# Patient Record
Sex: MALE | Race: WHITE | NOT HISPANIC OR LATINO | Employment: FULL TIME | URBAN - METROPOLITAN AREA
[De-identification: names, ages, dates, MRNs, and addresses within clinical notes are randomized per-mention and may not be internally consistent; named-entity substitution may affect disease eponyms.]

---

## 2018-05-09 ENCOUNTER — HOSPITAL ENCOUNTER (OUTPATIENT)
Dept: RADIOLOGY | Facility: HOSPITAL | Age: 58
Discharge: HOME | End: 2018-05-09
Payer: COMMERCIAL

## 2018-05-09 ENCOUNTER — TRANSCRIBE ORDERS (OUTPATIENT)
Dept: ADMINISTRATIVE | Facility: HOSPITAL | Age: 58
End: 2018-05-09

## 2018-05-09 DIAGNOSIS — G89.29 CHRONIC LOW BACK PAIN WITHOUT SCIATICA, UNSPECIFIED BACK PAIN LATERALITY: ICD-10-CM

## 2018-05-09 DIAGNOSIS — G89.29 CHRONIC LOW BACK PAIN WITHOUT SCIATICA, UNSPECIFIED BACK PAIN LATERALITY: Primary | ICD-10-CM

## 2018-05-09 DIAGNOSIS — M54.50 CHRONIC LOW BACK PAIN WITHOUT SCIATICA, UNSPECIFIED BACK PAIN LATERALITY: ICD-10-CM

## 2018-05-09 DIAGNOSIS — M54.50 CHRONIC LOW BACK PAIN WITHOUT SCIATICA, UNSPECIFIED BACK PAIN LATERALITY: Primary | ICD-10-CM

## 2018-05-09 PROCEDURE — 72110 X-RAY EXAM L-2 SPINE 4/>VWS: CPT

## 2021-01-21 ENCOUNTER — EVALUATION (OUTPATIENT)
Dept: OCCUPATIONAL THERAPY | Facility: CLINIC | Age: 61
End: 2021-01-21
Payer: COMMERCIAL

## 2021-01-21 DIAGNOSIS — M65.322 TRIGGER FINGER, LEFT INDEX FINGER: Primary | ICD-10-CM

## 2021-01-21 PROCEDURE — 97165 OT EVAL LOW COMPLEX 30 MIN: CPT | Performed by: OCCUPATIONAL THERAPIST

## 2021-01-21 PROCEDURE — 97110 THERAPEUTIC EXERCISES: CPT | Performed by: OCCUPATIONAL THERAPIST

## 2021-01-21 NOTE — PROGRESS NOTES
OT Evaluation     Today's date: 2021  Patient name: Dana Quijano  : 1960  MRN: [id number]  Referring provider: Filiberto Arellano MD  Dx:   Encounter Diagnosis     ICD-10-CM    1  Trigger finger, left index finger  M65 322        Start Time: 1400  Stop Time: 1445  Total time in clinic (min): 45 minutes    Assessment  Assessment details: 63 y/o right handed male who presents with chronic mild to moderate edema of the left index finger post left II TFR  Pt  Has been unable to make a composite fist and has had to restrict left use to due to pain and edema  Pt  Is 5 weeks post DOS and presents with limited AROM, pain, edema, and weakness  Impairments: impaired physical strength, lacks appropriate home exercise program and pain with function  Other impairment: Pt  holds index in extension during function to avoid use due to pain   Functional limitations: UA to  with force, open jars, do resisted exercises, Pain with gripping and pulling/pushing  Symptom irritability: lowBarriers to therapy: none  Understanding of Dx/Px/POC: good  Goals  STG   1  Increase PIP flexion to more than 90 degrees  2  Reduce edema by at least  3 cm  3  Pain with flexion less than 3/10    LTG  1  Composite flexion to WNL  2  Resolve edema to a minimal level  3   of left to at least 50% of right  4  Return to golfing  Plan  Plan details: Pt  Will be attending therapy once weekly  He has been issued a HEP for edema control, TGE, night positioning, and pain control  HEP will be progressed as goals are achieved     Patient would benefit from: skilled occupational therapy and OT eval  Planned modality interventions: thermotherapy: hydrocollator packs  Planned therapy interventions: stretching, strengthening, therapeutic activities, therapeutic exercise, graded exercise, graded activity, functional ROM exercises, patient education, home exercise program, joint mobilization and manual therapy  Frequency: 1x week  Duration in visits: 4  Duration in weeks: 4  Plan of Care beginning date: 2021  Plan of Care expiration date: 2021  Treatment plan discussed with: patient        Subjective Evaluation    History of Present Illness  Date of surgery: 12/15/2020  Mechanism of injury: surgery  Mechanism of injury: Chronic trigger finger, L II;   TFR of L II  12/15/2020  Quality of life: good    Pain  Current pain ratin  At best pain rating: 3  At worst pain rating: 3  Location: Left index  Quality: pressure, throbbing and dull ache  Relieving factors: heat, ice, rest and change in position  Progression: no change    Hand dominance: right  Life stress: UA to golf    Treatments  Previous treatment: chiropractic  Current treatment: home therapy, injection treatment and occupational therapy  Patient Goals  Patient goals for therapy: decreased edema, decreased pain, increased motion, increased strength, return to sport/leisure activities and independence with ADLs/IADLs          Objective     Observations     Left Wrist/Hand   Positive for edema  Additional Observation Details  Mild chronic edema of the left index with reduced dorsal knuckle pads  Palpation     Additional Palpation Details  Tenderness surrounding surgical site  Mildly edematous  Active Range of Motion     Left Digits    Flexion   Index     MCP: 85    PIP: 72    DIP: 40  Extension   Index     MCP: 0    PIP: -20    DIP: 0    Additional Active Range of Motion Details  Composite flexion of II to DPC= 3 0 cm; Index PIP rests in slight flexion due to joint edema  Strength/Myotome Testing     Additional Strength Details  Strength NT due to painful flexion of left II; Will be recorded at a later date       Swelling     Left Wrist/Hand   Index     Middle: 7 1 cm    Right Wrist/Hand   Index     Middle: 6 6 cm             Precautions:  universal      Manuals                                                                 Neuro Re-Ed Ther Ex             HEP edema, TGE, pain                                                                                                       Ther Activity                                       Gait Training                                       Modalities

## 2021-01-21 NOTE — LETTER
2021    Aric Lagunas MD  [address]  [address] [address]    Patient: Walter Navarrete   YOB: 1960   Date of Visit: 2021     Encounter Diagnosis     ICD-10-CM    1  Trigger finger, left index finger  M65 322        Dear Dr Moody Reid Recipients: Thank you for your recent referral of Walter Navarrete  Please review the attached evaluation summary from Peter's recent visit  Please verify that you agree with the plan of care by signing the attached order  If you have any questions or concerns, please do not hesitate to call  I sincerely appreciate the opportunity to share in the care of one of your patients and hope to have another opportunity to work with you in the near future  Sincerely,    Nikunj Jorgensen, OT      Referring Provider:     I certify that I have read the below Plan of Care and certify the need for these services furnished under this plan of treatment while under my care  Aric Lagunas MD  [place] [address]  Via Fax: [phone]        OT Evaluation     Today's date: 2021  Patient name: Walter Navarrete  : 1960  MRN: [id number]  Referring provider: Jinny Oro MD  Dx:   Encounter Diagnosis     ICD-10-CM    1  Trigger finger, left index finger  M65 322        Start Time: 1400  Stop Time: 1445  Total time in clinic (min): 45 minutes    Assessment  Assessment details: 65 y/o right handed male who presents with chronic mild to moderate edema of the left index finger post left II TFR  Pt  Has been unable to make a composite fist and has had to restrict left use to due to pain and edema  Pt  Is 5 weeks post DOS and presents with limited AROM, pain, edema, and weakness  Impairments: impaired physical strength, lacks appropriate home exercise program and pain with function  Other impairment: Pt  holds index in extension during function to avoid use due to pain   Functional limitations: UA to  with force, open jars, do resisted exercises, Pain with gripping and pulling/pushing  Symptom irritability: lowBarriers to therapy: none  Understanding of Dx/Px/POC: good  Goals  STG   1  Increase PIP flexion to more than 90 degrees  2  Reduce edema by at least  3 cm  3  Pain with flexion less than 3/10    LTG  1  Composite flexion to WNL  2  Resolve edema to a minimal level  3   of left to at least 50% of right  4  Return to golfing  Plan  Plan details: Pt  Will be attending therapy once weekly  He has been issued a HEP for edema control, TGE, night positioning, and pain control  HEP will be progressed as goals are achieved     Patient would benefit from: skilled occupational therapy and OT eval  Planned modality interventions: thermotherapy: hydrocollator packs  Planned therapy interventions: stretching, strengthening, therapeutic activities, therapeutic exercise, graded exercise, graded activity, functional ROM exercises, patient education, home exercise program, joint mobilization and manual therapy  Frequency: 1x week  Duration in visits: 4  Duration in weeks: 4  Plan of Care beginning date: 2021  Plan of Care expiration date: 2021  Treatment plan discussed with: patient        Subjective Evaluation    History of Present Illness  Date of surgery: 12/15/2020  Mechanism of injury: surgery  Mechanism of injury: Chronic trigger finger, L II;   TFR of L II  12/15/2020  Quality of life: good    Pain  Current pain ratin  At best pain rating: 3  At worst pain rating: 3  Location: Left index  Quality: pressure, throbbing and dull ache  Relieving factors: heat, ice, rest and change in position  Progression: no change    Hand dominance: right  Life stress: UA to golf    Treatments  Previous treatment: chiropractic  Current treatment: home therapy, injection treatment and occupational therapy  Patient Goals  Patient goals for therapy: decreased edema, decreased pain, increased motion, increased strength, return to sport/leisure activities and independence with ADLs/IADLs          Objective     Observations     Left Wrist/Hand   Positive for edema  Additional Observation Details  Mild chronic edema of the left index with reduced dorsal knuckle pads  Palpation     Additional Palpation Details  Tenderness surrounding surgical site  Mildly edematous  Active Range of Motion     Left Digits    Flexion   Index     MCP: 85    PIP: 72    DIP: 40  Extension   Index     MCP: 0    PIP: -20    DIP: 0    Additional Active Range of Motion Details  Composite flexion of II to DPC= 3 0 cm; Index PIP rests in slight flexion due to joint edema  Strength/Myotome Testing     Additional Strength Details  Strength NT due to painful flexion of left II; Will be recorded at a later date       Swelling     Left Wrist/Hand   Index     Middle: 7 1 cm    Right Wrist/Hand   Index     Middle: 6 6 cm             Precautions:  universal      Manuals 1/21                                                                Neuro Re-Ed                                                                                                        Ther Ex             HEP edema, TGE, pain                                                                                                       Ther Activity                                       Gait Training                                       Modalities

## 2021-01-22 ENCOUNTER — TRANSCRIBE ORDERS (OUTPATIENT)
Dept: PHYSICAL THERAPY | Facility: CLINIC | Age: 61
End: 2021-01-22

## 2021-01-22 DIAGNOSIS — M65.322 TRIGGER FINGER, LEFT INDEX FINGER: Primary | ICD-10-CM

## 2021-01-28 ENCOUNTER — OFFICE VISIT (OUTPATIENT)
Dept: OCCUPATIONAL THERAPY | Facility: CLINIC | Age: 61
End: 2021-01-28
Payer: COMMERCIAL

## 2021-01-28 DIAGNOSIS — M65.322 TRIGGER FINGER, LEFT INDEX FINGER: Primary | ICD-10-CM

## 2021-01-28 PROCEDURE — 97140 MANUAL THERAPY 1/> REGIONS: CPT | Performed by: OCCUPATIONAL THERAPIST

## 2021-01-28 PROCEDURE — 97110 THERAPEUTIC EXERCISES: CPT | Performed by: OCCUPATIONAL THERAPIST

## 2021-01-28 NOTE — PROGRESS NOTES
Daily Note     Today's date: 2021  Patient name: Derik Zimmer  : 1960  MRN: [id number]  Referring provider: Debbi Ramos MD  Dx: No diagnosis found  Subjective: " I made the most progress in the last week"      Objective: See treatment diary below ;  Pt  Issued new HEP for hook fisting, scar care to volar MP scar tissue, and continue edema control  Hook fist to Volar MP  II  2 cm,  III, IV, V  1 cm    Assessment: Tolerated treatment well  Pain minimal with hook fisting, but increases to sharp pain with composite fisting  edema or digit reducing, localized volar scar edema persists  Patient would benefit from continued OT      Plan: Progress treatment as tolerated  Assess hook fisting and composite fisting pain level and edema reduction        Precautions:  universal      Manuals            Jt  mobs  5'           IASTM  5'                                     Neuro Re-Ed                                                                                                        Ther Ex    15'           HEP edema, TGE, pain New HEP= Hook,  Edema palm, pain                                                                                                       Ther Activity                                       Gait Training                                       Modalities   5' pre tx

## 2021-02-04 ENCOUNTER — OFFICE VISIT (OUTPATIENT)
Dept: OCCUPATIONAL THERAPY | Facility: CLINIC | Age: 61
End: 2021-02-04
Payer: COMMERCIAL

## 2021-02-04 DIAGNOSIS — M65.322 TRIGGER FINGER, LEFT INDEX FINGER: Primary | ICD-10-CM

## 2021-02-04 PROCEDURE — 97140 MANUAL THERAPY 1/> REGIONS: CPT | Performed by: OCCUPATIONAL THERAPIST

## 2021-02-04 PROCEDURE — 97110 THERAPEUTIC EXERCISES: CPT | Performed by: OCCUPATIONAL THERAPIST

## 2021-02-04 NOTE — PROGRESS NOTES
Daily Note     Today's date: 2021  Patient name: Chip Zapata  : 1960  MRN: [id number]  Referring provider: Leslie Wan MD  Dx: No diagnosis found  Subjective:  "My finger is still sore"  Sharp pain over scar with full composite flexion  Objective: See treatment diary below;  Edema glove with elastomer issued for HEP HOS  Assessment: Tolerated treatment well  Patient would benefit from continued OT      Plan: Progress treatment as tolerated         Precautions:  universal      Manuals           Jt  mobs  5' 5'          IASTM  5'           Edema RG   5'                       Neuro Re-Ed                                                                                                        Ther Ex    15'   20'          HEP edema, TGE, pain New HEP= Hook,  Edema palm, pain                                                                                                       Ther Activity                                       Gait Training                                       Modalities   5' pre tx

## 2021-02-11 ENCOUNTER — OFFICE VISIT (OUTPATIENT)
Dept: OCCUPATIONAL THERAPY | Facility: CLINIC | Age: 61
End: 2021-02-11
Payer: COMMERCIAL

## 2021-02-11 DIAGNOSIS — M65.322 TRIGGER FINGER, LEFT INDEX FINGER: Primary | ICD-10-CM

## 2021-02-11 PROCEDURE — 97110 THERAPEUTIC EXERCISES: CPT | Performed by: OCCUPATIONAL THERAPIST

## 2021-02-11 PROCEDURE — 97140 MANUAL THERAPY 1/> REGIONS: CPT | Performed by: OCCUPATIONAL THERAPIST

## 2021-02-11 NOTE — PROGRESS NOTES
Daily Note     Today's date: 2021  Patient name: Evelia Portillo  : 1960  MRN: [id number]  Referring provider: Lino Quiroz MD  Dx: No diagnosis found  Subjective:  "My finger is still so sore"  Sharp pain over scar with full composite flexion  "Still so swollen"      Objective: See treatment diary below;  Edema glove with elastomer issued for HEP HOS  AROM    MP  0/85,    PIP  -7/90  DIP  0/65  DPC=  1 5 cm    Edema II PIP  L  6 5 cm,  R  6 8 cm      Assessment: Tolerated treatment well  Patient would benefit from continued OT  Pain reduced, edema reduced, AROM improved significantly since IE        Plan: Progress treatment as tolerated  Pt to trial  OTC anti inflammatory medications over the next week  Precautions:  universal      Manuals          Jt  mobs  5' 5' 5'         IASTM  5'           Edema RG   5' 5'                      Neuro Re-Ed                                                                                                        Ther Ex    15'   20'   15'         HEP edema, TGE, pain New HEP= Hook,  Edema palm, pain   Review  measure; pain  Add meds            TGE       2x10                                                                                       Ther Activity                                       Gait Training                                       Modalities   5' pre tx   5'   5'

## 2021-02-18 ENCOUNTER — APPOINTMENT (OUTPATIENT)
Dept: OCCUPATIONAL THERAPY | Facility: CLINIC | Age: 61
End: 2021-02-18
Payer: COMMERCIAL

## 2021-02-25 ENCOUNTER — OFFICE VISIT (OUTPATIENT)
Dept: OCCUPATIONAL THERAPY | Facility: CLINIC | Age: 61
End: 2021-02-25
Payer: COMMERCIAL

## 2021-02-25 DIAGNOSIS — M65.322 TRIGGER FINGER, LEFT INDEX FINGER: Primary | ICD-10-CM

## 2021-02-25 PROCEDURE — 97140 MANUAL THERAPY 1/> REGIONS: CPT | Performed by: OCCUPATIONAL THERAPIST

## 2021-02-25 PROCEDURE — 97110 THERAPEUTIC EXERCISES: CPT | Performed by: OCCUPATIONAL THERAPIST

## 2021-02-25 NOTE — LETTER
2021    MD Anna Raphael [address] [facility]    Patient: Fina Jay   YOB: 1960   Date of Visit: 2021     Encounter Diagnosis     ICD-10-CM    1  Trigger finger, left index finger  M65 322        Dear Dr Koko Meza: Thank you for your recent referral of Fina Jay  Please review the attached evaluation summary from Peter's recent visit  Please verify that you agree with the plan of care by signing the attached order  If you have any questions or concerns, please do not hesitate to call  I sincerely appreciate the opportunity to share in the care of one of your patients and hope to have another opportunity to work with you in the near future  Sincerely,    Bell Ovalle, OT      Referring Provider:     I certify that I have read the below Plan of Care and certify the need for these services furnished under this plan of treatment while under my care  MD Anna Raphael [address] [place] [address]  Via Fax: [phone]        OT Re-Evaluation     Today's date: 2021  Patient name: Fina Jay  : 1960  MRN: [id number]  Referring provider: Yazmin Gomez MD  Dx:   No diagnosis found  Assessment  Assessment details: Jerrell Rosario has attended 5 therapy appts since his IE on 2021  Therapy has been focused on reducing edema and inflammation, improve painfree AROM, and return to premorbid leisure activities and workouts  Improvements measured in motion, strength, reduction of edema and pain  FOTO increased from score of 55 at IE to 65 today, with goal of 68  Deficits remain in final flexion ability of index, increased pain with tight flexion of the index to palm, and mild edema        Impairments: impaired physical strength and pain with function  Functional limitations: Limited ability to  with force, open jars  Symptom irritability: lowBarriers to therapy: none  Understanding of Dx/Px/POC: good  Goals  STG   1  Increase PIP flexion to more than 90 degrees MET  2   Reduce edema by at least  3 cm MEt  3   Pain with flexion less than 3/10 MET    LTG  1  Composite flexion to WNL  2  Resolve edema to a minimal level MET  3    of left to at least 50% of right MET  4  Return to golfing  Plan  Plan details: Continuation of therapy once weekly to address deficits and achieve all LTGs  HEP will be progressed as goals are achieved  Patient would benefit from: skilled occupational therapy and OT eval  Planned modality interventions: thermotherapy: hydrocollator packs  Planned therapy interventions: stretching, strengthening, therapeutic activities, therapeutic exercise, graded exercise, graded activity, functional ROM exercises, patient education, home exercise program, joint mobilization and manual therapy  Frequency: 1x week  Duration in visits: 4  Duration in weeks: 4  Plan of Care beginning date: 2021  Plan of Care expiration date: 3/25/2021  Treatment plan discussed with: patient        Subjective Evaluation    History of Present Illness  Date of surgery: 12/15/2020  Mechanism of injury: surgery  Mechanism of injury: Chronic trigger finger, L II;   TFR of L II  12/15/2020          Recurrent probem    Quality of life: good    Pain  Current pain ratin  At best pain rating: 3  At worst pain rating: 3  Location: Left index  Quality: pressure and dull ache  Relieving factors: heat and rest  Progression: improved    Hand dominance: right  Life stress: UA to golf    Treatments  Previous treatment: chiropractic  Current treatment: occupational therapy  Patient Goals  Patient goals for therapy: decreased edema, decreased pain, increased motion, increased strength, return to sport/leisure activities and independence with ADLs/IADLs  Patient goal: To be able to make a fist and not have pain           Objective Observations     Left Wrist/Hand   Positive for edema  Additional Observation Details  Mild chronic edema of the left index with reduced dorsal knuckle pads  Edema at surgical site resolved  Color and temperature of hands equal      Active Range of Motion     Left Digits    Flexion   Index     MCP: 90    PIP: 90    DIP: 62  Extension   Index     MCP: 0    PIP: -5    DIP: 0    Additional Active Range of Motion Details  II Composite flexion to DPC= 1 cm    Strength/Myotome Testing     Additional Strength Details  Broderick #2  L  88 lbs  (No pain) ,  R  93 lbs  Swelling     Left Wrist/Hand   Index     Middle: 7 1 cm    Right Wrist/Hand   Index     Middle: 6 8 cm             Precautions:  universal      Manuals 1/21 1/28 2/4 2/11 2/25        Jt  mobs  5' 5' 5' 5'        IASTM  5'   5'        Edema RG   5' 5' 5'                     Neuro Re-Ed                                                                                                        Ther Ex    15'   20'   15'   15'        HEP edema, TGE, pain New HEP= Hook,  Edema palm, pain   Review  measure; pain  Add meds    RE-  updated HEP        TGE       2x10 2x10                                                                                      Ther Activity                                       Gait Training                                       Modalities   5' pre tx   5'   5'   5'

## 2021-02-25 NOTE — PROGRESS NOTES
OT Re-Evaluation     Today's date: 2021  Patient name: Darvin Shay  : 1960  MRN: [id number]  Referring provider: Nancy Willingham MD  Dx:   No diagnosis found  Assessment  Assessment details: Tracy Mckeon has attended 5 therapy appts since his IE on 2021  Therapy has been focused on reducing edema and inflammation, improve painfree AROM, and return to premorbid leisure activities and workouts  Improvements measured in motion, strength, reduction of edema and pain  FOTO increased from score of 55 at IE to 65 today, with goal of 68  Deficits remain in final flexion ability of index, increased pain with tight flexion of the index to palm, and mild edema  Impairments: impaired physical strength and pain with function  Functional limitations: Limited ability to  with force, open jars  Symptom irritability: lowBarriers to therapy: none  Understanding of Dx/Px/POC: good  Goals  STG   1  Increase PIP flexion to more than 90 degrees MET  2   Reduce edema by at least  3 cm MEt  3   Pain with flexion less than 3/10 MET    LTG  1  Composite flexion to WNL  2  Resolve edema to a minimal level MET  3    of left to at least 50% of right MET  4  Return to golfing  Plan  Plan details: Continuation of therapy once weekly to address deficits and achieve all LTGs  HEP will be progressed as goals are achieved     Patient would benefit from: skilled occupational therapy and OT eval  Planned modality interventions: thermotherapy: hydrocollator packs  Planned therapy interventions: stretching, strengthening, therapeutic activities, therapeutic exercise, graded exercise, graded activity, functional ROM exercises, patient education, home exercise program, joint mobilization and manual therapy  Frequency: 1x week  Duration in visits: 4  Duration in weeks: 4  Plan of Care beginning date: 2021  Plan of Care expiration date: 3/25/2021  Treatment plan discussed with: patient        Subjective Evaluation    History of Present Illness  Date of surgery: 12/15/2020  Mechanism of injury: surgery  Mechanism of injury: Chronic trigger finger, L II;   TFR of L II  12/15/2020          Recurrent probem    Quality of life: good    Pain  Current pain ratin  At best pain rating: 3  At worst pain rating: 3  Location: Left index  Quality: pressure and dull ache  Relieving factors: heat and rest  Progression: improved    Hand dominance: right  Life stress: UA to golf    Treatments  Previous treatment: chiropractic  Current treatment: occupational therapy  Patient Goals  Patient goals for therapy: decreased edema, decreased pain, increased motion, increased strength, return to sport/leisure activities and independence with ADLs/IADLs  Patient goal: To be able to make a fist and not have pain  Objective     Observations     Left Wrist/Hand   Positive for edema  Additional Observation Details  Mild chronic edema of the left index with reduced dorsal knuckle pads  Edema at surgical site resolved  Color and temperature of hands equal      Active Range of Motion     Left Digits    Flexion   Index     MCP: 90    PIP: 90    DIP: 62  Extension   Index     MCP: 0    PIP: -5    DIP: 0    Additional Active Range of Motion Details  II Composite flexion to DPC= 1 cm    Strength/Myotome Testing     Additional Strength Details  Broderick #2  L  88 lbs  (No pain) ,  R  93 lbs  Swelling     Left Wrist/Hand   Index     Middle: 7 1 cm    Right Wrist/Hand   Index     Middle: 6 8 cm             Precautions:  universal      Manuals         Jt  mobs  5' 5' 5' 5'        IASTM  5'   5'        Edema RG   5' 5' 5'                     Neuro Re-Ed                                                                                                        Ther Ex    15'   20'   15'   15'        HEP edema, TGE, pain New HEP= Hook,  Edema palm, pain   Review  measure; pain  Add meds  RE-  updated HEP        TGE       2x10 2x10                                                                                      Ther Activity                                       Gait Training                                       Modalities   5' pre tx   5'   5'   5'

## 2021-02-26 ENCOUNTER — TRANSCRIBE ORDERS (OUTPATIENT)
Dept: PHYSICAL THERAPY | Facility: CLINIC | Age: 61
End: 2021-02-26

## 2021-02-26 DIAGNOSIS — M65.322 TRIGGER FINGER, LEFT INDEX FINGER: Primary | ICD-10-CM

## 2021-03-04 ENCOUNTER — OFFICE VISIT (OUTPATIENT)
Dept: OCCUPATIONAL THERAPY | Facility: CLINIC | Age: 61
End: 2021-03-04
Payer: COMMERCIAL

## 2021-03-04 DIAGNOSIS — M65.322 TRIGGER FINGER, LEFT INDEX FINGER: Primary | ICD-10-CM

## 2021-03-04 PROCEDURE — 97140 MANUAL THERAPY 1/> REGIONS: CPT | Performed by: OCCUPATIONAL THERAPIST

## 2021-03-04 PROCEDURE — 97110 THERAPEUTIC EXERCISES: CPT | Performed by: OCCUPATIONAL THERAPIST

## 2021-03-04 NOTE — LETTER
2021    Kae Arboleda MD  [address]  [facility] [address] [facility] [address]    Patient: Neal Riggs   YOB: 1960   Date of Visit: 3/4/2021     Encounter Diagnosis     ICD-10-CM    1  Trigger finger, left index finger  M65 322        Dear Dr Angel Gary: Thank you for your recent referral of Neal Riggs  Please review the attached evaluation summary from Peter's recent visit  Please verify that you agree with the plan of care by signing the attached order  If you have any questions or concerns, please do not hesitate to call  I sincerely appreciate the opportunity to share in the care of one of your patients and hope to have another opportunity to work with you in the near future  Sincerely,    Karuna Colbert, OT      Referring Provider:     I certify that I have read the below Plan of Care and certify the need for these services furnished under this plan of treatment while under my care  Kae Arboleda MD  [place] [address]  Via Fax: [phone]        OT Re-Evaluation     Today's date: 3/4/2021  Patient name: Neal Riggs  : 1960  MRN: [id number]  Referring provider: Curt Amso MD  Dx:   No diagnosis found  Assessment  Assessment details: Fransisco Bennett has attended 6 therapy appts since his IE on 2021  Therapy has been focused on reducing edema and inflammation, improve painfree AROM, and return to premorbid leisure activities and workouts  Improvements measured in motion, strength, reduction of edema and pain  FOTO increased from score of 55 at IE to 65 today, with goal of 68  Deficits remain in final flexion ability of index, increased pain with tight flexion of the index to palm, and mild edema  *Palpable nodule at A1 which catches in flexion and extension of index       Impairments: impaired physical strength and pain with function  Functional limitations: Limited ability to  with force, open jars  Symptom irritability: lowBarriers to therapy: none  Understanding of Dx/Px/POC: good  Goals  STG   1  Increase PIP flexion to more than 90 degrees MET  2   Reduce edema by at least  3 cm MEt  3   Pain with flexion less than 3/10 MET    LTG  1  Composite flexion to WNL  2  Resolve edema to a minimal level MET  3    of left to at least 50% of right MET  4  Return to golfing  Plan  Plan details: Hold Therapy for return to physician regarding pain over the A1 pulley with palpable nodule  *Please advise     Patient would benefit from: skilled occupational therapy and OT eval  Planned modality interventions: thermotherapy: hydrocollator packs  Planned therapy interventions: stretching, strengthening, therapeutic activities, therapeutic exercise, graded exercise, graded activity, functional ROM exercises, patient education, home exercise program, joint mobilization and manual therapy  Frequency: 1x week  Duration in visits: 4  Duration in weeks: 4  Plan of Care beginning date: 3/4/2021  Plan of Care expiration date: 2021  Treatment plan discussed with: patient        Subjective Evaluation    History of Present Illness  Date of surgery: 12/15/2020  Mechanism of injury: surgery  Mechanism of injury: Chronic trigger finger, L II;   TFR of L II  12/15/2020          Recurrent probem    Quality of life: good    Pain  Current pain ratin  At best pain rating: 3  At worst pain rating: 3  Location: Left index  Quality: pressure and dull ache  Relieving factors: heat and rest  Progression: improved    Hand dominance: right  Life stress: UA to golf    Treatments  Previous treatment: chiropractic  Current treatment: occupational therapy  Patient Goals  Patient goals for therapy: decreased edema, decreased pain, increased motion, increased strength, return to sport/leisure activities and independence with ADLs/IADLs  Patient goal: To be able to make a fist and not have pain  Objective     Observations     Left Wrist/Hand   Positive for edema  Additional Observation Details  Mild chronic edema of the left index with reduced dorsal knuckle pads  Edema at surgical site resolved  Color and temperature of hands equal      Active Range of Motion     Left Digits    Flexion   Index     MCP: 90    PIP: 90    DIP: 62  Extension   Index     MCP: 0    PIP: -5    DIP: 0    Additional Active Range of Motion Details  II Composite flexion to DPC= 1 cm    Strength/Myotome Testing     Additional Strength Details  Broderick #2  L  88 lbs  (No pain) ,  R  93 lbs  Swelling     Left Wrist/Hand   Index     Middle: 7 1 cm    Right Wrist/Hand   Index     Middle: 6 8 cm             Precautions:  universal      Manuals 1/21 1/28 2/4 2/11 2/25 3/4       Jt  mobs  5' 5' 5' 5' 5'       IASTM  5'   5' 5'       Edema RG   5' 5' 5' 5'                    Neuro Re-Ed                                                                                                        Ther Ex    15'   20'   15'   15'   15'       HEP edema, TGE, pain New HEP= Hook,  Edema palm, pain   Review  measure; pain  Add meds    RE-  updated HEP Revise HEP-  Pain symptom reduce       TGE       2x10 2x10 2x10                                                                                     Ther Activity                                       Gait Training                                       Modalities   5' pre tx   5'   5'   5'

## 2021-03-04 NOTE — PROGRESS NOTES
OT Re-Evaluation  and OT Discharge     Today's date: 3/4/2021  Patient name: Isauro Gordon  : 1960  MRN: [id number]  Referring provider: Dorice Brunner, MD  Dx:   No diagnosis found  Assessment  Assessment details: 3/19/21  Therapy discharged  Pt  Referred back to physician for further assessment  Stefani Mejia has attended 6 therapy appts since his IE on 2021  Therapy has been focused on reducing edema and inflammation, improve painfree AROM, and return to premorbid leisure activities and workouts  Improvements measured in motion, strength, reduction of edema and pain  FOTO increased from score of 55 at IE to 65 today, with goal of 68  Deficits remain in final flexion ability of index, increased pain with tight flexion of the index to palm, and mild edema  *Palpable nodule at A1 which catches in flexion and extension of index  Impairments: impaired physical strength and pain with function  Functional limitations: Limited ability to  with force, open jars  Symptom irritability: lowBarriers to therapy: none  Understanding of Dx/Px/POC: good  Goals  STG   1  Increase PIP flexion to more than 90 degrees MET  2   Reduce edema by at least  3 cm MEt  3   Pain with flexion less than 3/10 MET    LTG  1  Composite flexion to WNL  2  Resolve edema to a minimal level MET  3    of left to at least 50% of right MET  4  Return to golfing  Plan  Plan details: Hold Therapy for return to physician regarding pain over the A1 pulley with palpable nodule  *Please advise  Discharge for further assessment      Planned therapy interventions: stretching, therapeutic activities, therapeutic exercise, graded exercise, graded activity, functional ROM exercises, home exercise program, patient education, strengthening, joint mobilization and manual therapy  Frequency: 1x week  Duration in visits: 4  Duration in weeks: 4  Plan of Care beginning date: 3/4/2021  Plan of Care expiration date: 2021  Treatment plan discussed with: patient        Subjective Evaluation    History of Present Illness  Date of surgery: 12/15/2020  Mechanism of injury: surgery  Mechanism of injury: Chronic trigger finger, L II;   TFR of L II  12/15/2020          Recurrent probem    Quality of life: good    Pain  Current pain ratin  At best pain rating: 3  At worst pain rating: 3  Location: Left index  Quality: pressure and dull ache  Relieving factors: heat and rest  Progression: improved    Hand dominance: right  Life stress: UA to golf    Treatments  Previous treatment: chiropractic  Current treatment: occupational therapy  Patient Goals  Patient goals for therapy: decreased edema, decreased pain, increased motion, increased strength, return to sport/leisure activities and independence with ADLs/IADLs  Patient goal: To be able to make a fist and not have pain  Objective     Observations     Left Wrist/Hand   Positive for edema  Additional Observation Details  Mild chronic edema of the left index with reduced dorsal knuckle pads  Edema at surgical site resolved  Color and temperature of hands equal      Active Range of Motion     Left Digits    Flexion   Index     MCP: 90    PIP: 90    DIP: 62  Extension   Index     MCP: 0    PIP: -5    DIP: 0    Additional Active Range of Motion Details  II Composite flexion to DPC= 1 cm    Strength/Myotome Testing     Additional Strength Details  Broderick #2  L  88 lbs  (No pain) ,  R  93 lbs       Swelling     Left Wrist/Hand   Index     Middle: 7 1 cm    Right Wrist/Hand   Index     Middle: 6 8 cm             Precautions:  universal      Manuals  2/4 2/11 2/25 3/4       Jt  mobs  5' 5' 5' 5' 5'       IASTM  5'   5' 5'       Edema RG   5' 5' 5' 5'                    Neuro Re-Ed                                                                                                        Ther Ex    15'   20'   15'   15'   15'       HEP edema, TGE, pain New HEP= Hook,  Edema palm, pain   Review  measure; pain  Add meds    RE-  updated HEP Revise HEP-  Pain symptom reduce       TGE       2x10 2x10 2x10                                                                                     Ther Activity                                       Gait Training                                       Modalities   5' pre tx   5'   5'   5'

## 2021-03-05 ENCOUNTER — TRANSCRIBE ORDERS (OUTPATIENT)
Dept: PHYSICAL THERAPY | Facility: CLINIC | Age: 61
End: 2021-03-05

## 2021-03-05 DIAGNOSIS — M65.322 TRIGGER FINGER, LEFT INDEX FINGER: Primary | ICD-10-CM

## 2021-03-11 ENCOUNTER — APPOINTMENT (OUTPATIENT)
Dept: OCCUPATIONAL THERAPY | Facility: CLINIC | Age: 61
End: 2021-03-11
Payer: COMMERCIAL

## 2021-03-18 ENCOUNTER — APPOINTMENT (OUTPATIENT)
Dept: OCCUPATIONAL THERAPY | Facility: CLINIC | Age: 61
End: 2021-03-18
Payer: COMMERCIAL

## 2021-03-26 DIAGNOSIS — Z23 ENCOUNTER FOR IMMUNIZATION: ICD-10-CM

## 2021-08-06 ENCOUNTER — APPOINTMENT (OUTPATIENT)
Dept: OCCUPATIONAL THERAPY | Facility: CLINIC | Age: 61
End: 2021-08-06
Payer: COMMERCIAL

## 2021-08-09 ENCOUNTER — APPOINTMENT (OUTPATIENT)
Dept: OCCUPATIONAL THERAPY | Facility: CLINIC | Age: 61
End: 2021-08-09
Payer: COMMERCIAL

## 2021-08-09 ENCOUNTER — EVALUATION (OUTPATIENT)
Dept: OCCUPATIONAL THERAPY | Facility: CLINIC | Age: 61
End: 2021-08-09
Payer: COMMERCIAL

## 2021-08-09 DIAGNOSIS — M65.311 TRIGGER FINGER OF RIGHT THUMB: Primary | ICD-10-CM

## 2021-08-09 PROCEDURE — 97165 OT EVAL LOW COMPLEX 30 MIN: CPT | Performed by: OCCUPATIONAL THERAPIST

## 2021-08-09 PROCEDURE — 97110 THERAPEUTIC EXERCISES: CPT | Performed by: OCCUPATIONAL THERAPIST

## 2021-08-09 NOTE — LETTER
2021    Cesar Mata MD  [address]  [facility] [address] [facility] [address] [facility]    Patient: Keli Anguiano   YOB: 1960   Date of Visit: 2021     Encounter Diagnosis     ICD-10-CM    1  Trigger finger of right thumb  M65 311        Dear Dr Emanuel Abt: Thank you for your recent referral of Keli Anguiano  Please review the attached evaluation summary from Peter's recent visit  Please verify that you agree with the plan of care by signing the attached order  If you have any questions or concerns, please do not hesitate to call  I sincerely appreciate the opportunity to share in the care of one of your patients and hope to have another opportunity to work with you in the near future  Sincerely,    Gema Hatfield, OT      Referring Provider:     I certify that I have read the below Plan of Care and certify the need for these services furnished under this plan of treatment while under my care  Cesar Mata MD  [place] [id number]  Via Fax: [phone]        OT Evaluation     Today's date: 2021  Patient name: Keli Anguiano  : 1960  MRN: [id number]  Referring provider: Rosa Isela Pierce MD  Dx:   Encounter Diagnosis     ICD-10-CM    1  Trigger finger of right thumb  M65 311                   Assessment  Assessment details: 64 y/o right handed male who presents one week post DOS for TGR of right thumb  Surgical site well approximated without signs of infection  Slight macerated due to wear of bandaid  Examination reveals mild edema and pain, decreased motion,no sensory complaints  Strength not assessed due to early healing post DOS  Evaluation is of low complexity, due to minimal comorbidities and stable clinical presentation  Pt demonstrates good tolerance of therapy today and was provided with a written HEP focusing on edema control, post op tissue care,  And Gentle AROM exercises    Pt was instructed to perform HEP daily tid  The patient demonstrates HEP instructions appropriately, verbalizes understanding, and is in agreement with the written HEP  Pt will benefit from skilled OT intervention to progress motion, reduce pain and edema, increased strength, and  and allow return to PLOF  Patient will be able to return to golf and home care with minimal difficulty by discharge      Impairments: abnormal or restricted ROM, impaired physical strength, lacks appropriate home exercise program and pain with function  Functional limitations: UA to  with force, open jars, do resisted exercises, Pain with gripping and pulling/pushing  One week post DOS  Symptom irritability: lowBarriers to therapy: none  Understanding of Dx/Px/POC: excellent  Goals  STG   1  Increase thumb IP flexion by 10 degrees  2  Reduce edema by at least  2 cm  3  Pain with flexion less than 2/10 with self care    LTG  1  Composite opposition to WNL  2  Resolve edema to a minimal level  3  Pinch of right  to at least 50% of left  4  Return to golfing  Plan  Plan details: Pt  Will be attending therapy 1-2 times  weekly      Patient would benefit from: skilled occupational therapy and OT eval  Planned modality interventions: thermotherapy: hydrocollator packs  Planned therapy interventions: stretching, strengthening, therapeutic activities, therapeutic exercise, graded exercise, graded activity, functional ROM exercises, patient education, home exercise program, joint mobilization and manual therapy  Frequency: 2x week  Duration in weeks: 6  Plan of Care beginning date: 2021  Plan of Care expiration date: 2021  Treatment plan discussed with: patient        Subjective Evaluation    History of Present Illness  Mechanism of injury: surgery  Mechanism of injury: TFR of L II  12/15/2020  Right Thumb TGR  8/3/2021          Recurrent probem    Quality of life: good    Pain  Current pain ratin  At best pain rating: 1  At worst pain ratin  Location: Left index  Quality: pressure, dull ache and tight  Relieving factors: ice and rest  Progression: improved (Improved greatly since DOS)    Hand dominance: right  Life stress: UA to golf    Treatments  Previous treatment: injection treatment  Current treatment: occupational therapy  Patient Goals  Patient goals for therapy: decreased edema, decreased pain, increased motion, increased strength, return to sport/leisure activities and independence with ADLs/IADLs  Patient goal: Able to golf;  painfree use of hand during HOS  Objective     Observations     Right Wrist/Hand   Positive for edema and incision  Additional Observation Details  Minimal edema in thumb;  Surgical site is mildly macerated due to wear of bandaid  Well approximated  No drainage, no signs of infection  Neurological Testing     Sensation     Wrist/Hand     Right   Intact: light touch    Active Range of Motion     Left Thumb   Flexion     MP: 60 degrees    DIP: 85 degrees  Extension     MP: 0 degrees    DIP: 30 degrees    Right Thumb   Flexion     MP: 52    DIP: 68  Extension     MP: 0    DIP: 30    Strength/Myotome Testing     Additional Strength Details  Strength NT due early stage post op  Will be recorded at a later date       Swelling     Left Wrist/Hand   Thumb     Proximal: 6 8 cm    Right Wrist/Hand   Thumb     Proximal: 7 3 cm             Precautions:  universal      Date             Visit 1            Manuals                                                                 Neuro Re-Ed                                                                                                        Ther Ex 10'            HEP edema, TGE, pain                                                                                                       Ther Activity                                       Gait Training                                       Modalities

## 2021-08-09 NOTE — PROGRESS NOTES
OT Evaluation     Today's date: 2021  Patient name: Nikolas Boudreaux  : 1960  MRN: [id number]  Referring provider: Myrna Guillermo MD  Dx:   Encounter Diagnosis     ICD-10-CM    1  Trigger finger of right thumb  M65 311                   Assessment  Assessment details: 64 y/o right handed male who presents one week post DOS for TGR of right thumb  Surgical site well approximated without signs of infection  Slight macerated due to wear of bandaid  Examination reveals mild edema and pain, decreased motion,no sensory complaints  Strength not assessed due to early healing post DOS  Evaluation is of low complexity, due to minimal comorbidities and stable clinical presentation  Pt demonstrates good tolerance of therapy today and was provided with a written HEP focusing on edema control, post op tissue care,  And Gentle AROM exercises  Pt was instructed to perform HEP daily tid  The patient demonstrates HEP instructions appropriately, verbalizes understanding, and is in agreement with the written HEP  Pt will benefit from skilled OT intervention to progress motion, reduce pain and edema, increased strength, and  and allow return to PLOF  Patient will be able to return to golf and home care with minimal difficulty by discharge      Impairments: abnormal or restricted ROM, impaired physical strength, lacks appropriate home exercise program and pain with function  Functional limitations: UA to  with force, open jars, do resisted exercises, Pain with gripping and pulling/pushing  One week post DOS  Symptom irritability: lowBarriers to therapy: none  Understanding of Dx/Px/POC: excellent  Goals  STG   1  Increase thumb IP flexion by 10 degrees  2  Reduce edema by at least  2 cm  3  Pain with flexion less than 2/10 with self care    LTG  1  Composite opposition to WNL  2  Resolve edema to a minimal level  3  Pinch of right  to at least 50% of left  4  Return to golfing       Plan  Plan details: Pt  Will be attending therapy 1-2 times  weekly  Patient would benefit from: skilled occupational therapy and OT eval  Planned modality interventions: thermotherapy: hydrocollator packs  Planned therapy interventions: stretching, strengthening, therapeutic activities, therapeutic exercise, graded exercise, graded activity, functional ROM exercises, patient education, home exercise program, joint mobilization and manual therapy  Frequency: 2x week  Duration in weeks: 6  Plan of Care beginning date: 2021  Plan of Care expiration date: 2021  Treatment plan discussed with: patient        Subjective Evaluation    History of Present Illness  Mechanism of injury: surgery  Mechanism of injury: TFR of L II  12/15/2020  Right Thumb TGR  8/3/2021          Recurrent probem    Quality of life: good    Pain  Current pain ratin  At best pain ratin  At worst pain ratin  Location: Left index  Quality: pressure, dull ache and tight  Relieving factors: ice and rest  Progression: improved (Improved greatly since DOS)    Hand dominance: right  Life stress: UA to golf    Treatments  Previous treatment: injection treatment  Current treatment: occupational therapy  Patient Goals  Patient goals for therapy: decreased edema, decreased pain, increased motion, increased strength, return to sport/leisure activities and independence with ADLs/IADLs  Patient goal: Able to golf;  painfree use of hand during HOS  Objective     Observations     Right Wrist/Hand   Positive for edema and incision  Additional Observation Details  Minimal edema in thumb;  Surgical site is mildly macerated due to wear of bandaid  Well approximated  No drainage, no signs of infection        Neurological Testing     Sensation     Wrist/Hand     Right   Intact: light touch    Active Range of Motion     Left Thumb   Flexion     MP: 60 degrees    DIP: 85 degrees  Extension     MP: 0 degrees    DIP: 30 degrees    Right Thumb Flexion     MP: 52    DIP: 68  Extension     MP: 0    DIP: 30    Strength/Myotome Testing     Additional Strength Details  Strength NT due early stage post op  Will be recorded at a later date       Swelling     Left Wrist/Hand   Thumb     Proximal: 6 8 cm    Right Wrist/Hand   Thumb     Proximal: 7 3 cm             Precautions:  universal      Date 8/9            Visit 1            Manuals                                                                 Neuro Re-Ed                                                                                                        Ther Ex 10'            HEP edema, TGE, pain                                                                                                       Ther Activity                                       Gait Training                                       Modalities

## 2021-08-12 ENCOUNTER — OFFICE VISIT (OUTPATIENT)
Dept: OCCUPATIONAL THERAPY | Facility: CLINIC | Age: 61
End: 2021-08-12
Payer: COMMERCIAL

## 2021-08-12 DIAGNOSIS — M65.311 TRIGGER FINGER OF RIGHT THUMB: Primary | ICD-10-CM

## 2021-08-12 PROCEDURE — 97140 MANUAL THERAPY 1/> REGIONS: CPT | Performed by: OCCUPATIONAL THERAPIST

## 2021-08-12 PROCEDURE — 97110 THERAPEUTIC EXERCISES: CPT | Performed by: OCCUPATIONAL THERAPIST

## 2021-08-12 NOTE — PROGRESS NOTES
Daily Note     Today's date: 2021  Patient name: Juan Rivas  : 1960  MRN: [id number]  Referring provider: Lenora Benoit MD  Dx:   Encounter Diagnosis     ICD-10-CM    1  Trigger finger of right thumb  M65 311                   Subjective:   "I used a  yesterday"      Objective: See treatment diary below    MOTION:  PROM   MP  0/75,  IP  0/60  AROM   MP  0/75,  0/42      Assessment: Tolerated treatment well  Patient would benefit from continued OT      Plan: Progress treatment as tolerated  Pt  Instructed to avoid resisted tasks for at least 10 days more         Precautions:  universal      Date            Visit 1 2           Manuals  15'           Jt  mobs  Thumb  MP/IP  5'           Scar care  Mass  5'           K taping  5'- MP/IP                        Neuro Re-Ed                                                                                                        Ther Ex 10'   10'           HEP edema, TGE, pain Review for pain           AROM blocking    Thumb IP   1x10           AROM active  Oppose  Tip to [facility]  x10                                                                            Ther Activity                                       Gait Training                                       Modalities

## 2021-08-16 ENCOUNTER — OFFICE VISIT (OUTPATIENT)
Dept: OCCUPATIONAL THERAPY | Facility: CLINIC | Age: 61
End: 2021-08-16
Payer: COMMERCIAL

## 2021-08-16 DIAGNOSIS — M65.311 TRIGGER FINGER OF RIGHT THUMB: Primary | ICD-10-CM

## 2021-08-16 PROCEDURE — 97110 THERAPEUTIC EXERCISES: CPT | Performed by: OCCUPATIONAL THERAPIST

## 2021-08-16 PROCEDURE — 97140 MANUAL THERAPY 1/> REGIONS: CPT | Performed by: OCCUPATIONAL THERAPIST

## 2021-08-16 NOTE — PROGRESS NOTES
OT Re-Evaluation  and OT Discharge     Today's date: 2021  Patient name: Shelley Logan  : 1960  MRN: [id number]  Referring provider: Bala Arzate MD  Dx:   Encounter Diagnosis     ICD-10-CM    1  Trigger finger of right thumb  M65 311                   Assessment  Assessment details: IE  64 y/o right handed male who presents one week post DOS for TGR of right thumb  Surgical site well approximated without signs of infection  Slight macerated due to wear of bandaid  Examination reveals mild edema and pain, decreased motion,no sensory complaints  Strength not assessed due to early healing post DOS  Evaluation is of low complexity, due to minimal comorbidities and stable clinical presentation  Pt demonstrates good tolerance of therapy today and was provided with a written HEP focusing on edema control, post op tissue care,  And Gentle AROM exercises  Pt was instructed to perform HEP daily tid  The patient demonstrates HEP instructions appropriately, verbalizes understanding, and is in agreement with the written HEP  Pt will benefit from skilled OT intervention to progress motion, reduce pain and edema, increased strength, and  and allow return to PLOF  Patient will be able to return to golf and home care with minimal difficulty by discharge      RE/Discharge  Marvin Byers has attended three therapy visit since IE on 21  He presents today with functional motion, minimal pain and edema, and function  and pinch strength  LTG and STG have been met at well as FOTO goal   He is 2 weeks post DOS and reports excellent return to self care, ADLs, and light golfing  He will continue to progress his motion and strength on HEP and will progress to full golfing over the next 2 weeks  All symptoms resolved       Impairments: abnormal or restricted ROM, impaired physical strength, lacks appropriate home exercise program and pain with function  Functional limitations: UA to  with force, open jars, do resisted exercises, Pain with gripping and pulling/pushing  One week post DOS  Symptom irritability: lowBarriers to therapy: none  Understanding of Dx/Px/POC: excellent  Goals  STG   1  Increase thumb IP flexion by 10 degrees  MET  2   Reduce edema by at least  2 cm MET  3  Pain with flexion less than 2/10 with self care MET    LTG  1  Composite opposition to WNL MET  2  Resolve edema to a minimal level MET  3  Pinch of right  to at least 50% of left  MET  4  Return to golfing  MET    Plan  Plan details: Discharge to [facility]  Planned therapy interventions: patient education and home exercise program  Plan of Care beginning date: 2021  Plan of Care expiration date: 2021  Treatment plan discussed with: patient        Subjective Evaluation    History of Present Illness  Mechanism of injury: surgery  Mechanism of injury: TFR of L II  12/15/2020  Right Thumb TGR  8/3/2021          Recurrent probem    Quality of life: excellent    Pain  Current pain ratin  At best pain ratin  At worst pain ratin  Location: Left index  Quality: pressure and tight  Relieving factors: ice and rest  Progression: improved (Improved greatly since DOS)    Hand dominance: right  Life stress: UA to golf    Treatments  Previous treatment: injection treatment  Current treatment: occupational therapy  Patient Goals  Patient goals for therapy: decreased edema, decreased pain, increased motion, increased strength, return to sport/leisure activities and independence with ADLs/IADLs  Patient goal: Able to golf;  painfree use of hand during HOS  Objective     Observations     Right Wrist/Hand   Positive for edema  Additional Observation Details  Minimal edema in thumb; No signs of infection        Neurological Testing     Sensation     Wrist/Hand     Right   Intact: light touch    Active Range of Motion     Left Thumb   Flexion     MP: 60 degrees    DIP: 85 degrees  Extension     MP: 0 degrees    DIP: 30 degrees    Right Thumb   Flexion     MP: 60    DIP: 75  Extension     MP: 0    DIP: 30    Strength/Myotome Testing     Additional Strength Details  Lat pinch  L  17 lbs,  R  11 lbs  painfree    Swelling     Left Wrist/Hand   Thumb     Proximal: 6 8 cm    Right Wrist/Hand   Thumb     Proximal: 7 1 cm             Precautions:  universal      Date 8/9 8/12 8/16          Visit 1 2 3          Manuals  15' 10'          Jt  mobs  Thumb  MP/IP  5' 5'          Scar care  Mass  5' 5'          K taping  5'- MP/IP                        Neuro Re-Ed                                                                                                        Ther Ex 10'   10'   15'          HEP edema, TGE, pain Review for pain HEP for dischargeR IP flex, edema   Scar'  L II care;  PN   15'          AROM blocking    Thumb IP   1x10 1x10          AROM active  Oppose  Tip to [facility]  x10 x10                                                                           Ther Activity                                       Gait Training                                       Modalities

## 2021-08-19 ENCOUNTER — APPOINTMENT (OUTPATIENT)
Dept: OCCUPATIONAL THERAPY | Facility: CLINIC | Age: 61
End: 2021-08-19
Payer: COMMERCIAL

## 2021-08-23 ENCOUNTER — APPOINTMENT (OUTPATIENT)
Dept: OCCUPATIONAL THERAPY | Facility: CLINIC | Age: 61
End: 2021-08-23
Payer: COMMERCIAL

## 2021-11-24 ENCOUNTER — EVALUATION (OUTPATIENT)
Dept: OCCUPATIONAL THERAPY | Facility: CLINIC | Age: 61
End: 2021-11-24
Payer: COMMERCIAL

## 2021-11-24 DIAGNOSIS — M79.642 LEFT HAND PAIN: ICD-10-CM

## 2021-11-24 DIAGNOSIS — M65.322 TRIGGER FINGER, LEFT INDEX FINGER: Primary | ICD-10-CM

## 2021-11-24 PROCEDURE — 97165 OT EVAL LOW COMPLEX 30 MIN: CPT | Performed by: OCCUPATIONAL THERAPIST

## 2021-11-24 PROCEDURE — 97110 THERAPEUTIC EXERCISES: CPT | Performed by: OCCUPATIONAL THERAPIST

## 2021-12-08 ENCOUNTER — OFFICE VISIT (OUTPATIENT)
Dept: OCCUPATIONAL THERAPY | Facility: CLINIC | Age: 61
End: 2021-12-08
Payer: COMMERCIAL

## 2021-12-08 DIAGNOSIS — M79.642 LEFT HAND PAIN: ICD-10-CM

## 2021-12-08 DIAGNOSIS — M65.322 TRIGGER FINGER, LEFT INDEX FINGER: Primary | ICD-10-CM

## 2021-12-08 PROCEDURE — 97110 THERAPEUTIC EXERCISES: CPT | Performed by: OCCUPATIONAL THERAPIST

## 2021-12-08 PROCEDURE — 97035 APP MDLTY 1+ULTRASOUND EA 15: CPT | Performed by: OCCUPATIONAL THERAPIST

## 2021-12-08 PROCEDURE — 97140 MANUAL THERAPY 1/> REGIONS: CPT | Performed by: OCCUPATIONAL THERAPIST

## 2021-12-27 ENCOUNTER — APPOINTMENT (OUTPATIENT)
Dept: OCCUPATIONAL THERAPY | Facility: CLINIC | Age: 61
End: 2021-12-27
Payer: COMMERCIAL

## 2021-12-28 ENCOUNTER — OFFICE VISIT (OUTPATIENT)
Dept: OCCUPATIONAL THERAPY | Facility: CLINIC | Age: 61
End: 2021-12-28
Payer: COMMERCIAL

## 2021-12-28 DIAGNOSIS — M79.642 LEFT HAND PAIN: ICD-10-CM

## 2021-12-28 DIAGNOSIS — M65.322 TRIGGER FINGER, LEFT INDEX FINGER: Primary | ICD-10-CM

## 2021-12-28 PROCEDURE — 97110 THERAPEUTIC EXERCISES: CPT | Performed by: OCCUPATIONAL THERAPIST

## 2021-12-28 PROCEDURE — 97035 APP MDLTY 1+ULTRASOUND EA 15: CPT | Performed by: OCCUPATIONAL THERAPIST

## 2021-12-28 PROCEDURE — 97140 MANUAL THERAPY 1/> REGIONS: CPT | Performed by: OCCUPATIONAL THERAPIST

## 2022-01-05 ENCOUNTER — OFFICE VISIT (OUTPATIENT)
Dept: OCCUPATIONAL THERAPY | Facility: CLINIC | Age: 62
End: 2022-01-05
Payer: COMMERCIAL

## 2022-01-05 DIAGNOSIS — M65.322 TRIGGER FINGER, LEFT INDEX FINGER: Primary | ICD-10-CM

## 2022-01-05 DIAGNOSIS — M79.642 LEFT HAND PAIN: ICD-10-CM

## 2022-01-05 PROCEDURE — 97110 THERAPEUTIC EXERCISES: CPT | Performed by: OCCUPATIONAL THERAPIST

## 2022-01-05 PROCEDURE — 97035 APP MDLTY 1+ULTRASOUND EA 15: CPT | Performed by: OCCUPATIONAL THERAPIST

## 2022-01-05 PROCEDURE — 97140 MANUAL THERAPY 1/> REGIONS: CPT | Performed by: OCCUPATIONAL THERAPIST

## 2022-01-05 NOTE — PROGRESS NOTES
Daily Note     Today's date: 2022  Patient name: Josh Bunch  : 1960  MRN: [id number]  Referring provider: Suzette Stewart MD  Dx:   Encounter Diagnosis     ICD-10-CM    1  Trigger finger, left index finger  M65 322    2  Left hand pain  M79 642                   Subjective:  " I am feeling better, bending more, less catching"  "The splint and elastic are helping"    Objective: See treatment diary below      MOTION:  Full PROM achieved    Assessment: Tolerated treatment well  Patient would benefit from continued OT;  Progress ex as pain allows  Plan: Continue per plan of care  Continue Hep for splinting during HOS and stretching  Precautions:  universal      Manuals          STM  L volar II MP,  R  Thumb MP crease  5' 5' 5'         IASTM NV 7' 7' 7'         Jt  mobs  3' 3' 3'                      Neuro Re-Ed                                                     NV 8'  8' 8'                                                Ther Ex 15   15'   15'   15'         HEP L Night gutter, day elastic, R Th elastomer Review HEP, deep mass  L II, R TH  8' Cont  Splint HOS and day elastic  6' 6'         PROM   L II  R Th  5' 6' 6'         AROM TGE  II TGE   PIP  2x10 2x10 2x10                                                                          Ther Activity                                       Gait Training                                       Modalities

## 2022-01-12 ENCOUNTER — OFFICE VISIT (OUTPATIENT)
Dept: OCCUPATIONAL THERAPY | Facility: CLINIC | Age: 62
End: 2022-01-12
Payer: COMMERCIAL

## 2022-01-12 DIAGNOSIS — M79.642 LEFT HAND PAIN: ICD-10-CM

## 2022-01-12 DIAGNOSIS — M65.322 TRIGGER FINGER, LEFT INDEX FINGER: Primary | ICD-10-CM

## 2022-01-12 PROCEDURE — 97110 THERAPEUTIC EXERCISES: CPT | Performed by: OCCUPATIONAL THERAPIST

## 2022-01-12 NOTE — PROGRESS NOTES
OT Re-Evaluation  and OT Discharge     Today's date: 2022  Patient name: Becky Elizondo  : 1960  MRN: [id number]  Referring provider: Saeed Khoury MD  Dx:   Encounter Diagnosis     ICD-10-CM    1  Trigger finger, left index finger  M65 322    2  Left hand pain  M79 642                   Assessment  Assessment details: IE  Sherry Mendoza is a 62 y/o right handed male who has been treated previously for an edematous and painful left index finger s/p TGR with DOS in 2020  He returns today with residual edema and pain of the index finger especially with hook fisting  He complains of palpable catching, but no locking, of the index finger with repetitive tasks  Thick tissue remains over the A1 pulley and mild chronc edema remains in the PIP joint  Pain at rest resolved, pain with activity described as uncomfortable or tightness  Examination reveals functional motion of the index finger with mild deficits in end range flexion and extension, minimal pain, mild brawny edema of the PIP joint  No sensory complaints and normal daily function possible with few exceptions  Patient reports return to golf without restrictions  Evaluation is of low complexity, due to minimal comorbidities and stable clinical presentation  Pt demonstrates good tolerance of therapy today and was provided with a HEP focusing on night extension splinting with edema control, daytime elastic flexion strap use, and night elastomer use  Pt was instructed to perform HEP daily as tolerated  The patient demonstrates HEP instructions appropriately, verbalizes understanding, and is in agreement with the HEP  Pt will benefit from skilled OT intervention to progress motion, resolve edema,  and allow return to PLOF without restrictions  Velázquez Ramiro RE/Discharge  Sherry Mendoza has attended 5 therapy visits since his IE on 21  He presents today with functional motion and strength  Minimal edema  Absent pain    He expresses satisfaction with achieve motion and function with the left index finger  Mild palpable crepitus remains present in the PIP joint, however, not painful  Symptom irritability: lowBarriers to therapy: none  Understanding of Dx/Px/POC: excellent  Goals  STG  2 weels  1  Increase PIP flexion to more than 90 degrees MET  2   Reduce edema by at least  2 cm  MET  3  Pain with flexion less than 3/10 MET  4  Compliance to HEP     LTG  Discharge  1  Composite flexion to WNL MET  2  Resolve edema to a minimal level MET  3    of left to allow jar opening without assistance MET  4  Return to golfing  MET    Plan  Plan details: Discharge to [facility]  Planned therapy interventions: home exercise program  Frequency: 1x week  Treatment plan discussed with: patient        Subjective Evaluation    History of Present Illness  Date of surgery: 12/15/2020  Mechanism of injury: surgery  Mechanism of injury: Chronic trigger finger, L II;   TFR of L II  12/15/2020  Quality of life: excellent    Pain  Current pain ratin  At best pain ratin  At worst pain ratin  Location: Left index  Quality: tight (Catches)  Relieving factors: heat  Progression: improved    Hand dominance: right    Treatments  Previous treatment: occupational therapy  Current treatment: occupational therapy  Patient Goals  Patient goals for therapy: decreased edema, decreased pain, increased motion, increased strength, return to sport/leisure activities and independence with ADLs/IADLs  Patient goal: Resolve catching, triggering of left index, right thumb        Objective     Observations     Left Wrist/Hand   Negative for edema  Palpation     Additional Palpation Details  Tenderness surrounding surgical site  Mildly edematous      Neurological Testing     Sensation     Wrist/Hand   Left   Intact: light touch    Active Range of Motion     Left Digits    Flexion   Index     MCP: 90    PIP: 97    DIP: 70  Extension   Index     MCP: 0    PIP: -3    DIP: 0    Right Digits   Flexion   Index     MCP: 90    PIP: 102    DIP: 74    Strength/Myotome Testing     Additional Strength Details  Broderick #2  L  102 lbs,  R  96 lbs  Lat pinch  L  13 6 lbs,  R  15 8 lbs  Swelling     Left Wrist/Hand   Index     Middle: 6 5 cm    Right Wrist/Hand   Index     Middle: 6 4 cm                    Daily Note     Today's date: 2022  Patient name: Tiffany Michaels  : 1960  MRN: [id number]  Referring provider: Clayton Baptiste MD  Dx:   Encounter Diagnosis     ICD-10-CM    1  Trigger finger, left index finger  M65 322    2  Left hand pain  M79 642                   Subjective:  " I am feeling better, bending more, less catching"  "It really doesn't bother me now, I can do everything"    Objective: See treatment diary below    Assessment: Tolerated treatment well  Therapy goals met  Plan: Discharge to HEP  Continue HEP with splinting during HOS and daily stretching  Precautions:  universal      Manuals         STM  L volar II MP,  R  Thumb MP crease  5' 5' 5'         IASTM NV 7' 7' 7'         Jt  mobs  3' 3' 3'                      Neuro Re-Ed                                                    US NV 8'  8' 8'                                                Ther Ex 15   15'   15'   15'   25'        HEP L Night gutter, day elastic, R Th elastomer Review HEP, deep mass  L II, R TH  8' Cont  Splint HOS and day elastic  6' 6' HEP for d/c;  RE;  HEP review         PROM   L II  R Th  5' 6' 6' 6'        AROM TGE  II TGE   PIP  2x10 2x10 2x10                                                                          Ther Activity                                       Gait Training                                       Modalities

## 2022-01-12 NOTE — LETTER
2022    Zeferino Johnson MD  [address]  [place] [id number] [facility] [address]    Patient: Shamir Woods   YOB: 1960   Date of Visit: 2022     Encounter Diagnosis     ICD-10-CM    1  Trigger finger, left index finger  M65 322    2  Left hand pain  M79 642        Dear Dr Maite Mcleod: Thank you for your recent referral of Shamir Woods  Please review the attached evaluation summary from Peter's recent visit  Please verify that you agree with the plan of care by signing the attached order  If you have any questions or concerns, please do not hesitate to call  I sincerely appreciate the opportunity to share in the care of one of your patients and hope to have another opportunity to work with you in the near future  Sincerely,    Alli Euceda, OT      Referring Provider:     I certify that I have read the below Plan of Care and certify the need for these services furnished under this plan of treatment while under my care  Zeferino Johnson MD  [place] [address]  Via Fax: [phone]        OT Re-Evaluation  and OT Discharge     Today's date: 2022  Patient name: Shamir Woods  : 1960  MRN: [id number]  Referring provider: Ruslan Adam MD  Dx:   Encounter Diagnosis     ICD-10-CM    1  Trigger finger, left index finger  M65 322    2  Left hand pain  M79 642                   Assessment  Assessment details: MELLY Santana is a 62 y/o right handed male who has been treated previously for an edematous and painful left index finger s/p TGR with DOS in 2020  He returns today with residual edema and pain of the index finger especially with hook fisting  He complains of palpable catching, but no locking, of the index finger with repetitive tasks  Thick tissue remains over the A1 pulley and mild chronc edema remains in the PIP joint    Pain at rest resolved, pain with activity described as uncomfortable or tightness  Examination reveals functional motion of the index finger with mild deficits in end range flexion and extension, minimal pain, mild brawny edema of the PIP joint  No sensory complaints and normal daily function possible with few exceptions  Patient reports return to golf without restrictions  Evaluation is of low complexity, due to minimal comorbidities and stable clinical presentation  Pt demonstrates good tolerance of therapy today and was provided with a HEP focusing on night extension splinting with edema control, daytime elastic flexion strap use, and night elastomer use  Pt was instructed to perform HEP daily as tolerated  The patient demonstrates HEP instructions appropriately, verbalizes understanding, and is in agreement with the HEP  Pt will benefit from skilled OT intervention to progress motion, resolve edema,  and allow return to PLOF without restrictions  Nelson Santana RE/Discharge  Olga Adams has attended 5 therapy visits since his IE on 11/24/21  He presents today with functional motion and strength  Minimal edema  Absent pain  He expresses satisfaction with achieve motion and function with the left index finger  Mild palpable crepitus remains present in the PIP joint, however, not painful  Symptom irritability: lowBarriers to therapy: none  Understanding of Dx/Px/POC: excellent  Goals  STG  2 weels  1  Increase PIP flexion to more than 90 degrees MET  2   Reduce edema by at least  2 cm  MET  3  Pain with flexion less than 3/10 MET  4  Compliance to HEP     LTG  Discharge  1  Composite flexion to WNL MET  2  Resolve edema to a minimal level MET  3    of left to allow jar opening without assistance MET  4  Return to golfing   MET    Plan  Plan details: Discharge to [facility]  Planned therapy interventions: home exercise program  Frequency: 1x week  Treatment plan discussed with: patient        Subjective Evaluation    History of Present Illness  Date of surgery: 12/15/2020  Mechanism of injury: surgery  Mechanism of injury: Chronic trigger finger, L II;   TFR of L II  12/15/2020  Quality of life: excellent    Pain  Current pain ratin  At best pain ratin  At worst pain ratin  Location: Left index  Quality: tight (Catches)  Relieving factors: heat  Progression: improved    Hand dominance: right    Treatments  Previous treatment: occupational therapy  Current treatment: occupational therapy  Patient Goals  Patient goals for therapy: decreased edema, decreased pain, increased motion, increased strength, return to sport/leisure activities and independence with ADLs/IADLs  Patient goal: Resolve catching, triggering of left index, right thumb        Objective     Observations     Left Wrist/Hand   Negative for edema  Palpation     Additional Palpation Details  Tenderness surrounding surgical site  Mildly edematous  Neurological Testing     Sensation     Wrist/Hand   Left   Intact: light touch    Active Range of Motion     Left Digits    Flexion   Index     MCP: 90    PIP: 97    DIP: 70  Extension   Index     MCP: 0    PIP: -3    DIP: 0    Right Digits   Flexion   Index     MCP: 90    PIP: 102    DIP: 74    Strength/Myotome Testing     Additional Strength Details  Broderick #2  L  102 lbs,  R  96 lbs  Lat pinch  L  13 6 lbs,  R  15 8 lbs  Swelling     Left Wrist/Hand   Index     Middle: 6 5 cm    Right Wrist/Hand   Index     Middle: 6 4 cm                    Daily Note     Today's date: 2022  Patient name: Lena Durant  : 1960  MRN: [id number]  Referring provider: Marline Coleman MD  Dx:   Encounter Diagnosis     ICD-10-CM    1  Trigger finger, left index finger  M65 322    2  Left hand pain  M79 642                   Subjective:  " I am feeling better, bending more, less catching"  "It really doesn't bother me now, I can do everything"    Objective: See treatment diary below    Assessment: Tolerated treatment well   Therapy goals met       Plan: Discharge to HEP  Continue HEP with splinting during HOS and daily stretching  Precautions:  universal      Manuals 11/24 12/8 12/28 1/5 1/12        STM  L volar II MP,  R  Thumb MP crease  5' 5' 5'         IASTM NV 7' 7' 7'         Jt  mobs  3' 3' 3'                      Neuro Re-Ed                                                     NV 8'  8' 8'                                                Ther Ex 15   15'   15'   15'   25'        HEP L Night gutter, day elastic, R Th elastomer Review HEP, deep mass  L II, R TH  8' Cont  Splint HOS and day elastic  6' 6' HEP for d/c;  RE;  HEP review         PROM   L II  R Th  5' 6' 6' 6'        AROM TGE  II TGE   PIP  2x10 2x10 2x10                                                                          Ther Activity                                       Gait Training                                       Modalities

## 2022-01-19 ENCOUNTER — APPOINTMENT (OUTPATIENT)
Dept: OCCUPATIONAL THERAPY | Facility: CLINIC | Age: 62
End: 2022-01-19
Payer: COMMERCIAL

## 2022-01-29 ENCOUNTER — APPOINTMENT (OUTPATIENT)
Dept: LAB | Facility: CLINIC | Age: 62
End: 2022-01-29
Payer: COMMERCIAL

## 2022-01-29 DIAGNOSIS — R35.0 URINARY FREQUENCY: ICD-10-CM

## 2022-01-29 DIAGNOSIS — Z13.6 SCREENING FOR CARDIOVASCULAR CONDITION: ICD-10-CM

## 2022-01-29 DIAGNOSIS — R52 WHOLE BODY PAIN: ICD-10-CM

## 2022-01-29 DIAGNOSIS — Z11.59 NEED FOR HEPATITIS C SCREENING TEST: ICD-10-CM

## 2022-01-29 DIAGNOSIS — Z11.4 SCREENING FOR HUMAN IMMUNODEFICIENCY VIRUS: ICD-10-CM

## 2022-01-29 DIAGNOSIS — Z13.29 THYROID DISORDER SCREEN: ICD-10-CM

## 2022-01-29 LAB
ALBUMIN SERPL BCP-MCNC: 3.9 G/DL (ref 3.5–5)
ALP SERPL-CCNC: 59 U/L (ref 46–116)
ALT SERPL W P-5'-P-CCNC: 34 U/L (ref 12–78)
ANION GAP SERPL CALCULATED.3IONS-SCNC: 3 MMOL/L (ref 4–13)
AST SERPL W P-5'-P-CCNC: 16 U/L (ref 5–45)
BASOPHILS # BLD AUTO: 0.12 THOUSANDS/ΜL (ref 0–0.1)
BASOPHILS NFR BLD AUTO: 2 % (ref 0–1)
BILIRUB SERPL-MCNC: 1.13 MG/DL (ref 0.2–1)
BUN SERPL-MCNC: 15 MG/DL (ref 5–25)
CALCIUM SERPL-MCNC: 9.3 MG/DL (ref 8.3–10.1)
CHLORIDE SERPL-SCNC: 106 MMOL/L (ref 100–108)
CHOLEST SERPL-MCNC: 232 MG/DL
CO2 SERPL-SCNC: 30 MMOL/L (ref 21–32)
CREAT SERPL-MCNC: 0.72 MG/DL (ref 0.6–1.3)
EOSINOPHIL # BLD AUTO: 0.12 THOUSAND/ΜL (ref 0–0.61)
EOSINOPHIL NFR BLD AUTO: 2 % (ref 0–6)
ERYTHROCYTE [DISTWIDTH] IN BLOOD BY AUTOMATED COUNT: 12.4 % (ref 11.6–15.1)
GFR SERPL CREATININE-BSD FRML MDRD: 102 ML/MIN/1.73SQ M
GLUCOSE P FAST SERPL-MCNC: 91 MG/DL (ref 65–99)
HCT VFR BLD AUTO: 46.7 % (ref 36.5–49.3)
HCV AB SER QL: NORMAL
HDLC SERPL-MCNC: 82 MG/DL
HGB BLD-MCNC: 16.2 G/DL (ref 12–17)
IMM GRANULOCYTES # BLD AUTO: 0.01 THOUSAND/UL (ref 0–0.2)
IMM GRANULOCYTES NFR BLD AUTO: 0 % (ref 0–2)
LDLC SERPL CALC-MCNC: 139 MG/DL (ref 0–100)
LYMPHOCYTES # BLD AUTO: 2.12 THOUSANDS/ΜL (ref 0.6–4.47)
LYMPHOCYTES NFR BLD AUTO: 38 % (ref 14–44)
MCH RBC QN AUTO: 33.1 PG (ref 26.8–34.3)
MCHC RBC AUTO-ENTMCNC: 34.7 G/DL (ref 31.4–37.4)
MCV RBC AUTO: 95 FL (ref 82–98)
MONOCYTES # BLD AUTO: 0.61 THOUSAND/ΜL (ref 0.17–1.22)
MONOCYTES NFR BLD AUTO: 11 % (ref 4–12)
NEUTROPHILS # BLD AUTO: 2.56 THOUSANDS/ΜL (ref 1.85–7.62)
NEUTS SEG NFR BLD AUTO: 47 % (ref 43–75)
NONHDLC SERPL-MCNC: 150 MG/DL
NRBC BLD AUTO-RTO: 0 /100 WBCS
PLATELET # BLD AUTO: 292 THOUSANDS/UL (ref 149–390)
PMV BLD AUTO: 8.8 FL (ref 8.9–12.7)
POTASSIUM SERPL-SCNC: 3.9 MMOL/L (ref 3.5–5.3)
PROT SERPL-MCNC: 7.6 G/DL (ref 6.4–8.2)
PSA SERPL-MCNC: 1.2 NG/ML (ref 0–4)
RBC # BLD AUTO: 4.9 MILLION/UL (ref 3.88–5.62)
SODIUM SERPL-SCNC: 139 MMOL/L (ref 136–145)
TRIGL SERPL-MCNC: 56 MG/DL
TSH SERPL DL<=0.05 MIU/L-ACNC: 2.47 UIU/ML (ref 0.36–3.74)
WBC # BLD AUTO: 5.54 THOUSAND/UL (ref 4.31–10.16)

## 2022-01-29 PROCEDURE — 36415 COLL VENOUS BLD VENIPUNCTURE: CPT

## 2022-01-29 PROCEDURE — 87389 HIV-1 AG W/HIV-1&-2 AB AG IA: CPT

## 2022-01-29 PROCEDURE — 80053 COMPREHEN METABOLIC PANEL: CPT

## 2022-01-29 PROCEDURE — 85025 COMPLETE CBC W/AUTO DIFF WBC: CPT

## 2022-01-29 PROCEDURE — 84153 ASSAY OF PSA TOTAL: CPT

## 2022-01-29 PROCEDURE — 84443 ASSAY THYROID STIM HORMONE: CPT

## 2022-01-29 PROCEDURE — 87086 URINE CULTURE/COLONY COUNT: CPT

## 2022-01-29 PROCEDURE — 86618 LYME DISEASE ANTIBODY: CPT

## 2022-01-29 PROCEDURE — 80061 LIPID PANEL: CPT

## 2022-01-29 PROCEDURE — 86803 HEPATITIS C AB TEST: CPT

## 2022-01-30 LAB — BACTERIA UR CULT: NORMAL

## 2022-01-31 LAB — HIV 1+2 AB+HIV1 P24 AG SERPL QL IA: NORMAL

## 2022-02-01 LAB — B BURGDOR IGG+IGM SER-ACNC: 32

## 2023-05-05 ENCOUNTER — EVALUATION (OUTPATIENT)
Dept: PHYSICAL THERAPY | Facility: CLINIC | Age: 63
End: 2023-05-05

## 2023-05-05 DIAGNOSIS — M43.16 SPONDYLOLISTHESIS OF LUMBAR REGION: Primary | ICD-10-CM

## 2023-05-05 NOTE — LETTER
May 5, 2023    Carol Burns PA-C  [address] [facility]   [address]  [facility] [address]    Patient: Lena Walsh   YOB: 1960   Date of Visit: 2023     Encounter Diagnosis     ICD-10-CM    1  Spondylolisthesis of lumbar region  M43 16           Dear Dr Ebonie Maza: Thank you for your recent referral of Lena Walsh  Please review the attached evaluation summary from Peter's recent visit  Please verify that you agree with the plan of care by signing the attached order  If you have any questions or concerns, please do not hesitate to call  I sincerely appreciate the opportunity to share in the care of one of your patients and hope to have another opportunity to work with you in the near future  Sincerely,    Kenneth Reyes, PT      Referring Provider:      I certify that I have read the below Plan of Care and certify the need for these services furnished under this plan of treatment while under my care  Carol Burns PA-C  [address] [phone]  [place]          PT Evaluation     Today's date: 2023  Patient name: Lena Walsh  : 1960  MRN: [id number]  Referring provider: Godfrey Harris PA-C  Dx:   Encounter Diagnosis     ICD-10-CM    1  Spondylolisthesis of lumbar region  M43 16           Start Time: 1245  Stop Time: 1335  Total time in clinic (min): 50 minutes    Assessment  Assessment details: Patient is a 63year old male who presents to OP PT with chief complaints of low back pain due to grade 1 lumbar spondylolisthesis  They present with full lumbar ROM, good LE strength, and no signs of neural tension  Deficits include limited hip flexor ROM, activity intolerance, and tenderness to palpation  Functionally, they have difficulty with lifting objects from the floor, sleeping, playing golf, standing, negotiating stairs, and washing dishes    Patient was given planks, side planks, and kneeling hip flexor stretch as HEP  Patient would benefit from skilled PT services in order to address the stated deficits and return to PLOF  Impairments: abnormal or restricted ROM, activity intolerance, lacks appropriate home exercise program, pain with function and poor posture     Symptom irritability: moderate  Goals  ST  Patient will be independent with HEP in 4 weeks  2  Patient will decrease pain by 2 points in 4 weeks  LT  Patient will be able to squat to lift an object off of the floor without limitation in 8 weeks  2  Patient will be able to stand for >30 minutes without limitation in 8 weeks  3  Patient will be able to sleep through the night with minimal limitation in 8 weeks  4  Patient will be able to play golf with minimal limitation in 8 weeks    Plan  Patient would benefit from: skilled physical therapy  Planned modality interventions: thermotherapy: hydrocollator packs  Planned therapy interventions: abdominal trunk stabilization, activity modification, ADL retraining, functional ROM exercises, flexibility, graded activity, graded exercise, home exercise program, therapeutic exercise, therapeutic activities, stretching, strengthening, patient education, neuromuscular re-education, motor coordination training, massage and manual therapy  Frequency: 2x week  Duration in visits: 16  Duration in weeks: 8  Plan of Care beginning date: 2023  Plan of Care expiration date: 2023  Treatment plan discussed with: patient        Subjective Evaluation    History of Present Illness  Mechanism of injury: History of Present Illness  Mechanism of injury: Patient reports to OP PT with chronic low back pain for 5 years  Foam rollers, massage balls, and exercise help  Xray in  revealed grade 1 spondylolisthesis but xray last week did not seem significant for slippage  He has not run in 10 years  It improves with movement and improves throughout the day    Mornings are the worst  Sleeping, standing, walking uphill, and walking golf course without sitting are problematic  Pain  Current pain ratin  At best pain ratin  At worst pain ratin  Location: low back     Patient Goals  Patient goals for therapy: decreased pain, independence with ADLs/IADLs and return to sport/leisure activities        Objective     General Comments:      Lumbar Comments  Postural Observations     Additional Postural Observation Details  Stands with anterior pelvic tilt     Tenderness     Additional Tenderness Details  Diffuse tenderness across lumbosacral region     Active Range of Motion      Lumbar   Flexion:  WFL  Extension:  WFL  Left lateral flexion:  WFL  Right lateral flexion:  WFL  Left rotation:  [facility]  Right rotation:  [facility]     Tests     Additional Tests Details  + arabella test bilaterally with knee extension      Flowsheet Rows    Flowsheet Row Most Recent Value   PT/OT G-Codes    Current Score 63   Projected Score 71            Precautions: DM      POC expires Auth Status Unit limit Start date  Expiration date PT/OT + Visit Limit?    23 pend pend pend pend pend                                         Foto             Manuals                                                                 Neuro Re-Ed             TA contraction             TA+march             TA+SLR             Pallof press             Stir the pot             Plank HEP            Side plank HEP            Bird dog             Dead bug             Swimmers             PPT                          PT edu GS            Ther Ex             Kneeling hip flexor stretch HEP            Quad stretch                                                                              Treadmill             Ther Activity                                       Gait Training                                       Modalities

## 2023-05-05 NOTE — PROGRESS NOTES
PT Evaluation     Today's date: 2023  Patient name: Caryn Abdul  : 1960  MRN: [id number]  Referring provider: Diomedes Avila PA-C  Dx:   Encounter Diagnosis     ICD-10-CM    1  Spondylolisthesis of lumbar region  M43 16           Start Time: 1245  Stop Time: 1335  Total time in clinic (min): 50 minutes    Assessment  Assessment details: Patient is a 63year old male who presents to OP PT with chief complaints of low back pain due to grade 1 lumbar spondylolisthesis  They present with full lumbar ROM, good LE strength, and no signs of neural tension  Deficits include limited hip flexor ROM, activity intolerance, and tenderness to palpation  Functionally, they have difficulty with lifting objects from the floor, sleeping, playing golf, standing, negotiating stairs, and washing dishes  Patient was given planks, side planks, and kneeling hip flexor stretch as HEP  Patient would benefit from skilled PT services in order to address the stated deficits and return to PLOF  Impairments: abnormal or restricted ROM, activity intolerance, lacks appropriate home exercise program, pain with function and poor posture     Symptom irritability: moderate  Goals  ST  Patient will be independent with HEP in 4 weeks  2  Patient will decrease pain by 2 points in 4 weeks  LT  Patient will be able to squat to lift an object off of the floor without limitation in 8 weeks  2  Patient will be able to stand for >30 minutes without limitation in 8 weeks  3  Patient will be able to sleep through the night with minimal limitation in 8 weeks  4   Patient will be able to play golf with minimal limitation in 8 weeks    Plan  Patient would benefit from: skilled physical therapy  Planned modality interventions: thermotherapy: hydrocollator packs  Planned therapy interventions: abdominal trunk stabilization, activity modification, ADL retraining, functional ROM exercises, flexibility, graded activity, graded exercise, home exercise program, therapeutic exercise, therapeutic activities, stretching, strengthening, patient education, neuromuscular re-education, motor coordination training, massage and manual therapy  Frequency: 2x week  Duration in visits: 16  Duration in weeks: 8  Plan of Care beginning date: 2023  Plan of Care expiration date: 2023  Treatment plan discussed with: patient        Subjective Evaluation    History of Present Illness  Mechanism of injury: History of Present Illness  Mechanism of injury: Patient reports to OP PT with chronic low back pain for 5 years  Foam rollers, massage balls, and exercise help  Xray in  revealed grade 1 spondylolisthesis but xray last week did not seem significant for slippage  He has not run in 10 years  It improves with movement and improves throughout the day  Mornings are the worst   Sleeping, standing, walking uphill, and walking golf course without sitting are problematic  Pain  Current pain ratin  At best pain ratin  At worst pain ratin  Location: low back     Patient Goals  Patient goals for therapy: decreased pain, independence with ADLs/IADLs and return to sport/leisure activities        Objective     General Comments:      Lumbar Comments  Postural Observations     Additional Postural Observation Details  Stands with anterior pelvic tilt     Tenderness     Additional Tenderness Details  Diffuse tenderness across lumbosacral region     Active Range of Motion      Lumbar   Flexion:  WFL  Extension:  WFL  Left lateral flexion:  WFL  Right lateral flexion:  WFL  Left rotation:  WFL  Right rotation:  [facility]     Tests     Additional Tests Details  + arabella test bilaterally with knee extension      Flowsheet Rows    Flowsheet Row Most Recent Value   PT/OT G-Codes    Current Score 63   Projected Score 71             Precautions: DM      POC expires Auth Status Unit limit Start date  Expiration date PT/OT + Visit Limit?    23 pend pend pend pend pend Foto 5/5            Manuals 5/5                                                                Neuro Re-Ed             TA contraction             TA+march             TA+SLR             Pallof press             Stir the pot             Plank HEP            Side plank HEP            Bird dog             Dead bug             Swimmers             PPT                          PT edu GS            Ther Ex             Kneeling hip flexor stretch HEP            Quad stretch                                                                              Treadmill             Ther Activity                                       Gait Training                                       Modalities

## 2023-05-08 ENCOUNTER — OFFICE VISIT (OUTPATIENT)
Dept: PHYSICAL THERAPY | Facility: CLINIC | Age: 63
End: 2023-05-08

## 2023-05-08 DIAGNOSIS — M43.16 SPONDYLOLISTHESIS OF LUMBAR REGION: Primary | ICD-10-CM

## 2023-05-08 NOTE — PROGRESS NOTES
Daily Note     Today's date: 2023  Patient name: Quentin Alvarez  : 1960  MRN: [id number]  Referring provider: No ref  provider found  Dx:   Encounter Diagnosis     ICD-10-CM    1  Spondylolisthesis of lumbar region  M43 16           Start Time: 1515  Stop Time: 1600  Total time in clinic (min): 45 minutes    Subjective: Patient reports that the hip flexor stretch feels pretty good  Patient states that laying on his physioball facedown provides the most relief  He feels that he needs to try bending forward to work out the tightness  Objective: See treatment diary below      Assessment: Patient demonstrates consistent lumbar ROM and activity tolerance this session  Patient completed today's session without increase in pain  MRI this morning reveals foraminal narrowing and bulging disc at L4-L5  Prone press up result in no pain  Patient appears to respond well to extension based exercises  Gave patient PPU, piriformis stretch, supine nerve glide, bridges, and bird dogs for HEP  Future sessions should continue to progress extension exercises as able  Tolerated treatment well  Patient would benefit from continued PT        Plan: Continue per plan of care  Precautions: DM      POC expires Auth Status Unit limit Start date  Expiration date PT/OT + Visit Limit?    23 pend pend pend pend pend                                         Foto             Manuals                                                                Neuro Re-Ed             TA contraction             TA+march             TA+SLR             Pallof press             Stir the pot             Plank HEP            Side plank HEP            Bird dog  2x10           Dead bug             Swimmers             PPT                          Supine nerve gliding  2x10 ea           Assessment  GS           PT edu GS            Ther Ex             Kneeling hip flexor stretch HEP            Quad stretch             [employer] "2x10           PPU  2x20           Supine piriformis stretch  3x30\"                                     Treadmill             Ther Activity                                       Gait Training                                       Modalities                                            "

## 2023-05-11 ENCOUNTER — OFFICE VISIT (OUTPATIENT)
Dept: PHYSICAL THERAPY | Facility: CLINIC | Age: 63
End: 2023-05-11

## 2023-05-11 DIAGNOSIS — M43.16 SPONDYLOLISTHESIS OF LUMBAR REGION: Primary | ICD-10-CM

## 2023-05-11 NOTE — PROGRESS NOTES
"Daily Note     Today's date: 2023  Patient name: Jese Medina  : 1960  MRN: [id number]  Referring provider: No ref  provider found  Dx:   Encounter Diagnosis     ICD-10-CM    1  Spondylolisthesis of lumbar region  M43 16           Start Time: 1247  Stop Time: 1330  Total time in clinic (min): 43 minutes    Subjective: Patient reports that his back was bothering him yesterday, but is feeling a little better today  Patient notes that forward bending is when he feels the discomfort  Objective: See treatment diary below      Assessment: Patient demonstrates improving lumbar mobility this session  Patient completed today's session without increase in pain  Continued focus on extension based exercises and hip stretching  Educated patient that forced stretching into lumbar flexion is not desired at this time as it will only continue to push the disc material in a non-desirable direction  Patient expressed understanding  Future sessions should continue to progress lumbar ROM and flexibility exercises as able  Tolerated treatment well  Patient would benefit from continued PT    Plan: Continue per plan of care  Precautions: DM      POC expires Auth Status Unit limit Start date  Expiration date PT/OT + Visit Limit?    23 auth- 52 visits 4 units 23 52 hard max                                         Foto             Manuals                                                               Neuro Re-Ed             TA contraction             +march             TA+SLR             Pallof press             Stir the pot             Plank HEP  2x30\"          Side plank HEP  2x15\" ea          Bird dog  2x10           Dead bug             Swimmers             PPT                          Supine nerve gliding  2x10 ea 2x10  ea          Assessment  GS           PT edu GS            Ther Ex             Kneeling hip flexor stretch HEP  3x30\"          Quad stretch           " "  Bridges  2x10 2x10          PPU  2x20 2x20          Supine piriformis stretch  3x30\" 3x30\"          [place] stretch   3x30\"                       Treadmill             Ther Activity                                       Gait Training                                       Modalities                                            "

## 2023-05-16 ENCOUNTER — OFFICE VISIT (OUTPATIENT)
Dept: PHYSICAL THERAPY | Facility: CLINIC | Age: 63
End: 2023-05-16

## 2023-05-16 DIAGNOSIS — M43.16 SPONDYLOLISTHESIS OF LUMBAR REGION: Primary | ICD-10-CM

## 2023-05-16 NOTE — PROGRESS NOTES
"Daily Note     Today's date: 2023  Patient name: Quentin Alvarez  : 1960  MRN: [id number]  Referring provider: Sonia Vo PA-C  Dx:   Encounter Diagnosis     ICD-10-CM    1  Spondylolisthesis of lumbar region  M43 16           Start Time: 1115  Stop Time: 1201  Total time in clinic (min): 46 minutes    Subjective: Patient reports that his back is actually feeling pretty good today  Patient states that it's too soon to tell if the therapy is helping or not  Objective: See treatment diary below      Assessment: Patient demonstrates improved lumbar ROM and activity tolerance this session  Patient completed today's session without increase in pain  Progressed bridging exercise to single leg bridge with static hold to work on core stabilization and hip extension strength  Educated patient on avoiding forced lumbar flexion as part of his morning stretching routine  Forced flexion is reversing the gains that we are trying to make with PPU and extension based exercises  Patient expressed understanding  Future sessions should continue to progress ROM exercises as able  Tolerated treatment well  Patient would benefit from continued PT      Plan: Continue per plan of care  Precautions: DM      POC expires Auth Status Unit limit Start date  Expiration date PT/OT + Visit Limit?    23 auth- 52 visits 4 units 23 52 hard max                                         Foto             Manuals                                                              Neuro Re-Ed             TA contraction             +march             TA+SLR             Pallof press             Stir the pot             Plank HEP  2x30\" 2x30\"         Side plank HEP  2x15\" ea 2x15\" ea         Bird dog  2x10  3x10 ea         Dead bug             Swimmers             PPT                          Supine nerve gliding  2x10 ea 2x10  ea 2x10 ea         Assessment  GS           PT edu    GS       " "  Ther Ex             Kneeling hip flexor stretch HEP  3x30\" 3x30\"         Quad stretch             Bridges  2x10 2x10 2x10  SL 5\"         PPU  2x20 2x20 2x20         Supine piriformis stretch  3x30\" 3x30\" 3x30\"         [place] stretch   3x30\" 3x30\"         Open book    x15         Treadmill             Ther Activity                                       Gait Training                                       Modalities                                            "

## 2023-05-18 ENCOUNTER — OFFICE VISIT (OUTPATIENT)
Dept: PHYSICAL THERAPY | Facility: CLINIC | Age: 63
End: 2023-05-18

## 2023-05-18 DIAGNOSIS — M43.16 SPONDYLOLISTHESIS OF LUMBAR REGION: Primary | ICD-10-CM

## 2023-05-18 NOTE — PROGRESS NOTES
"Daily Note     Today's date: 2023  Patient name: Raz De La Rosa  : 1960  MRN: [id number]  Referring provider: No ref  provider found  Dx:   Encounter Diagnosis     ICD-10-CM    1  Spondylolisthesis of lumbar region  M43 16           Start Time: 1201  Stop Time: 1240  Total time in clinic (min): 39 minutes    Subjective: Patient reports that his back has been about the same  The cycle of increased pain in the morning, then biking and exercise decrease pain as day goes on  Objective: See treatment diary below      Assessment: Patient demonstrates consistent core strength and lumbar ROM this session  Patient completed today's session without increase in pain  Manual lumbar distraction attempted and resulted in decreased pain  Prone hang off table also results in decreased pain with lumbar flexion  Based on response, patient may be a good candidate for lumbar traction  Future sessions should continue to progress core stabilization exercises and add lumbar traction as able  Tolerated treatment well  Patient would benefit from continued PT      Plan: Continue per plan of care  Precautions: DM      POC expires Auth Status Unit limit Start date  Expiration date PT/OT + Visit Limit?    23 auth- 52 visits 4 units 23 52 hard max                                         Foto             Manuals         Supine DL lumbar distraction     GS                                               Neuro Re-Ed             TA contraction             +march             TA+SLR             Pallof press             Stir the pot             Plank HEP  2x30\" 2x30\"         Side plank HEP  2x15\" ea 2x15\" ea         Bird dog  2x10  3x10 ea 2x10 ea        Dead bug             Swimmers             PPT                          Supine nerve gliding  2x10 ea 2x10  ea 2x10 ea 3x10 ea        Assessment  GS           PT edu GS   GS         Ther Ex             Kneeling hip flexor stretch " "HEP  3x30\" 3x30\"         Quad stretch             Bridges  2x10 2x10 2x10  SL 5\" 2x10  SL 5\"        PPU  2x20 2x20 2x20 2x20        Supine piriformis stretch  3x30\" 3x30\" 3x30\" 3x30\"        [place] stretch   3x30\" 3x30\" 3x30\"        Open book    x15         Prone hang off table     2x1 min        Ther Activity                                       Gait Training                                       Modalities                                            "

## 2023-05-23 ENCOUNTER — OFFICE VISIT (OUTPATIENT)
Dept: PHYSICAL THERAPY | Facility: CLINIC | Age: 63
End: 2023-05-23

## 2023-05-23 DIAGNOSIS — M43.16 SPONDYLOLISTHESIS OF LUMBAR REGION: Primary | ICD-10-CM

## 2023-05-23 NOTE — PROGRESS NOTES
"Daily Note     Today's date: 2023  Patient name: Remy Reeves  : 1960  MRN: [id number]  Referring provider: No ref  provider found  Dx:   Encounter Diagnosis     ICD-10-CM    1  Spondylolisthesis of lumbar region  M43 16           Start Time:   Stop Time: 1330  Total time in clinic (min): 45 minutes    Subjective: Patient reports that his back is feeling the best it has in a while today  Objective: See treatment diary below      Assessment: Patient demonstrates improving hip flexor flexibility this session  Patient completed today's session without increase in pain  Performed mechanical lumbar traction today  Minimal effect noted from lumbar traction  Educated patient on importance of continued hip flexor stretching  Added lumbar flexion MWM at L4 in quadruped, resulted in complete abolishment of symptoms  Future sessions should continue to progress MWM and stretching exercises as able  Tolerated treatment well  Patient would benefit from continued PT      Plan: Continue per plan of care  Precautions: DM      POC expires Auth Status Unit limit Start date  Expiration date PT/OT + Visit Limit?    23 auth- 52 visits 4 units 23 52 hard max                                         Foto             Manuals        Supine DL lumbar distraction     GS        Quad L4 MWM      GS                                 Neuro Re-Ed             TA contraction             +march             TA+SLR             Pallof press             Stir the pot             Plank HEP  2x30\" 2x30\"         Side plank HEP  2x15\" ea 2x15\" ea         Bird dog  2x10  3x10 ea 2x10 ea        Dead bug             Swimmers             PPT                          Supine nerve gliding  2x10 ea 2x10  ea 2x10 ea 3x10 ea        Assessment  GS           PT edu GS   GS  GS       Ther Ex             Kneeling hip flexor stretch HEP  3x30\" 3x30\"         Modified Haresh hip flexor stretch   " "   2x30\"       Quad stretch             Bridges  2x10 2x10 2x10  SL 5\" 2x10  SL 5\"        PPU  2x20 2x20 2x20 2x20        Supine piriformis stretch  3x30\" 3x30\" 3x30\" 3x30\"        [employer] stretch   3x30\" 3x30\" 3x30\" 3x30\"       Open book    x15         Prone hang off table     2x1 min        Ther Activity                                       Gait Training                                       Modalities             Lumbar traction      70# static  10'                         "

## 2023-05-25 ENCOUNTER — OFFICE VISIT (OUTPATIENT)
Dept: PHYSICAL THERAPY | Facility: CLINIC | Age: 63
End: 2023-05-25

## 2023-05-25 DIAGNOSIS — M43.16 SPONDYLOLISTHESIS OF LUMBAR REGION: Primary | ICD-10-CM

## 2023-05-25 NOTE — PROGRESS NOTES
"Daily Note     Today's date: 2023  Patient name: Tracy Tracey  : 1960  MRN: [id number]  Referring provider: No ref  provider found  Dx:   Encounter Diagnosis     ICD-10-CM    1  Spondylolisthesis of lumbar region  M43 16           Start Time: 5740  Stop Time: 1244  Total time in clinic (min): 41 minutes    Subjective: Patient reports that he went for a bike ride this morning and his back is feeling pretty good  He states that the morning pain in his back is still there but his hip flexors are loosening up  Objective: See treatment diary below      Assessment: Patient demonstrates improving hip flexor flexibility this session  Patient completed today's session without increase in pain  RDLs added this session in order to increase strength of posterior chain  PPT tilts performed to work on motor control to avoid excessive anterior pelvic tilt  Future sessions should continue to progress posterior chain strengthening and hip flexor stretching as able  Tolerated treatment well  Patient would benefit from continued PT      Plan: Continue per plan of care  Precautions: DM      POC expires Auth Status Unit limit Start date  Expiration date PT/OT + Visit Limit?    23 auth- 52 visits 4 units 23 52 hard max                                         Foto             Manuals       Supine DL lumbar distraction     GS        Quad L4 MWM      GS       Prone hip ant mob       GS  Gr 3/                   Neuro Re-Ed             TA contraction             +march             TA+SLR             Pallof press             Stir the pot             Plank HEP  2x30\" 2x30\"         Side plank HEP  2x15\" ea 2x15\" ea         Bird dog  2x10  3x10 ea 2x10 ea        Dead bug             Swimmers             PPT       2x10                   Supine nerve gliding  2x10 ea 2x10  ea 2x10 ea 3x10 ea        Assessment  GS     GS      PT edu GS   GS  GS       Ther Ex        " "     Kneeling hip flexor stretch HEP  3x30\" 3x30\"   3x30\"      Modified Haresh hip flexor stretch      2x30\" 3x30\"      Quad stretch             Bridges  2x10 2x10 2x10  SL 5\" 2x10  SL 5\"        PPU  2x20 2x20 2x20 2x20  2x10      Supine piriformis stretch  3x30\" 3x30\" 3x30\" 3x30\"        [place] stretch   3x30\" 3x30\" 3x30\" 3x30\" 3x30\"      Open book    x15         Prone hang off table     2x1 min        Ther Activity             RDL       3x10  #15                   Gait Training                                       Modalities             Lumbar traction      70# static  10'                         "

## 2023-05-30 ENCOUNTER — APPOINTMENT (OUTPATIENT)
Dept: PHYSICAL THERAPY | Facility: CLINIC | Age: 63
End: 2023-05-30
Payer: COMMERCIAL

## 2023-06-01 ENCOUNTER — APPOINTMENT (OUTPATIENT)
Dept: PHYSICAL THERAPY | Facility: CLINIC | Age: 63
End: 2023-06-01
Payer: COMMERCIAL

## 2023-06-06 ENCOUNTER — APPOINTMENT (OUTPATIENT)
Dept: PHYSICAL THERAPY | Facility: CLINIC | Age: 63
End: 2023-06-06
Payer: COMMERCIAL

## 2023-06-08 ENCOUNTER — APPOINTMENT (OUTPATIENT)
Dept: PHYSICAL THERAPY | Facility: CLINIC | Age: 63
End: 2023-06-08
Payer: COMMERCIAL

## 2023-06-13 ENCOUNTER — APPOINTMENT (OUTPATIENT)
Dept: PHYSICAL THERAPY | Facility: CLINIC | Age: 63
End: 2023-06-13
Payer: COMMERCIAL

## 2023-06-15 ENCOUNTER — EVALUATION (OUTPATIENT)
Dept: PHYSICAL THERAPY | Facility: CLINIC | Age: 63
End: 2023-06-15
Payer: COMMERCIAL

## 2023-06-15 DIAGNOSIS — M43.16 SPONDYLOLISTHESIS OF LUMBAR REGION: Primary | ICD-10-CM

## 2023-06-15 PROCEDURE — 97110 THERAPEUTIC EXERCISES: CPT

## 2023-06-15 NOTE — LETTER
Karolyn 15, 2023    Leila Duke PA-C  [address]   [address]  [facility] [address]    Patient: Skinny Larry   YOB: 1960   Date of Visit: 6/15/2023     Encounter Diagnosis     ICD-10-CM    1  Spondylolisthesis of lumbar region  M43 16           Dear Dr Randa Prince: Thank you for your recent referral of Skinny Larry  Please review the attached evaluation summary from Peter's recent visit  Please verify that you agree with the plan of care by signing the attached order  If you have any questions or concerns, please do not hesitate to call  I sincerely appreciate the opportunity to share in the care of one of your patients and hope to have another opportunity to work with you in the near future  Sincerely,    Mary Hurtado, PT      Referring Provider:      I certify that I have read the below Plan of Care and certify the need for these services furnished under this plan of treatment while under my care  Leila Duke PA-C  [address] [address]  Via Fax: [phone]          PT Re-Evaluation     Today's date: 6/15/2023  Patient name: Skinny Larry  : 1960  MRN: [id number]  Referring provider: Angie Devine PA-C  Dx:   Encounter Diagnosis     ICD-10-CM    1  Spondylolisthesis of lumbar region  M43 16           Start Time: 1120  Stop Time: 1200  Total time in clinic (min): 40 minutes    Assessment  Assessment details: Patient is a 63year old male who presents to OP PT with chief complaints of chronic low back pain several years in duration  With PT, patient has made improvements in hip flexor flexibility, core strength and overall activity tolerance  Focus on hip flexor stretching and posterior chain strengthening has helped to decrease pain  Re-evaluation reveals limited R hip IR ROM  Remaining deficits include limited hip flexor ROM, R hip IR ROM, and activity intolerance    Functionally, they have the most difficulty with getting out of bed in the morning and static upright standing  Patient would continue to benefit from skilled PT services in order to address the stated deficits and return to PLOF  Impairments: abnormal or restricted ROM, activity intolerance, pain with function and poor posture     Symptom irritability: low  Goals  ST  Patient will be independent with HEP in 4 weeks -met  2  Patient will decrease pain by 2 points in 4 weeks -met  LT  Patient will be able to squat to lift an object off of the floor without limitation in 8 weeks  2  Patient will be able to stand for >30 minutes without limitation in 8 weeks  3  Patient will be able to sleep through the night with minimal limitation in 8 weeks  4  Patient will be able to play golf with minimal limitation in 8 weeks    Plan  Patient would benefit from: skilled physical therapy  Planned modality interventions: thermotherapy: hydrocollator packs  Planned therapy interventions: abdominal trunk stabilization, activity modification, ADL retraining, functional ROM exercises, flexibility, graded activity, graded exercise, home exercise program, therapeutic exercise, therapeutic activities, stretching, strengthening, patient education, neuromuscular re-education, motor coordination training, massage and manual therapy  Frequency: 1x week  Duration in visits: 4  Duration in weeks: 4  Plan of Care beginning date: 6/15/2023  Plan of Care expiration date: 2023  Treatment plan discussed with: patient        Subjective Evaluation    History of Present Illness  Mechanism of injury: History of Present Illness  Mechanism of injury: Patient reports to OP PT with chronic low back pain for 5 years  Foam rollers, massage balls, and exercise help  Xray in  revealed grade 1 spondylolisthesis but xray last week did not seem significant for slippage  He has not run in 10 years    It improves with movement and improves throughout the day   Mornings are the worst   Sleeping, standing, walking uphill, and walking golf course without sitting are problematic  Pain  Current pain ratin  At best pain ratin  At worst pain ratin  Location: low back     Patient Goals  Patient goals for therapy: decreased pain, independence with ADLs/IADLs and return to sport/leisure activities    6/15- Patient reports that he feels about 60% better since starting PT  Patient is still having the most difficulty with lumbar stiffness in the morning and upright standing  Pain  Current pain ratin  At best pain ratin  At worst pain rating: 3    Patient Goals  Patient goals for therapy: decreased pain and increased motion          Objective     Active Range of Motion   Left Hip   Internal rotation (90/90): 45 degrees     Right Hip   Internal rotation (90/90): 30 degrees     General Comments:      Lumbar Comments  Postural Observations     Additional Postural Observation Details  Stands with anterior pelvic tilt     Tenderness     Additional Tenderness Details  Diffuse tenderness across lumbosacral region     Active Range of Motion      Lumbar   Flexion:  WFL  Extension:  WFL  Left lateral flexion:  WFL  Right lateral flexion:  WFL  Left rotation:  WF  Right rotation:  [facility]     Tests     Additional Tests Details  + arabella test bilaterally with knee extension      Flowsheet Rows    Flowsheet Row Most Recent Value   PT/OT G-Codes    Current Score 70   Projected Score 71            Precautions: DM      POC expires Auth Status Unit limit Start date  Expiration date PT/OT + Visit Limit?    23 auth- 52 visits 4 units 23 52 hard max                                         Foto 5/5      5/25 6/15     Manuals 5/5 5/8 5/11 5/16 5/18 5/23 5/25 6/15     Supine DL lumbar distraction     GS        Quad L4 MWM      GS       Prone hip ant mob       GS  Gr 3/4      R hip IR MWM        NV     Neuro Re-Ed             TA contraction             TA+march "    TA+SLR             Pallof press             Stir the pot             Plank HEP  2x30\" 2x30\"         Side plank HEP  2x15\" ea 2x15\" ea         Bird dog  2x10  3x10 ea 2x10 ea        Dead bug             Swimmers             PPT       2x10                   Supine nerve gliding  2x10 ea 2x10  ea 2x10 ea 3x10 ea        Assessment  GS     GS      PT edu GS   GS  GS       Ther Ex             Kneeling hip flexor stretch HEP  3x30\" 3x30\"   3x30\"      Modified Haresh hip flexor stretch      2x30\" 3x30\"      Quad stretch             Bridges  2x10 2x10 2x10  SL 5\" 2x10  SL 5\"        PPU  2x20 2x20 2x20 2x20  2x10      Supine piriformis stretch  3x30\" 3x30\" 3x30\" 3x30\"        [place] stretch   3x30\" 3x30\" 3x30\" 3x30\" 3x30\" 2x30\"     Open book    x15         Prone hang off table     2x1 min        Prone PB hip ext        2x10     Super man        NV     PT re eval        GS     Ther Activity             RDL       3x10  #15      SL RDL        NV     Gait Training                                       Modalities             Lumbar traction      70# static  10'                                        "

## 2023-06-15 NOTE — PROGRESS NOTES
PT Re-Evaluation     Today's date: 6/15/2023  Patient name: Julio Blank  : 1960  MRN: [id number]  Referring provider: Kieran Cabot, PA-C  Dx:   Encounter Diagnosis     ICD-10-CM    1  Spondylolisthesis of lumbar region  M43 16           Start Time: 1120  Stop Time: 1200  Total time in clinic (min): 40 minutes    Assessment  Assessment details: Patient is a 63year old male who presents to OP PT with chief complaints of chronic low back pain several years in duration  With PT, patient has made improvements in hip flexor flexibility, core strength and overall activity tolerance  Focus on hip flexor stretching and posterior chain strengthening has helped to decrease pain  Re-evaluation reveals limited R hip IR ROM  Remaining deficits include limited hip flexor ROM, R hip IR ROM, and activity intolerance  Functionally, they have the most difficulty with getting out of bed in the morning and static upright standing  Patient would continue to benefit from skilled PT services in order to address the stated deficits and return to PLOF  Impairments: abnormal or restricted ROM, activity intolerance, pain with function and poor posture     Symptom irritability: low  Goals  ST  Patient will be independent with HEP in 4 weeks -met  2  Patient will decrease pain by 2 points in 4 weeks -met  LT  Patient will be able to squat to lift an object off of the floor without limitation in 8 weeks  2  Patient will be able to stand for >30 minutes without limitation in 8 weeks  3  Patient will be able to sleep through the night with minimal limitation in 8 weeks  4   Patient will be able to play golf with minimal limitation in 8 weeks    Plan  Patient would benefit from: skilled physical therapy  Planned modality interventions: thermotherapy: hydrocollator packs  Planned therapy interventions: abdominal trunk stabilization, activity modification, ADL retraining, functional ROM exercises, flexibility, graded activity, graded exercise, home exercise program, therapeutic exercise, therapeutic activities, stretching, strengthening, patient education, neuromuscular re-education, motor coordination training, massage and manual therapy  Frequency: 1x week  Duration in visits: 4  Duration in weeks: 4  Plan of Care beginning date: 6/15/2023  Plan of Care expiration date: 2023  Treatment plan discussed with: patient        Subjective Evaluation    History of Present Illness  Mechanism of injury: History of Present Illness  Mechanism of injury: Patient reports to OP PT with chronic low back pain for 5 years  Foam rollers, massage balls, and exercise help  Xray in  revealed grade 1 spondylolisthesis but xray last week did not seem significant for slippage  He has not run in 10 years  It improves with movement and improves throughout the day  Mornings are the worst   Sleeping, standing, walking uphill, and walking golf course without sitting are problematic  Pain  Current pain ratin  At best pain ratin  At worst pain ratin  Location: low back     Patient Goals  Patient goals for therapy: decreased pain, independence with ADLs/IADLs and return to sport/leisure activities    6/15- Patient reports that he feels about 60% better since starting PT  Patient is still having the most difficulty with lumbar stiffness in the morning and upright standing    Pain  Current pain ratin  At best pain ratin  At worst pain rating: 3    Patient Goals  Patient goals for therapy: decreased pain and increased motion          Objective     Active Range of Motion   Left Hip   Internal rotation (90/90): 45 degrees     Right Hip   Internal rotation (90/90): 30 degrees     General Comments:      Lumbar Comments  Postural Observations     Additional Postural Observation Details  Stands with anterior pelvic tilt     Tenderness     Additional Tenderness Details  Diffuse tenderness across lumbosacral region     Active Range of "Motion      Lumbar   Flexion:  WFL  Extension:  WFL  Left lateral flexion:  WFL  Right lateral flexion:  WFL  Left rotation:  WFL  Right rotation:  [facility]     Tests     Additional Tests Details  + haresh test bilaterally with knee extension      Flowsheet Rows    Flowsheet Row Most Recent Value   PT/OT G-Codes    Current Score 70   Projected Score 71             Precautions: DM      POC expires Auth Status Unit limit Start date  Expiration date PT/OT + Visit Limit?    6/30/23 auth- 52 visits 4 units 5/5/23 12/3123 52 hard max                                         Foto 5/5      5/25 6/15     Manuals 5/5 5/8 5/11 5/16 5/18 5/23 5/25 6/15     Supine DL lumbar distraction     GS        Quad L4 MWM      GS       Prone hip ant mob       GS  Gr 3/4      R hip IR MWM        NV     Neuro Re-Ed             TA contraction             TA+march             TA+SLR             Pallof press             Stir the pot             Plank HEP  2x30\" 2x30\"         Side plank HEP  2x15\" ea 2x15\" ea         Bird dog  2x10  3x10 ea 2x10 ea        Dead bug             Swimmers             PPT       2x10                   Supine nerve gliding  2x10 ea 2x10  ea 2x10 ea 3x10 ea        Assessment  GS     GS      PT edu GS   GS  GS       Ther Ex             Kneeling hip flexor stretch HEP  3x30\" 3x30\"   3x30\"      Modified Haresh hip flexor stretch      2x30\" 3x30\"      Quad stretch             Bridges  2x10 2x10 2x10  SL 5\" 2x10  SL 5\"        PPU  2x20 2x20 2x20 2x20  2x10      Supine piriformis stretch  3x30\" 3x30\" 3x30\" 3x30\"        [place] stretch   3x30\" 3x30\" 3x30\" 3x30\" 3x30\" 2x30\"     Open book    x15         Prone hang off table     2x1 min        Prone PB hip ext        2x10     Super man        NV     PT re eval        GS     Ther Activity             RDL       3x10  #15      SL RDL        NV     Gait Training                                       Modalities             Lumbar traction      70# static  10'                         "

## 2023-06-20 ENCOUNTER — OFFICE VISIT (OUTPATIENT)
Dept: PHYSICAL THERAPY | Facility: CLINIC | Age: 63
End: 2023-06-20
Payer: COMMERCIAL

## 2023-06-20 DIAGNOSIS — M43.16 SPONDYLOLISTHESIS OF LUMBAR REGION: Primary | ICD-10-CM

## 2023-06-20 PROCEDURE — 97110 THERAPEUTIC EXERCISES: CPT

## 2023-06-20 PROCEDURE — 97530 THERAPEUTIC ACTIVITIES: CPT

## 2023-06-20 PROCEDURE — 97140 MANUAL THERAPY 1/> REGIONS: CPT

## 2023-06-20 NOTE — PROGRESS NOTES
"Daily Note     Today's date: 2023  Patient name: J Luis Oneill  : 1960  MRN: [id number]  Referring provider: No ref  provider found  Dx:   Encounter Diagnosis     ICD-10-CM    1  Spondylolisthesis of lumbar region  M43 16           Start Time: 1120  Stop Time: 1200  Total time in clinic (min): 40 minutes    Subjective: Patient reports that he is feeling good as he just came from a bike ride  Objective: See treatment diary below      Assessment: Patient demonstrates improving posterior chain strength this session  Patient completed today's session without increase in pain  Focused on hip mobilization and posterior chain strengthening  Added WB hip extension MWM to achieve full hip extension ROM during terminal stance  R Hip IR MWM performed but did not no result in improved ROM  Instructed patient on SL RDL and encouraged him to perform with HEP  Future sessions should continue to progress stretching and strengthening exercises as able  Tolerated treatment well  Patient would benefit from continued PT      Plan: Continue per plan of care  Precautions: DM      POC expires Auth Status Unit limit Start date  Expiration date PT/OT + Visit Limit?    23 auth- 52 visits 4 units 23 52 hard max                                         Foto 5/5      5/25 6/15     Manuals 5/5 5/8 5/11 5/16 5/18 5/23 5/25 6/15 6/20    Supine DL lumbar distraction     GS        Quad L4 MWM      GS       Prone hip ant mob       GS  Gr 3/4  GS    R hip IR MWM        NV GS    WB hip ext MWM         GS    Neuro Re-Ed             TA contraction             +march             TA+SLR             Pallof press             Stir the pot             Plank HEP  2x30\" 2x30\"         Side plank HEP  2x15\" ea 2x15\" ea         Bird dog  2x10  3x10 ea 2x10 ea        Dead bug             Swimmers             PPT       2x10                   Supine nerve gliding  2x10 ea 2x10  ea 2x10 ea 3x10 ea        Assessment  GS   " "  GS      PT edu GS   GS  GS       Ther Ex             Kneeling hip flexor stretch HEP  3x30\" 3x30\"   3x30\"  3x30\"    Modified Haresh hip flexor stretch      2x30\" 3x30\"      Quad stretch             Bridges  2x10 2x10 2x10  SL 5\" 2x10  SL 5\"        PPU  2x20 2x20 2x20 2x20  2x10      Supine piriformis stretch  3x30\" 3x30\" 3x30\" 3x30\"        [place] stretch   3x30\" 3x30\" 3x30\" 3x30\" 3x30\" 2x30\" 3x30\"    Open book    x15         Prone hang off table     2x1 min        Prone PB hip ext        2x10 2x10    Super man        NV     PT re eval        GS     Ther Activity             RDL       3x10  #15  2x10  15#    SL RDL        NV x10 ea    Squats         x10  25#   KB    Gait Training                                       Modalities             Lumbar traction      70# static  10'                         "

## 2023-06-22 ENCOUNTER — APPOINTMENT (OUTPATIENT)
Dept: PHYSICAL THERAPY | Facility: CLINIC | Age: 63
End: 2023-06-22
Payer: COMMERCIAL

## 2023-06-27 ENCOUNTER — APPOINTMENT (OUTPATIENT)
Dept: PHYSICAL THERAPY | Facility: CLINIC | Age: 63
End: 2023-06-27
Payer: COMMERCIAL

## 2023-06-29 ENCOUNTER — OFFICE VISIT (OUTPATIENT)
Dept: PHYSICAL THERAPY | Facility: CLINIC | Age: 63
End: 2023-06-29
Payer: COMMERCIAL

## 2023-06-29 DIAGNOSIS — M43.16 SPONDYLOLISTHESIS OF LUMBAR REGION: Primary | ICD-10-CM

## 2023-06-29 PROCEDURE — 97110 THERAPEUTIC EXERCISES: CPT

## 2023-06-29 PROCEDURE — 97530 THERAPEUTIC ACTIVITIES: CPT

## 2023-06-29 NOTE — PROGRESS NOTES
"Daily Note     Today's date: 2023  Patient name: Jenna Williamson  : 1960  MRN: [id number]  Referring provider: No ref  provider found  Dx:   Encounter Diagnosis     ICD-10-CM    1  Spondylolisthesis of lumbar region  M43 16           Start Time: 1125  Stop Time: 1158  Total time in clinic (min): 33 minutes    Subjective: Patient reports that he received the cortisone injection the other day and his back is feeling really good  Objective: See treatment diary below      Assessment: Patient demonstrates improving overall activity tolerance this session  Patient completed today's session without pain  Patient is doing well since receiving cortisone injection and no longer presents with functional deficits  One more follow up appointment scheduled for next week to ensure continued progress and develop HEP  Tolerated treatment well  Patient would benefit from continued PT        Plan: Continue per plan of care  Precautions: DM      POC expires Auth Status Unit limit Start date  Expiration date PT/OT + Visit Limit?    23 auth- 52 visits 4 units 23 52 hard max                                         Foto 5/5      5/25 6/15     Manuals 5/5 5/8 5/11 5/16 5/18 5/23 5/25 6/15 6/20 6/29   Supine DL lumbar distraction     GS        Quad L4 MWM      GS       Prone hip ant mob       GS  Gr 3/  GS    R hip IR MWM        NV GS    WB hip ext MWM         GS    Neuro Re-Ed             TA contraction             +march             TA+SLR             Pallof press             Stir the pot             Plank HEP  2x30\" 2x30\"         Side plank HEP  2x15\" ea 2x15\" ea         Bird dog  2x10  3x10 ea 2x10 ea     2x10   Dead bug             Swimmers             PPT       2x10                   Supine nerve gliding  2x10 ea 2x10  ea 2x10 ea 3x10 ea        Assessment  GS     GS      PT edu GS   GS  GS       Ther Ex             Kneeling hip flexor stretch HEP  3x30\" 3x30\"   3x30\"  3x30\" 3x30\"   Modified " "Haresh hip flexor stretch      2x30\" 3x30\"      Quad stretch             Bridges  2x10 2x10 2x10  SL 5\" 2x10  SL 5\"        PPU  2x20 2x20 2x20 2x20  2x10   2x10   Supine piriformis stretch  3x30\" 3x30\" 3x30\" 3x30\"        [place] stretch   3x30\" 3x30\" 3x30\" 3x30\" 3x30\" 2x30\" 3x30\" 3x30\"   Open book    x15         Prone hang off table     2x1 min        Prone PB hip ext        2x10 2x10 2x10   Super man        NV     PT re eval        GS     Ther Activity             RDL       3x10  #15  2x10  15#    SL RDL        NV x10 ea x5 ea   Squats         x10  25#   KB x10  25#   Gait Training                                       Modalities             Lumbar traction      70# static  10'                         "

## 2023-10-04 ENCOUNTER — OFFICE VISIT (OUTPATIENT)
Dept: DERMATOLOGY | Facility: CLINIC | Age: 63
End: 2023-10-04
Payer: COMMERCIAL

## 2023-10-04 VITALS — WEIGHT: 145 LBS | BODY MASS INDEX: 21.98 KG/M2 | HEIGHT: 68 IN | TEMPERATURE: 98 F

## 2023-10-04 DIAGNOSIS — R21 RASH: Primary | ICD-10-CM

## 2023-10-04 DIAGNOSIS — L82.1 SEBORRHEIC KERATOSIS: ICD-10-CM

## 2023-10-04 PROCEDURE — 99203 OFFICE O/P NEW LOW 30 MIN: CPT | Performed by: DERMATOLOGY

## 2023-10-04 RX ORDER — TAMSULOSIN HYDROCHLORIDE 0.4 MG/1
CAPSULE ORAL
COMMUNITY
Start: 2023-09-11

## 2023-10-04 RX ORDER — CYCLOSPORINE 0.5 MG/ML
EMULSION OPHTHALMIC
COMMUNITY

## 2023-10-04 RX ORDER — CLOBETASOL PROPIONATE 0.5 MG/G
CREAM TOPICAL 2 TIMES DAILY
COMMUNITY
Start: 2023-04-21

## 2023-10-04 RX ORDER — KETOCONAZOLE 20 MG/ML
SHAMPOO TOPICAL
COMMUNITY

## 2023-10-04 RX ORDER — SOLIFENACIN SUCCINATE 10 MG/1
10 TABLET, FILM COATED ORAL DAILY
COMMUNITY
Start: 2023-08-16

## 2023-10-04 RX ORDER — FLUTICASONE PROPIONATE 50 MCG
2 SPRAY, SUSPENSION (ML) NASAL DAILY
COMMUNITY
Start: 2023-06-07
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
## 2023-10-04 NOTE — PATIENT INSTRUCTIONS
RASH        Assessment and Plan:  Based on a thorough discussion of this condition and the management approach to it (including a comprehensive discussion of the known risks, side effects and potential benefits of treatment), the patient (family) agrees to implement the following specific plan:  Patient advised to contact Dr Vince Patel via My Chart and will try to get him in for an appt to evaluate rash. SEBORRHEIC KERATOSIS; NON-INFLAMED        Assessment and Plan:  Based on a thorough discussion of this condition and the management approach to it (including a comprehensive discussion of the known risks, side effects and potential benefits of treatment), the patient (family) agrees to implement the following specific plan:  Reassured benign     Seborrheic Keratosis  A seborrheic keratosis is a harmless warty spot that appears during adult life as a common sign of skin aging. Seborrheic keratoses can arise on any area of skin, covered or uncovered, with the usual exception of the palms and soles. They do not arise from mucous membranes. Seborrheic keratoses can have highly variable appearance. Seborrheic keratoses are extremely common. It has been estimated that over 90% of adults over the age of 61 years have one or more of them. They occur in males and females of all races, typically beginning to erupt in the 35s or 45s. They are uncommon under the age of 21 years. The precise cause of seborrhoeic keratoses is not known. Seborrhoeic keratoses are considered degenerative in nature. As time goes by, seborrheic keratoses tend to become more numerous. Some people inherit a tendency to develop a very large number of them; some people may have hundreds of them. The name "seborrheic keratosis" is misleading, because these lesions are not limited to a seborrhoeic distribution (scalp, mid-face, chest, upper back), nor are they formed from sebaceous glands, nor are they associated with sebum -- which is greasy. Seborrheic keratosis may also be called "SK," "Seb K," "basal cell papilloma," "senile wart," or "barnacle."      Researchers have noted:  Eruptive seborrhoeic keratoses can follow sunburn or dermatitis  Skin friction may be the reason they appear in body folds  Viral cause (e.g., human papillomavirus) seems unlikely  Stable and clonal mutations or activation of FRFR3, PIK3CA, GENEVA, AKT1 and EGFR genes are found in seborrhoeic keratoses  Seborrhoeic keratosis can arise from solar lentigo  FRFR3 mutations also arise in solar lentigines. These mutations are associated with increased age and location on the head and neck, suggesting a role of ultraviolet radiation in these lesions  Seborrheic keratoses do not harbour tumour suppressor gene mutations  Epidermal growth factor receptor inhibitors, which are used to treat some cancers, often result in an increase in verrucal (warty) keratoses. There is no easy way to remove multiple lesions on a single occasion. Unless a specific lesion is "inflamed" and is causing pain or stinging/burning or is bleeding, most insurance companies do not authorize treatment.

## 2023-10-17 ENCOUNTER — TELEPHONE (OUTPATIENT)
Age: 63
End: 2023-10-17

## 2023-11-12 ENCOUNTER — PATIENT MESSAGE (OUTPATIENT)
Dept: DERMATOLOGY | Facility: CLINIC | Age: 63
End: 2023-11-12

## 2023-11-13 NOTE — PATIENT COMMUNICATION
Left a voicemail , requesting that the patient call back ASAP to get scheduled for an appointment (rash). Appointment is for tomorrow with Dr. Heather Bañuelos at 9:40. I explained to the patient that this appointment is not guaranteed to be there when he calls back. And to call back at his earliest convenience.

## 2023-11-14 ENCOUNTER — OFFICE VISIT (OUTPATIENT)
Dept: DERMATOLOGY | Facility: CLINIC | Age: 63
End: 2023-11-14
Payer: COMMERCIAL

## 2023-11-14 DIAGNOSIS — L20.9 ATOPIC DERMATITIS IN ADULT: Primary | ICD-10-CM

## 2023-11-14 PROCEDURE — 99214 OFFICE O/P EST MOD 30 MIN: CPT | Performed by: DERMATOLOGY

## 2023-11-14 RX ORDER — CLOBETASOL PROPIONATE 0.5 MG/G
CREAM TOPICAL 2 TIMES DAILY
Qty: 60 G | Refills: 1 | Status: SHIPPED | OUTPATIENT
Start: 2023-11-14
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
## 2023-11-14 NOTE — PATIENT INSTRUCTIONS
ATOPIC DERMATITIS ("childhood Eczema")    Assessment and Plan:  Based on a thorough discussion of this condition and the management approach to it (including a comprehensive discussion of the known risks, side effects and potential benefits of treatment), the patient (family) agrees to implement the following specific plan:  - Recommend sensitive skin care regimen  - detergent free of dyes and perfumes (example, free and clear). Try washing clothes with extra rinse cycle. - Short lukewarm showers  - White dove bar soap  - Recommend moisturizing whole body with Creams multiple times a day (examples: Cetaphil, CeraVe. and Eucerin)  - avoid aerosols and fragrances in the home (candles, plug ins, perfume, air freshener, etc)  Recommend applying clobetasol  0.05% cream twice a day for active spots  Recommend over the counter antihistamine like zyrtec      Assessment and Plan:   Atopic Dermatitis is a chronic, itchy skin condition that is very common in children but may occur at any age. It is also known as “eczema” or “atopic eczema.” It is the most common form of dermatitis. Atopic dermatitis usually occurs in people who have an “atopic tendency.”  This means they may develop any or all of these closely linked conditions: Atopic dermatitis, asthma, hay fever (allergic rhinitis), eosinophilic esophagitis, and gastroenteritis. Often these conditions run within families with a parent, child or sibling also affected. A family history of asthma, eczema or hay fever is particularly useful in diagnosing atopic dermatitis in infants. Atopic dermatitis arises because of a complex interaction of genetic and environmental factors. These include defects in skin barrier function making the skin more susceptible to irritation by soap and other contact irritants, the weather, temperature and non-specific triggers. There is also an element of immune system dysregulation that is often present.   By definition, it is chronic and has a "waxing-waning" nature; flares should be expected but with good education and treatment strategies can be minimized. Some specific tips we discussed:  Dry skin care. Using only mild cleansers (hypoallergenic and without fragrances) and fragrance free detergent (not “unscented” products which contain a masking agent); we discussed avoiding irritants/fragranced products. The importance of regular application of moisturizers daily (at least 3 times a day)  The known and theoretical side effects of steroids at length, including but not limited to atrophy of skin and increased pressure in eye (glaucoma) and clouding of the eye's lens (cataracts) if used in or around the eye for extended durations. The specific over-the-counter interventions and medications. Side effects, risks and benefits of topical and oral medications discussed. After lengthy discussion of etiology and treatment options, we decided to implement the following personalized treatment plan:      EDUCATION AS INTERVENTION! WHAT IS ATOPIC DERMATITIS? Atopic dermatitis (also called “eczema”) is a condition of the skin where the skin is dry, red, and itchy. The main function of the skin is to provide a barrier from the environment and is also the first defense of the immune system. In atopic dermatitis the skin barrier is decreased or disrupted, and the skin is easily irritated. As a result, moisture escapes the skin more easily, and environmental allergens and microbes can enter the skin more easily. Consequently, the skin's immune system is altered. If there are increased allergic type cells in the skin, the skin may become red and “hyper-excitable.” This leads to itching and a subsequent rash. WHY DO PEOPLE GET ATOPIC DERMATITIS? There is no single answer because many factors are involved. It is likely a combination of genetic makeup and environmental triggers and/or exposures.  Excessive drying or sweating of the skin, Irritating soaps, dust mites, and pet dander are some of the more common triggers. There is no blood test that can be done to confirm this diagnosis. The history and appearance of the skin is usually sufficient for a diagnosis. However, in some cases if the rash does not fit with the history or respond appropriately to treatment, a skin biopsy may be helpful. Many children do outgrow atopic dermatitis or get better; however, many continue to have sensitive skin into adulthood. Asthma and hay fever are often seen in many patients with atopic dermatitis; however, asthma flares do not necessarily occur at the same time as skin flares. PREVENTING FLARES OF ATOPIC DERMATITIS  The first step is to maintain the skin's barrier function. Keep the skin well moisturized. Avoid irritants and triggers. Use prescribed medicine when there are red or rough areas to help the skin to return to normal as quickly as possible. Try to limit scratching. If you keep the skin well moisturized, and avoid coming in contact with things you know irritate your child's skin, there will be less flares. However, some flares of atopic dermatitis are beyond your control. You should work with your health care provider to come up with a plan that minimizes flares while minimizing long term use of medications that suppress the immune system. WHAT ARE SOME OF THE TRIGGERS? Triggers are different for different people. The most common triggers are:  Heat and sweat for some individuals, cold weather for others. House dust mites, pet fur. Wool; synthetic fabrics like nylon; dyed fabrics. Tobacco smoke   Fragrances in: shampoos, soaps, lotions, laundry detergents, fabric softeners. Saliva or prolonged exposure to water. WHAT ABOUT FOOD ALLERGIES? This is a very controversial topic, as many believe that food allergies are responsible for skin flares.  In some cases, specific foods may cause worsening of atopic dermatitis; however this occurs in a minority of cases and usually happens within a few hours of ingestion. While food allergy is more common in children with eczema, foods are specific triggers for flares in only a small percentage of children. If you notice that the skin flares after certain foods you can see if eliminating one food at time makes a difference, as long as your child can still enjoy a well-balanced diet. There are blood (RAST) and skin (PRICK) tests that can check for allergies, but they are often positive in children who are not truly allergic. Therefore it is important that you work with your allergist and dermatologist to determine which foods are relevant and causing true symptoms. Extreme food elimination diets without the guidance of your doctor, which have become more popular in recent years, may even result in worsening of the skin rash due to malnutrition and avoidance of essential nutrients. TREATMENT  Treatments are aimed at minimizing exposure to irritating factors and decreasing  the skin inflammation which results in an itchy rash. There are many different treatment options, which depend on your child's rash, its location, and severity. Topical treatments include corticosteroids and steroid-like creams such as Protopic, Elidel, and Eucrisa, which are believed to not thin the skin. Please read the discussions below regarding risks and benefits of all of these creams. Occasionally bacterial or viral infections can occur which flare the skin and require oral and/or topical antibiotics or antivirals. In some cases bleach baths 2-3 times weekly can be helpful to prevent recurrent infection. For severe disease, strong oral medications such as corticosteroids, methotrexate or azathioprine (Imuran) may be needed. These medications require close monitoring and follow-up.  You should discuss the risks/ benefits/alternatives of these medications with your health care provider to come up with the best treatment plan for your child. 1) Use moisturizer all over the entire body at least THREE TIMES a day. This keeps the skin moisturized to restore the barrier function. Find a cream or ointment that your child likes - this is the most important. The medicines do not work in the bottle. The thicker the moisturizer, generally the better barrier it provides. Ointments often moisturize better than creams; and creams work better than lotions. Lotions are more useful during the summer when thick greasy ointments are uncomfortable. If you put moisturizer on the skin after bathing, while the skin is damp, it is twice as effective. The moisturizer provides a seal holding the water in the skin. You may bathe your child in warm - not hot - water, for short periods of time (no more than 5-10 minutes at a time) once a day if they like. Lightly pat your child dry with a towel and, while the skin is still damp, (within 3 minutes) apply a moisturizer from head to toe. If your child is using a medicated cream, apply it and allow it to absorb completely BEFORE you apply the moisturizer. 2) Apply the prescription medication TWICE A DAY to only the red[place] [address] [address]  Put the medication on your fingers and gently rub it into the areas. Usually the medicine will help an area within a few days time. Try to put the medicine on for two days after you have noticed that the redness is no longer present; this will help the redness from returning. The severity of the rash and the strength and usage of the medication will determine how quickly you see improvement. It is important that you do not overuse steroid creams, and if you notice a thin, shiny appearance to the skin or broken blood vessels, you should stop using the cream and consult your health care provider regarding possible overuse/overthinning of the skin.   The face, armpits and groin have particularly thin and sensitive skin and are therefore most at risk for bad results if steroids are over-used in these sites. 3) Avoid triggers. Some children have specific things that trigger itching and rashes, while others may have none that can be identified. It may require a little bit of trial and error to see what applies to your child. Also, triggers can change over time for your child. The most common triggers are listed above; start with these. Avoid the use of fabric softeners in the washing machine or dryer sheets (unless they are fragrance-free). Try to use laundry detergents, soaps and shampoos that are fragrance-free. You may find it helpful to double-rinse your clothes. Some children are sensitive to house dust mites and they may benefit from a plastic mattress wrap. While food allergy is more common in children with eczema, foods are specific triggers for flares in only a small percentage of children. If you notice that the skin flares after certain foods you can see if eliminating one food at time makes a difference, as long as your child can still enjoy a well-balanced diet. 4) Consider using a medication like an anti-histamine by mouth to help control the itching. Scratching only makes the skin more reactive and the barrier function even more disrupted. It can cause both children and their parents to lose sleep! There are different types of anti-itch medications. Some cause more drowsiness than others. Both types are acceptable depending on your child and your preference. Start with Benadryl and if that does not work, ask for a prescription “antihistamine.”    5) About the prescription creams:  Corticosteroid creams and ointments (generally things with "-one" or "yonas" on the end of their names): The strength of the cream or ointment depends on the name of the active ingredient. The numbers at the end do not indicate the relative strength.   Thus triamcinolone 0.1% ointment, considered a mid-strength corticosteroid, is much stronger than hydrocortisone 1% even though the number following the name is much lower. Topical corticosteroids are very effective in treating atopic dermatitis. When used in the manner prescribed (to rashy areas of skin and for no more than a few weeks at a time to any one area) they are very safe. These are corticosteroids and are anti-inflammatory, not the “anabolic steroids” like those used illegally by some athletes. Topical non-steroid creams and ointments (immunomodulators): These creams and ointments are also called topical calcineurin inhibitors (TCIs). These include Protopic ointment and Elidel cream. Crisaborole 2% Jandebra Bridges) is a prescription ointment that targets an enzyme called PDE4 (phosphodiesterase 4). It is used on the skin topically to treat mild-to-moderate eczema in adults and children 3years of age and older. In total, these nonsteroidal prescriptions are used to help decrease itching and redness in the skin. They are not as strong as most steroid creams; however, it is believed that they do not thin the skin when overused. They are generally used as second-line medications, though they may be used alone or in conjunction with topical steroids. In sensitive areas such as the face, underarms or groin, they are often recommended. They can sting inflamed skin, but are generally well tolerated once the skin is healing. The FDA placed a “black-box” warning on both Elidel and Protopic in 2006 based on animal studies using the medications. Some animals developed skin cancer and lymphoma. Subsequently, the FDA released a statement that there is no causal relationship between the two medications and cancer. Because of this concern, there are ongoing studies to evaluate this relationship in humans. So far, there are studies that support the safety of these medications.   One showed that the rates of cancer in patient using these medications topically were less than the rates of the general population and another showed that in patient's using the medication over a large area of the body, the levels of the medication in the blood was undetectable. As for Eucrisa, this product is only approved for the topical treatment of mild-to-moderate eczema in patients 3years of age and older; use of the medication in kids younger than 2 is considered “off label” and has not been formally studied. Burning and stinging are the most commonly reported side effects of this medication. Rarely, this product has been known to cause hives and hypersensitivity reactions; discontinue its use if you develop severe itching, swelling, or redness in the area of application.

## 2023-11-29 ENCOUNTER — OFFICE VISIT (OUTPATIENT)
Dept: DERMATOLOGY | Facility: CLINIC | Age: 63
End: 2023-11-29
Payer: COMMERCIAL

## 2023-11-29 VITALS — TEMPERATURE: 98.3 F | BODY MASS INDEX: 21.98 KG/M2 | WEIGHT: 145 LBS | HEIGHT: 68 IN

## 2023-11-29 DIAGNOSIS — R21 RASH: Primary | ICD-10-CM

## 2023-11-29 PROCEDURE — 99214 OFFICE O/P EST MOD 30 MIN: CPT | Performed by: DERMATOLOGY

## 2023-11-29 RX ORDER — HYDROXYZINE HYDROCHLORIDE 25 MG/1
25 TABLET, FILM COATED ORAL
Qty: 30 TABLET | Refills: 0 | Status: SHIPPED | OUTPATIENT
Start: 2023-11-29

## 2023-11-29 RX ORDER — PREDNISONE 10 MG/1
TABLET ORAL
Qty: 40 TABLET | Refills: 0 | Status: SHIPPED | OUTPATIENT
Start: 2023-11-29 | End: 2023-12-15

## 2023-11-29 NOTE — PROGRESS NOTES
[facility] Note     Patient Name: Tracy Rebolledo  Encounter Date: 11/29/2023  Have you been cared for by a [facility] in the last 3 years and, if so, which description applies to you? Yes. I have been here within the last 3 years, and my medical history has NOT changed since that time. I am MALE/not capable of bearing children. REVIEW OF SYSTEMS:  Have you recently had or currently have any of the following? No changes in my recent health. PAST MEDICAL HISTORY:  Have you personally ever had or currently have any of the following? If "YES," then please provide more detail. No changes in my medical history. HISTORY OF IMMUNOSUPPRESSION: Do you have a history of any of the following:  Systemic Immunosuppression such as Diabetes, Biologic or Immunotherapy, Chemotherapy, Organ Transplantation, Bone Marrow Transplantation? No     Answering "YES" requires the addition of the dotphrase "IMMUNOSUPPRESSED" as the first diagnosis of the patient's visit. FAMILY HISTORY:  Any "first degree relatives" (parent, brother, sister, or child) with the following? No changes in my family's known health. PATIENT EXPERIENCE:    Do you want the Dermatologist to perform a COMPLETE skin exam today including a clinical examination under the "bra and underwear" areas? NO  If necessary, do we have your permission to call and leave a detailed message on your Preferred Phone number that includes your specific medical information?   Yes      Allergies   Allergen Reactions    Cefixime Other (See Comments)    Ciprofloxacin GI Intolerance    Clarithromycin GI Intolerance    Levofloxacin GI Intolerance    Oxycodone GI Intolerance    Morphine Rash      Current Outpatient Medications:     Ascorbic Acid, Vitamin C, (VITAMIN C) 100 MG tablet, Vitamin C, Disp: , Rfl:     clobetasol (TEMOVATE) 0.05 % cream, Apply topically 2 (two) times a day, Disp: 60 g, Rfl: 1    cycloSPORINE (Restasis) 0.05 % ophthalmic emulsion, Restasis 0.05 % eye drops in a dropperette  INSTILL 1 DROP INTO BOTH EYES TWICE A DAY, Disp: , Rfl:     fluticasone (FLONASE) 50 mcg/act nasal spray, 2 sprays into each nostril daily, Disp: , Rfl:     Multiple Vitamin (MULTIVITAMIN ADULT PO), multivitamin, Disp: , Rfl:     solifenacin (VESICARE) 10 MG tablet, Take 10 mg by mouth daily, Disp: , Rfl:     tamsulosin (FLOMAX) 0.4 mg, TAKE 1 CAPSULE BY MOUTH EVERY DAY AT NIGHT, Disp: , Rfl:     ketoconazole (NIZORAL) 2 % shampoo, ketoconazole 2 % shampoo  SHAMPOO HAIR  2-3 TIMES A WEEK (Patient not taking: Reported on 11/29/2023), Disp: , Rfl:     mupirocin (BACTROBAN) 2 % ointment, mupirocin 2 % topical ointment  APPLY INTRANASALLY TWICE A DAY  as directed (Patient not taking: Reported on 10/4/2023), Disp: , Rfl:           Whom besides the patient is providing clinical information about today's encounter? NO ADDITIONAL HISTORIAN (patient alone provided history)    Physical Exam and Assessment/Plan by Diagnosis:    Patient here to follow up on rash. RASH    Physical Exam:  (Anatomic Location); (Size and Morphological Description); (Differential Diagnosis):  Trunk and hands; pink papules and vesicles; diffdx; viral exanthem  versus eczema   Pertinent Positives:  Pertinent Negatives: Additional History of Present Condition:  Patient here for rash that he has been treating with clobetasol ointment and is not helping, patient states rash is very itchy the past 4 days and he has been losing sleep. Patient also take zyrtec daily and is not helping. Patient states rash is worst in the winter then summer.      Assessment and Plan:  Based on a thorough discussion of this condition and the management approach to it (including a comprehensive discussion of the known risks, side effects and potential benefits of treatment), the patient (family) agrees to implement the following specific plan:  Start Prednisone taper take 40 mg  for 4 days, then 30 mg for 4 days, then 20 mg for 4 days, then 10 mg for 4 days. Take hydroxyzine 25 mg daily at bedtime, can make you sleepy if you wake up and still feel sleepy break pill in half. Can stop zyrtec if not helping.        Scribe Attestation      I,:  Alice Smith MA am acting as a scribe while in the presence of the attending physician.:       I,:  John Henry MD personally performed the services described in this documentation    as scribed in my presence.:

## 2023-11-29 NOTE — PATIENT INSTRUCTIONS
Assessment and Plan:  Based on a thorough discussion of this condition and the management approach to it (including a comprehensive discussion of the known risks, side effects and potential benefits of treatment), the patient (family) agrees to implement the following specific plan:  Start Prednisone taper take 40 mg  for 4 days, then 30 mg  for 4 days, then 20 mg for 4 days, then 10 mg for 4 days. Take hydroxyzine 25 mg daily at bedtime, can make you sleepy if you wake up and still feel sleepy break pill in half. Can stop zyrtec if not helping.

## 2023-12-01 ENCOUNTER — PATIENT MESSAGE (OUTPATIENT)
Dept: DERMATOLOGY | Facility: CLINIC | Age: 63
End: 2023-12-01

## 2023-12-01 DIAGNOSIS — L20.9 ATOPIC DERMATITIS IN ADULT: Primary | ICD-10-CM

## 2023-12-18 ENCOUNTER — TELEPHONE (OUTPATIENT)
Dept: DERMATOLOGY | Facility: CLINIC | Age: 63
End: 2023-12-18

## 2023-12-19 NOTE — TELEPHONE ENCOUNTER
new pa, dupixent, atopic dermatitis     Atopic dermatitis: SUBQ:  Initial: 600 mg (given as two 300 mg injections).  Maintenance: 300 mg once every other week.

## 2023-12-19 NOTE — TELEPHONE ENCOUNTER
----- Message from Edmond Ulrich sent at 12/18/2023  9:08 PM EST -----  Regarding: update  Contact: [phone]  Yes. Please.  Any suggestions in the meantime?

## 2023-12-19 NOTE — TELEPHONE ENCOUNTER
Prior Authorization for Dupixent 300mg pen was [id number]

## 2023-12-20 RX ORDER — PREDNISONE 10 MG/1
TABLET ORAL DAILY
Qty: 70 TABLET | Refills: 0 | Status: SHIPPED | OUTPATIENT
Start: 2023-12-20 | End: 2024-01-17

## 2023-12-29 NOTE — TELEPHONE ENCOUNTER
Followed up with prior authorization for Dupixent 300mg pen and there was no determination at this time. Form was completed and faxes to insurance for urgent review. Form was faxed to insurance and marked as urgent.

## 2023-12-29 NOTE — TELEPHONE ENCOUNTER
Ze from [facility] called to check on status of prior auth.  I informed her per last note new PA was submitted and we are waiting for determination and it was marked as urgent for insurance

## 2024-01-12 ENCOUNTER — PATIENT MESSAGE (OUTPATIENT)
Age: 64
End: 2024-01-12

## 2024-01-12 NOTE — PATIENT COMMUNICATION
Patient called with questions about his dupixent medication and approval. Also had lots of clinical questions of his diagnosis and is interested in doing phototherapy or patch testing as well so he has the time since he will be retiring/    I advised to please wait for Dr. Mansfield to send medication to specialty pharmacy. Once it's completed if possible to notify him so he can set up delivery. Then he will call back to schedule a nurse visit.     He said he will be running out of prednisone by Tuesday. He asked if he doesn't have to get the dupixent by then should he get a refill on prednisone?     I lasted about 45minutes on the phone reviewing his clinical questions and concerns.    He's scared of needles and says he would prefer nurse visits if possible. I asked if he wants he can bring a family member who's willing to learn during visit how to administer injection so it can be done at home too. Patient will see if wife can be there.

## 2024-01-22 DIAGNOSIS — L20.9 ATOPIC DERMATITIS IN ADULT: ICD-10-CM

## 2024-01-22 RX ORDER — PREDNISONE 10 MG/1
TABLET ORAL DAILY
Qty: 70 TABLET | Refills: 0 | Status: SHIPPED | OUTPATIENT
Start: 2024-01-22 | End: 2024-02-18

## 2024-01-24 ENCOUNTER — OFFICE VISIT (OUTPATIENT)
Dept: DERMATOLOGY | Facility: CLINIC | Age: 64
End: 2024-01-24
Payer: COMMERCIAL

## 2024-01-24 DIAGNOSIS — L20.9 ATOPIC DERMATITIS IN ADULT: Primary | ICD-10-CM

## 2024-01-24 PROCEDURE — 96372 THER/PROPH/DIAG INJ SC/IM: CPT | Performed by: DERMATOLOGY

## 2024-01-24 NOTE — PROGRESS NOTES
DUPIXENT Biologic Injectable Administration     Additional History of Present Condition:  Patient is present for education and administration of Dupixent. Medication administration order placed Yes. Provider order matches prescription order Yes.     Assessment and Plan:  Based on a thorough discussion of this condition and the management approach to it (including a comprehensive discussion of the known risks, side effects and potential benefits of treatment), the patient (family) agrees to implement the following specific plan:  First  Dupixent injection administered today in the left and right thigh  Verbal consent obtained to administer.   Next dose due 2/7/24, then 2/21/24  Yes  Patient provided education and training to continue to administer this at home.   Side effects discussed and how to report  Schedule 6 month follow up with ordering provider. 5/21/24      Biologic Injectable Administration Note  Diagnosis: Atopic Dermatitis  This is injection number 1 (2 pens) loading dose    Informed consent: Discussed risks (Risks of hypersensitivity reaction, injection site reaction, conjunctivitis/keratitis, HSV reactivation, increased susceptibility to parasitic infections, inefficacy were reviewed.) Verbal consent obtained.   Preparation: After discussion potential procedure related risks including pain, bleeding, new infection, reactivation of latent infection, inefficacy, increased risk of malignancy, hypersensitivity reaction, injection site reaction, verbal consent was obtained. The areas were cleansed with alcohol prep pads and allowed to fully air dry for 3 minutes.  Procedure Details:  300 mg was injected subcutaneously in the left thigh, 300 mg in right thigh  Lot Number: [id number]  Expiration: 10/31/2025  Total Injected: 600 mg  NDC: 5255799685     Patient tolerated procedure well, with minimal pinpoint bleeding that was controlled with pressure. Aftercare was reviewed.       What is DUPIXENT? DUPIXENT is a  prescription medicine used: „ to treat adults and children 6 months of age and older with moderate-to-severe eczema (atopic dermatitis or AD) that is not well controlled with prescription therapies used on the skin (topical), or who cannot use topical therapies. DUPIXENT can be used with or without topical corticosteroids. „ with other asthma medicines for the maintenance treatment of moderate-to-severe asthma in adults and children 6 years of age and older whose asthma is not controlled with their current asthma medicines. DUPIXENT helps prevent severe asthma attacks (exacerbations) and can improve your breathing. DUPIXENT may also help reduce the amount of oral corticosteroids you need while preventing severe asthma attacks and improving your breathing. DUPIXENT is not used to treat sudden breathing problems. „ with other medicines for the maintenance treatment of chronic rhinosinusitis with nasal polyposis (CRSwNP) in adults whose disease is not controlled. „ to treat adults and children 12 years of age and older, who weigh at least 88 pounds (40 kg), with eosinophilic esophagitis (EoE). „ to treat adults with prurigo nodularis (PN).  DUPIXENT works by blocking two proteins that contribute to a type of inflammation that plays a major role in atopic dermatitis, asthma, chronic rhinosinusitis with nasal polyposis, eosinophilic esophagitis, and prurigo nodularis.  It is not known if DUPIXENT is safe and effective in children with atopic dermatitis under 6 months of age.  It is not known if DUPIXENT is safe and effective in children with asthma under 6 years of age.  It is not known if DUPIXENT is safe and effective in children with chronic rhinosinusitis with nasal polyposis under 18 years of age.  It is not known if DUPIXENT is safe and effective in children with eosinophilic esophagitis under 12 years of age and who weigh at least 88 pounds (40 kg).  It is not known if DUPIXENT is safe and effective in children  with prurigo nodularis under 18 years of age      Before using DUPIXENT, tell your healthcare provider about all your medical conditions, including if you:  have eye problems.  have a parasitic (helminth) infection.  are scheduled to receive any vaccinations. You should not receive a ?live vaccine? right before and during treatment with DUPIXENT.  are pregnant or plan to become pregnant. It is not known whether DUPIXENT will harm your unborn baby. Pregnancy Exposure Registry. There is a pregnancy exposure registry for women who use DUPIXENT during pregnancy. The purpose of this registry is to collect information about the health of you and your baby. Your healthcare provider can enroll you in this registry. You may also enroll yourself or get more information about the registry by calling 1-439.115.6175 or going to [email or web address]  are breastfeeding or plan to breastfeed. It is not known whether DUPIXENT passes into your breast milk. Tell your healthcare provider about all of the medicines you take, including prescription and over-the-counter medicines, vitamins, and herbal supplements. Especially tell your healthcare provider if you:  are taking oral, topical, or inhaled corticosteroid medicines  have asthma and use an asthma medicine  have atopic dermatitis, chronic rhinosinusitis with nasal polyposis, eosinophilic esophagitis, or prurigo nodularis and also have asthma Do not change or stop your corticosteroid medicine or other asthma medicine without talking to your healthcare provider. This may cause other symptoms that were controlled by the corticosteroid medicine or other asthma medicine to come back.    What are the possible side effects of DUPIXENT? DUPIXENT can cause serious side effects, including:  Allergic reactions. DUPIXENT can cause allergic reactions that can sometimes be severe. Stop using DUPIXENT and tell your healthcare provider or get emergency help right away  if you get any of the following signs or symptoms: „ breathing problems or wheezing „ fast pulse „ fever „ general ill feeling „ swollen lymph nodes „ swelling of the face, lips, mouth, tongue, or throat „ hives „ itching „ nausea or vomiting „ fainting, dizziness, feeling lightheaded „ joint pain „ skin rash „ cramps in your stomach-area  Eye problems. Tell your healthcare provider if you have any new or worsening eye problems, including eye pain or changes in vision, such as blurred vision. Your healthcare provider may send you to an ophthalmologist for an eye exam if needed.  Inflammation of your blood vessels. Rarely, this can happen in people with asthma who receive DUPIXENT. This may happen in people who also take a steroid medicine by mouth that is being stopped or the dose is being lowered. It is not known whether this is caused by DUPIXENT. Tell your healthcare provider right away if you have: „ rash „ worsening shortness of breath „ persistent fever „ chest pain „ a feeling of pins and needles or numbness of your arms or legs  Joint aches and pain. Joint aches and pain can happen in people who use DUPIXENT. Some people have had trouble walking or moving due to their joint symptoms, and in some cases needed to be hospitalized. Tell your healthcare provider about any new or worsening joint symptoms. Your healthcare provider may stop DUPIXENT if you develop joint symptoms. The most common side effects of DUPIXENT include:  injection site reactions  upper respiratory tract infections  eye and eyelid inflammation, including redness, swelling, and itching, sometimes with blurred vision  herpes virus infections  common cold symptoms (nasopharyngitis)  cold sores in your mouth or on your lips  high count of a certain white blood cell (eosinophilia)  dizziness  muscle pain  diarrhea  pain in the throat (oropharyngeal pain)  gastritis  joint pain (arthralgia)  trouble sleeping (insomnia)  toothache  parasitic  (helminth) infections The following additional side effects have been reported with DUPIXENT:  facial rash or redness Tell your healthcare provider if you have any side effect that bothers you or that does not go away. These are not all of the possible side effects of DUPIXENT. Call your doctor for medical advice about side effects. You may report side effects to FDA at 3-957-FDA-5207       Scribe Attestation      I,:  Catherine Riley am acting as a scribe while in the presence of the attending physician.:       I,:  Maria D Mansfield MD personally performed the services described in this documentation    as scribed in my presence.:

## 2024-01-24 NOTE — PATIENT INSTRUCTIONS
DUPIXENT Biologic Injectable Administration     Additional History of Present Condition:  Patient is present for education and administration of Dupixent. Medication administration order placed Yes. Provider order matches prescription order Yes.     Assessment and Plan:  Based on a thorough discussion of this condition and the management approach to it (including a comprehensive discussion of the known risks, side effects and potential benefits of treatment), the patient (family) agrees to implement the following specific plan:  First  Dupixent injection administered today in the left and right thigh  Verbal consent obtained to administer.   Next dose due 2/7/24, then 2/21/24  Yes  Patient provided education and training to continue to administer this at home.   Side effects discussed and how to report  Schedule 6 month follow up with ordering provider. 5/21/24      Biologic Injectable Administration Note  Diagnosis: Atopic Dermatitis  This is injection number 1 (2 pens) loading dose    Informed consent: Discussed risks (Risks of hypersensitivity reaction, injection site reaction, conjunctivitis/keratitis, HSV reactivation, increased susceptibility to parasitic infections, inefficacy were reviewed.) Verbal consent obtained.   Preparation: After discussion potential procedure related risks including pain, bleeding, new infection, reactivation of latent infection, inefficacy, increased risk of malignancy, hypersensitivity reaction, injection site reaction, verbal consent was obtained. The areas were cleansed with alcohol prep pads and allowed to fully air dry for 3 minutes.  Procedure Details:  300 mg was injected subcutaneously in the left thigh, 300 mg in right thigh  Lot Number: [id number]  Expiration: 10/31/2025  Total Injected: 600 mg  NDC: 0220922543     Patient tolerated procedure well, with minimal pinpoint bleeding that was controlled with pressure. Aftercare was reviewed.       What is DUPIXENT? DUPIXENT is a  prescription medicine used: „ to treat adults and children 6 months of age and older with moderate-to-severe eczema (atopic dermatitis or AD) that is not well controlled with prescription therapies used on the skin (topical), or who cannot use topical therapies. DUPIXENT can be used with or without topical corticosteroids. „ with other asthma medicines for the maintenance treatment of moderate-to-severe asthma in adults and children 6 years of age and older whose asthma is not controlled with their current asthma medicines. DUPIXENT helps prevent severe asthma attacks (exacerbations) and can improve your breathing. DUPIXENT may also help reduce the amount of oral corticosteroids you need while preventing severe asthma attacks and improving your breathing. DUPIXENT is not used to treat sudden breathing problems. „ with other medicines for the maintenance treatment of chronic rhinosinusitis with nasal polyposis (CRSwNP) in adults whose disease is not controlled. „ to treat adults and children 12 years of age and older, who weigh at least 88 pounds (40 kg), with eosinophilic esophagitis (EoE). „ to treat adults with prurigo nodularis (PN).  DUPIXENT works by blocking two proteins that contribute to a type of inflammation that plays a major role in atopic dermatitis, asthma, chronic rhinosinusitis with nasal polyposis, eosinophilic esophagitis, and prurigo nodularis.  It is not known if DUPIXENT is safe and effective in children with atopic dermatitis under 6 months of age.  It is not known if DUPIXENT is safe and effective in children with asthma under 6 years of age.  It is not known if DUPIXENT is safe and effective in children with chronic rhinosinusitis with nasal polyposis under 18 years of age.  It is not known if DUPIXENT is safe and effective in children with eosinophilic esophagitis under 12 years of age and who weigh at least 88 pounds (40 kg).  It is not known if DUPIXENT is safe and effective in children  with prurigo nodularis under 18 years of age      Before using DUPIXENT, tell your healthcare provider about all your medical conditions, including if you:  have eye problems.  have a parasitic (helminth) infection.  are scheduled to receive any vaccinations. You should not receive a ?live vaccine? right before and during treatment with DUPIXENT.  are pregnant or plan to become pregnant. It is not known whether DUPIXENT will harm your unborn baby. Pregnancy Exposure Registry. There is a pregnancy exposure registry for women who use DUPIXENT during pregnancy. The purpose of this registry is to collect information about the health of you and your baby. Your healthcare provider can enroll you in this registry. You may also enroll yourself or get more information about the registry by calling 1-226.273.9019 or going to [email or web address]  are breastfeeding or plan to breastfeed. It is not known whether DUPIXENT passes into your breast milk. Tell your healthcare provider about all of the medicines you take, including prescription and over-the-counter medicines, vitamins, and herbal supplements. Especially tell your healthcare provider if you:  are taking oral, topical, or inhaled corticosteroid medicines  have asthma and use an asthma medicine  have atopic dermatitis, chronic rhinosinusitis with nasal polyposis, eosinophilic esophagitis, or prurigo nodularis and also have asthma Do not change or stop your corticosteroid medicine or other asthma medicine without talking to your healthcare provider. This may cause other symptoms that were controlled by the corticosteroid medicine or other asthma medicine to come back.    What are the possible side effects of DUPIXENT? DUPIXENT can cause serious side effects, including:  Allergic reactions. DUPIXENT can cause allergic reactions that can sometimes be severe. Stop using DUPIXENT and tell your healthcare provider or get emergency help right away  if you get any of the following signs or symptoms: „ breathing problems or wheezing „ fast pulse „ fever „ general ill feeling „ swollen lymph nodes „ swelling of the face, lips, mouth, tongue, or throat „ hives „ itching „ nausea or vomiting „ fainting, dizziness, feeling lightheaded „ joint pain „ skin rash „ cramps in your stomach-area  Eye problems. Tell your healthcare provider if you have any new or worsening eye problems, including eye pain or changes in vision, such as blurred vision. Your healthcare provider may send you to an ophthalmologist for an eye exam if needed.  Inflammation of your blood vessels. Rarely, this can happen in people with asthma who receive DUPIXENT. This may happen in people who also take a steroid medicine by mouth that is being stopped or the dose is being lowered. It is not known whether this is caused by DUPIXENT. Tell your healthcare provider right away if you have: „ rash „ worsening shortness of breath „ persistent fever „ chest pain „ a feeling of pins and needles or numbness of your arms or legs  Joint aches and pain. Joint aches and pain can happen in people who use DUPIXENT. Some people have had trouble walking or moving due to their joint symptoms, and in some cases needed to be hospitalized. Tell your healthcare provider about any new or worsening joint symptoms. Your healthcare provider may stop DUPIXENT if you develop joint symptoms. The most common side effects of DUPIXENT include:  injection site reactions  upper respiratory tract infections  eye and eyelid inflammation, including redness, swelling, and itching, sometimes with blurred vision  herpes virus infections  common cold symptoms (nasopharyngitis)  cold sores in your mouth or on your lips  high count of a certain white blood cell (eosinophilia)  dizziness  muscle pain  diarrhea  pain in the throat (oropharyngeal pain)  gastritis  joint pain (arthralgia)  trouble sleeping (insomnia)  toothache  parasitic  (helminth) infections The following additional side effects have been reported with DUPIXENT:  facial rash or redness Tell your healthcare provider if you have any side effect that bothers you or that does not go away. These are not all of the possible side effects of DUPIXENT. Call your doctor for medical advice about side effects. You may report side effects to FDA at 6-740-FDA-8866

## 2024-02-07 ENCOUNTER — TELEPHONE (OUTPATIENT)
Age: 64
End: 2024-02-07

## 2024-02-07 NOTE — TELEPHONE ENCOUNTER
Patient calling about this script. Please advise patient on status.    Wartpeel Counseling:  I discussed with the patient the risks of Wartpeel including but not limited to erythema, scaling, itching, weeping, crusting, and pain.

## 2024-02-07 NOTE — TELEPHONE ENCOUNTER
Ambika from [facility] called to let us know that  Dupixent has been rejected due to limitations insurance plan , patient is due for his next injection tomorrow  .She said that we need to call [facility] to push this through. Their phone number is  [phone].

## 2024-02-07 NOTE — TELEPHONE ENCOUNTER
Called [facility] they can not fill the prescription if the office do not call [employer] to override the rejection .   I also called 3 times (over an hour each call )to [facility]  looking to speak with a representative  to the number provided earlier [phone] unsuccessfully .   Patient is due for his injection on 02/08/2024

## 2024-02-07 NOTE — TELEPHONE ENCOUNTER
Phone call to [facility] to discuss problem with patient not being able to receive his Dupixent. I was on hold for 35 minutes. I did leave a message with my direct number and asked for a return call today.

## 2024-02-07 NOTE — TELEPHONE ENCOUNTER
"Phone call to patient to advise that we have attempted to reach [facility]. Patient informed me that he received a call from patient's insurance and they will be shipping medication tomorrow, 2/8/24. Medication cannot be shipped before 21 days before last shipment. Patient reports he is doing \"great\" on Dupixent and tapering off on Prednisone. Patient stopped taking Atarax at this point because he is no longer itching.   "

## 2024-02-17 DIAGNOSIS — L20.9 ATOPIC DERMATITIS IN ADULT: ICD-10-CM

## 2024-02-19 RX ORDER — PREDNISONE 10 MG/1
TABLET ORAL
Qty: 70 TABLET | Refills: 0 | OUTPATIENT
Start: 2024-02-19

## 2024-02-22 ENCOUNTER — TELEPHONE (OUTPATIENT)
Age: 64
End: 2024-02-22

## 2024-02-22 DIAGNOSIS — R21 RASH: Primary | ICD-10-CM

## 2024-02-22 RX ORDER — PREDNISONE 10 MG/1
TABLET ORAL DAILY
Qty: 30 TABLET | Refills: 0 | Status: SHIPPED | OUTPATIENT
Start: 2024-02-22 | End: 2024-02-22

## 2024-02-22 RX ORDER — PREDNISONE 10 MG/1
TABLET ORAL DAILY
Qty: 30 TABLET | Refills: 0 | Status: SHIPPED | OUTPATIENT
Start: 2024-02-22 | End: 2024-03-05

## 2024-02-22 NOTE — TELEPHONE ENCOUNTER
Patient called to let us know that he sent a message through the portal and is worry and rash is worsening and he couldn't sleep last night for 3 hours.        [employer] message was sent to Dr. Mansfield to respond from [facility] message encounter that was started back on 1/12/2024.

## 2024-03-12 ENCOUNTER — TELEPHONE (OUTPATIENT)
Age: 64
End: 2024-03-12

## 2024-03-12 NOTE — TELEPHONE ENCOUNTER
Patient has a rash all over his body and would like prednisone until dupixent is effective.    Please review new order needed for prednisone     Patient called the RX Refill Line. Message is being forwarded to the office.     Please contact patient at  [phone]

## 2024-03-12 NOTE — TELEPHONE ENCOUNTER
Called and left a message for patient letting him know that Dr. Mansfield said sorry to hear you're not better, she does not have access to those blood tests. She would not recommend further prednisone at the moment. We should probably biopsy the rash. She is at the [facility] in [place] with residents this week and we can accommodate him this Thursday 3/14/24 at 2:40 pm

## 2024-03-12 NOTE — TELEPHONE ENCOUNTER
Rec'd return call from patient.    ACCEPTED appt in [place] with Dr. Mansfield for this Thursday, 3/14 @ 2:40 pm at [facility]. Patient aware of office location.    (Updated patients phone numbers in EPIC.)    CLINICAL: please book appt for patient.    Thank you.

## 2024-03-14 ENCOUNTER — OFFICE VISIT (OUTPATIENT)
Dept: DERMATOLOGY | Facility: CLINIC | Age: 64
End: 2024-03-14

## 2024-03-14 VITALS — WEIGHT: 148 LBS | HEIGHT: 68 IN | BODY MASS INDEX: 22.43 KG/M2

## 2024-03-14 DIAGNOSIS — R21 RASH: Primary | ICD-10-CM

## 2024-03-14 PROCEDURE — 88307 TISSUE EXAM BY PATHOLOGIST: CPT | Performed by: STUDENT IN AN ORGANIZED HEALTH CARE EDUCATION/TRAINING PROGRAM

## 2024-03-14 PROCEDURE — 88341 IMHCHEM/IMCYTCHM EA ADD ANTB: CPT | Performed by: STUDENT IN AN ORGANIZED HEALTH CARE EDUCATION/TRAINING PROGRAM

## 2024-03-14 PROCEDURE — 88342 IMHCHEM/IMCYTCHM 1ST ANTB: CPT | Performed by: STUDENT IN AN ORGANIZED HEALTH CARE EDUCATION/TRAINING PROGRAM

## 2024-03-14 PROCEDURE — 88313 SPECIAL STAINS GROUP 2: CPT | Performed by: STUDENT IN AN ORGANIZED HEALTH CARE EDUCATION/TRAINING PROGRAM

## 2024-03-14 RX ORDER — CLOBETASOL PROPIONATE 0.5 MG/G
CREAM TOPICAL 2 TIMES DAILY
Qty: 45 G | Refills: 1 | Status: SHIPPED | OUTPATIENT
Start: 2024-03-14

## 2024-03-14 NOTE — PATIENT INSTRUCTIONS
RASH    Physical Exam:  (Anatomic Location); (Size and Morphological Description); (Differential Diagnosis):  Scattered eczematous macules and patches on upper and lower extremities   Pertinent Positives:  Pertinent Negatives:    Additional History of Present Condition:  Patient presents with what he says is a new rash than the rash he has had for 10 years. Still injecting Dupixent, last injection was 3/5/24. Extremely itchy. Prednisone clears rash until he stops it. Type II Diabetes. Blood work done in [facility] February 2024. Clobetasol 0.05% Cream hasn't used in two weeks. Unsure how to use it. Cerave. Rash present today on lower legs, fingers, palms, forearms. Not using hydroxyzine currently.     Assessment and Plan:  Based on a thorough discussion of this condition and the management approach to it (including a comprehensive discussion of the known risks, side effects and potential benefits of treatment), the patient (family) agrees to implement the following specific plan:  Punch biopsy done in the office today. We will call you with the results.  KOH done in the office today, Negative.  Recommend taking Atarax before bed to help with itch.  Clobetasol 0.05% Cream: Apply topically twice a day for two weeks to affected areas. Prescription sent to your pharmacy.   Recommend continuing with Dupixent injections  Can try wet wrap therapy: WET WRAP THERAPY    What is wet wrap therapy?  Wet wrap therapy is a useful tool in the treatment of eczema (atopic dermatitis). Wet wraps can help:  Put moisture into the skin  Increase absorbtion of topical steroids into the skin, so more medicine is delivered to the affected areas  Act as a barrier to keep your child from scratching, which can help your child sleep better    When are they used?  Wet wraps are usually for severe eczema flare-ups. They are most often used for only a few days at a time, but can be used for longer as instructed by your healthcare provider. Wet wraps can  also be used without topical steroids to help moisturizers work better on areas that are very dry.    What supplies do I need?  Before you do a wet wrap, gather these supplies:  Topical steroid ointment prescribed by your child’s healthcare provider  Moisturizer (emollient)- we often suggest using a clear, greasy ointment like Vaseline  Two layers of a wrap of your choice: one for a wet layer and the other for a dry layer.    What kind of wrap do I choose?  Chose the wrap that works best for the part of the body you are covering.  Here are some examples of materials that can work well:  Gauze: This is great for any body area, but can be expensive.  Fitted cotton pajamas: This is great for more widespread rash.  100% cotton socks: This can be helpful for stubborn eczema on the hands or feet.  100% cotton adult socks: If you cut a small hole in the toe of an adult sock, you will have a great wrap that will fit easily over an arm or a leg that can be moved up or down as needed.    How do I apply the wet wrap?  It is best to apply a wet wrap after a bath. Because wet wraps can take a long time to apply, your child may resist putting them on. Positive incentives can help. Wet wrap therapy is a useful tool to treat eczema.     STEPS FOR APPLYING THE WET WRAPS:  Apply the steroid ointment DIRECTLY to your child’s inflamed skin as directed. (Skip this step if your provider has asked you to apply wet wraps just for moisturization.)  Apply a generous layer of moisturizer (emollient) to all of your child’s skin or as directed by your provider.  Soak one layer of wrap in warm water.  Wring out excess water until it is slightly damp.  Wrap the affected area with this wet layer, making sure that it is not too tight.  Immediately put the dry layer on over the wet layer. Do NOT cover with plastic as this may be accidentally ingested and lead to suffocation..  Try to keep the child in a warm environment or cover them with a blanket  so they don’t get cold.  Wet wraps are sometimes left in place overnight, but your child’s healthcare provider might ask you to leave it on for 1 or 2 hours. As always, follow the specific advice of your provider for frequency and duration of wet wrap therapy.  Throw away used gauze wraps. You may wash and reuse cotton wraps.  If you have any questions or concerns, contact your child’s provider.       PROCEDURE NOTE:  PUNCH BIOPSY      Performing Physician:     Anatomic Location; Clinical Description with size (cm); Pre-Op Diagnosis:    A; Left forearm; 4 mm punch; Scattered eczematous macules and patches on upper and lower extremities; Diff Dx: Eczematous dermatitis VS scabies       Anesthesia: 3:1 1% xylocaine with epi and 1-100,000 buffered      Topical anesthesia: None       Indications: To indicate diagnosis and management plan.    Procedure Details     Patient informed of the risks (including bleeding,scaring and infection) and benefits of the procedure explained. Verbal and written informed consent obtained. The area was prepped and draped in the usual fashion. Anesthesia was obtained with 1% lidocaine with epinephrine. The skin was then stretched perpendicular to the skin tension lines and a punch biopsy to an appropriate sampling depth was obtained with a 4 mm punch with a forceps and iris scissors.     Hemostasis was obtained with 4-0 Ethilon x 1 sutures.     Complications:  None      Specimen has been sent for review by Dermatopathology.      Plan:  1. Instructed to keep the wound dry and covered for 24-48h and clean thereafter.  2. Warning signs of infection were reviewed.    3. Recommended that the patient use acetaminophen as needed for pain  4. Sutures if any should be removed in 10-14 days      Standard post-procedure care has been explained and has been included in written form within the patient's copy of Informed Consent.

## 2024-03-14 NOTE — PROGRESS NOTES
"[facility] Dermatology Clinic Note     Patient Name: Edmond Ulrich  Encounter Date: 3/14/2024     Have you been cared for by a [facility] Dermatologist in the last 3 years and, if so, which description applies to you?    Yes.  I have been here within the last 3 years, and my medical history has NOT changed since that time.  I am MALE/not capable of bearing children.    REVIEW OF SYSTEMS:  Have you recently had or currently have any of the following? No changes in my recent health.   PAST MEDICAL HISTORY:  Have you personally ever had or currently have any of the following?  If \"YES,\" then please provide more detail. No changes in my medical history.   HISTORY OF IMMUNOSUPPRESSION: Do you have a history of any of the following:  Systemic Immunosuppression such as Diabetes, Biologic or Immunotherapy, Chemotherapy, Organ Transplantation, Bone Marrow Transplantation?  No     Answering \"YES\" requires the addition of the dotphrase \"IMMUNOSUPPRESSED\" as the first diagnosis of the patient's visit.   FAMILY HISTORY:  Any \"first degree relatives\" (parent, brother, sister, or child) with the following?    No changes in my family's known health.   PATIENT EXPERIENCE:    Do you want the Dermatologist to perform a COMPLETE skin exam today including a clinical examination under the \"bra and underwear\" areas?  NO  If necessary, do we have your permission to call and leave a detailed message on your Preferred Phone number that includes your specific medical information?  Yes      Allergies   Allergen Reactions    Cefixime Other (See Comments)    Ciprofloxacin GI Intolerance    Clarithromycin GI Intolerance    Levofloxacin GI Intolerance    Oxycodone GI Intolerance    Morphine Rash      Current Outpatient Medications:     Ascorbic Acid, Vitamin C, (VITAMIN C) 100 MG tablet, Vitamin C, Disp: , Rfl:     clobetasol (TEMOVATE) 0.05 % cream, Apply topically 2 (two) times a day, Disp: 60 g, Rfl: 1    cycloSPORINE (Restasis) 0.05 % " ophthalmic emulsion, Restasis 0.05 % eye drops in a dropperette  INSTILL 1 DROP INTO BOTH EYES TWICE A DAY, Disp: , Rfl:     Dupilumab (Dupixent) 300 MG/2ML SOPN, Inject 300mg (1 pen) on week 6 then every other week there after for maintenance dose, Disp: 4 mL, Rfl: 10    Dupilumab (Dupixent) 300 MG/2ML SOPN, Inject 600mg (2 pens) on week 0 then 300mg (1pen) on week 2 and week 4 for loading dose., Disp: 8 mL, Rfl: 0    fluticasone (FLONASE) 50 mcg/act nasal spray, 2 sprays into each nostril daily, Disp: , Rfl:     hydrOXYzine HCL (ATARAX) 25 mg tablet, Take 1 tablet (25 mg total) by mouth daily at bedtime, Disp: 30 tablet, Rfl: 0    ketoconazole (NIZORAL) 2 % shampoo, ketoconazole 2 % shampoo  SHAMPOO HAIR  2-3 TIMES A WEEK (Patient not taking: Reported on 11/29/2023), Disp: , Rfl:     Multiple Vitamin (MULTIVITAMIN ADULT PO), multivitamin, Disp: , Rfl:     mupirocin (BACTROBAN) 2 % ointment, mupirocin 2 % topical ointment  APPLY INTRANASALLY TWICE A DAY  as directed (Patient not taking: Reported on 10/4/2023), Disp: , Rfl:     solifenacin (VESICARE) 10 MG tablet, Take 10 mg by mouth daily, Disp: , Rfl:     tamsulosin (FLOMAX) 0.4 mg, TAKE 1 CAPSULE BY MOUTH EVERY DAY AT NIGHT, Disp: , Rfl:           Whom besides the patient is providing clinical information about today's encounter?   NO ADDITIONAL HISTORIAN (patient alone provided history)    Physical Exam and Assessment/Plan by Diagnosis:      RASH    Physical Exam:  (Anatomic Location); (Size and Morphological Description); (Differential Diagnosis):  Scattered eczematous macules and patches on upper and lower extremities   Pertinent Positives:  Pertinent Negatives:                          Additional History of Present Condition:  Patient presents with what he says is a new rash than the rash he has had for 10 years. Still injecting Dupixent, last injection was 3/5/24. Extremely itchy. Prednisone clears rash until he stops it. Type II Diabetes. Blood work done in  [place] February 2024. Clobetasol 0.05% Cream hasn't used in two weeks. Unsure how to use it. Cerave. Rash present today on lower legs, fingers, palms, forearms. Not using hydroxyzine currently. He has had multiple courses of prednisone to relieve rash    Assessment and Plan:  Based on a thorough discussion of this condition and the management approach to it (including a comprehensive discussion of the known risks, side effects and potential benefits of treatment), the patient (family) agrees to implement the following specific plan:  Punch biopsy done in the office today. We will call you with the results.  KOH done in the office today, Negative.  Recommend taking Atarax before bed to help with itch.  Clobetasol 0.05% Cream: Apply topically twice a day for two weeks to affected areas. Prescription sent to your pharmacy.   Recommend continuing with Dupixent injections  Can try wet wrap therapy: WET WRAP THERAPY    What is wet wrap therapy?  Wet wrap therapy is a useful tool in the treatment of eczema (atopic dermatitis). Wet wraps can help:  Put moisture into the skin  Increase absorbtion of topical steroids into the skin, so more medicine is delivered to the affected areas  Act as a barrier to keep your child from scratching, which can help your child sleep better    When are they used?  Wet wraps are usually for severe eczema flare-ups. They are most often used for only a few days at a time, but can be used for longer as instructed by your healthcare provider. Wet wraps can also be used without topical steroids to help moisturizers work better on areas that are very dry.    What supplies do I need?  Before you do a wet wrap, gather these supplies:  Topical steroid ointment prescribed by your child’s healthcare provider  Moisturizer (emollient)- we often suggest using a clear, greasy ointment like Vaseline  Two layers of a wrap of your choice: one for a wet layer and the other for a dry layer.    What kind of wrap do  I choose?  Chose the wrap that works best for the part of the body you are covering.  Here are some examples of materials that can work well:  Gauze: This is great for any body area, but can be expensive.  Fitted cotton pajamas: This is great for more widespread rash.  100% cotton socks: This can be helpful for stubborn eczema on the hands or feet.  100% cotton adult socks: If you cut a small hole in the toe of an adult sock, you will have a great wrap that will fit easily over an arm or a leg that can be moved up or down as needed.    How do I apply the wet wrap?  It is best to apply a wet wrap after a bath. Because wet wraps can take a long time to apply, your child may resist putting them on. Positive incentives can help. Wet wrap therapy is a useful tool to treat eczema.     STEPS FOR APPLYING THE WET WRAPS:  Apply the steroid ointment DIRECTLY to your child’s inflamed skin as directed. (Skip this step if your provider has asked you to apply wet wraps just for moisturization.)  Apply a generous layer of moisturizer (emollient) to all of your child’s skin or as directed by your provider.  Soak one layer of wrap in warm water.  Wring out excess water until it is slightly damp.  Wrap the affected area with this wet layer, making sure that it is not too tight.  Immediately put the dry layer on over the wet layer. Do NOT cover with plastic as this may be accidentally ingested and lead to suffocation..  Try to keep the child in a warm environment or cover them with a blanket so they don’t get cold.  Wet wraps are sometimes left in place overnight, but your child’s healthcare provider might ask you to leave it on for 1 or 2 hours. As always, follow the specific advice of your provider for frequency and duration of wet wrap therapy.  Throw away used gauze wraps. You may wash and reuse cotton wraps.  If you have any questions or concerns, contact your child’s provider.       PROCEDURE NOTE:  PUNCH BIOPSY      Performing  Physician:     Anatomic Location; Clinical Description with size (cm); Pre-Op Diagnosis:    A; Left forearm; 4 mm punch; Scattered eczematous macules and patches on upper and lower extremities; Diff Dx: Eczematous dermatitis VS scabies       Anesthesia: 3:1 1% xylocaine with epi and 1-100,000 buffered      Topical anesthesia: None       Indications: To indicate diagnosis and management plan.    Procedure Details     Patient informed of the risks (including bleeding,scaring and infection) and benefits of the procedure explained. Verbal and written informed consent obtained. The area was prepped and draped in the usual fashion. Anesthesia was obtained with 1% lidocaine with epinephrine. The skin was then stretched perpendicular to the skin tension lines and a punch biopsy to an appropriate sampling depth was obtained with a 4 mm punch with a forceps and iris scissors.     Hemostasis was obtained with 4-0 Ethilon x 1 sutures.     Complications:  None      Specimen has been sent for review by Dermatopathology.      Plan:  1. Instructed to keep the wound dry and covered for 24-48h and clean thereafter.  2. Warning signs of infection were reviewed.    3. Recommended that the patient use acetaminophen as needed for pain  4. Sutures if any should be removed in 10-14 days      Standard post-procedure care has been explained and has been included in written form within the patient's copy of Informed Consent.       Scribe Attestation      I,:  Catherine Riley am acting as a scribe while in the presence of the attending physician.:       I,:  Maria D Mansfield MD personally performed the services described in this documentation    as scribed in my presence.:            Resident Dr. Morales

## 2024-03-19 PROCEDURE — 88342 IMHCHEM/IMCYTCHM 1ST ANTB: CPT | Performed by: STUDENT IN AN ORGANIZED HEALTH CARE EDUCATION/TRAINING PROGRAM

## 2024-03-19 PROCEDURE — 88307 TISSUE EXAM BY PATHOLOGIST: CPT | Performed by: STUDENT IN AN ORGANIZED HEALTH CARE EDUCATION/TRAINING PROGRAM

## 2024-03-19 PROCEDURE — 88313 SPECIAL STAINS GROUP 2: CPT | Performed by: STUDENT IN AN ORGANIZED HEALTH CARE EDUCATION/TRAINING PROGRAM

## 2024-03-19 PROCEDURE — 88341 IMHCHEM/IMCYTCHM EA ADD ANTB: CPT | Performed by: STUDENT IN AN ORGANIZED HEALTH CARE EDUCATION/TRAINING PROGRAM

## 2024-03-22 DIAGNOSIS — R21 RASH: Primary | ICD-10-CM

## 2024-04-16 ENCOUNTER — OFFICE VISIT (OUTPATIENT)
Dept: DERMATOLOGY | Facility: CLINIC | Age: 64
End: 2024-04-16
Payer: COMMERCIAL

## 2024-04-16 DIAGNOSIS — R21 RASH: Primary | ICD-10-CM

## 2024-04-16 PROCEDURE — 99213 OFFICE O/P EST LOW 20 MIN: CPT | Performed by: STUDENT IN AN ORGANIZED HEALTH CARE EDUCATION/TRAINING PROGRAM

## 2024-04-16 NOTE — PROGRESS NOTES
"[facility] Dermatology Clinic Note     Patient Name: Edmond Ulrich  Encounter Date: 04/16/2024     Have you been cared for by a [facility] Dermatologist in the last 3 years and, if so, which description applies to you?    Yes.  I have been here within the last 3 years, and my medical history has NOT changed since that time.  I am MALE/not capable of bearing children.    REVIEW OF SYSTEMS:  Have you recently had or currently have any of the following? No changes in my recent health.   PAST MEDICAL HISTORY:  Have you personally ever had or currently have any of the following?  If \"YES,\" then please provide more detail. No changes in my medical history.   HISTORY OF IMMUNOSUPPRESSION: Do you have a history of any of the following:  Systemic Immunosuppression such as Diabetes, Biologic or Immunotherapy, Chemotherapy, Organ Transplantation, Bone Marrow Transplantation?  YES, type 2 Diabetes      Answering \"YES\" requires the addition of the dotphrase \"IMMUNOSUPPRESSED\" as the first diagnosis of the patient's visit.   FAMILY HISTORY:  Any \"first degree relatives\" (parent, brother, sister, or child) with the following?    No changes in my family's known health.   PATIENT EXPERIENCE:    Do you want the Dermatologist to perform a COMPLETE skin exam today including a clinical examination under the \"bra and underwear\" areas?  NO  If necessary, do we have your permission to call and leave a detailed message on your Preferred Phone number that includes your specific medical information?  Yes      Allergies   Allergen Reactions    Cefixime Other (See Comments)    Ciprofloxacin GI Intolerance    Clarithromycin GI Intolerance    Levofloxacin GI Intolerance    Oxycodone GI Intolerance    Morphine Rash      Current Outpatient Medications:     Ascorbic Acid, Vitamin C, (VITAMIN C) 100 MG tablet, Vitamin C, Disp: , Rfl:     clobetasol (TEMOVATE) 0.05 % cream, Apply topically 2 (two) times a day For two weeks to affected areas. Avoid " face, groin, axilla, Disp: 45 g, Rfl: 1    cycloSPORINE (Restasis) 0.05 % ophthalmic emulsion, Restasis 0.05 % eye drops in a dropperette  INSTILL 1 DROP INTO BOTH EYES TWICE A DAY, Disp: , Rfl:     Dupilumab (Dupixent) 300 MG/2ML SOPN, Inject 300mg (1 pen) on week 6 then every other week there after for maintenance dose, Disp: 4 mL, Rfl: 10    Dupilumab (Dupixent) 300 MG/2ML SOPN, Inject 600mg (2 pens) on week 0 then 300mg (1pen) on week 2 and week 4 for loading dose. (Patient not taking: Reported on 3/14/2024), Disp: 8 mL, Rfl: 0    fluticasone (FLONASE) 50 mcg/act nasal spray, 2 sprays into each nostril daily, Disp: , Rfl:     hydrOXYzine HCL (ATARAX) 25 mg tablet, Take 1 tablet (25 mg total) by mouth daily at bedtime (Patient not taking: Reported on 3/14/2024), Disp: 30 tablet, Rfl: 0    ketoconazole (NIZORAL) 2 % shampoo, ketoconazole 2 % shampoo  SHAMPOO HAIR  2-3 TIMES A WEEK (Patient not taking: Reported on 11/29/2023), Disp: , Rfl:     Multiple Vitamin (MULTIVITAMIN ADULT PO), multivitamin, Disp: , Rfl:     mupirocin (BACTROBAN) 2 % ointment, mupirocin 2 % topical ointment  APPLY INTRANASALLY TWICE A DAY  as directed (Patient not taking: Reported on 10/4/2023), Disp: , Rfl:     solifenacin (VESICARE) 10 MG tablet, Take 10 mg by mouth daily, Disp: , Rfl:     tamsulosin (FLOMAX) 0.4 mg, TAKE 1 CAPSULE BY MOUTH EVERY DAY AT NIGHT, Disp: , Rfl:           Whom besides the patient is providing clinical information about today's encounter?   NO ADDITIONAL HISTORIAN (patient alone provided history)    Physical Exam and Assessment/Plan by Diagnosis:      RASH-Hypersensitivity reaction        Physical Exam:  (Anatomic Location); (Size and Morphological Description); (Differential Diagnosis):  Patches on palms, fingers, and bilateral legs     Additional History of Present Condition:  Patient presents for follow up on rash. 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 ROS negative for symptoms concerning for CTD and LOGAN run and negative. Patient is not on oral prednisone and is not currently using the Clobetasol as recommended. He performed the wet wraps a few times when the itching was severe. He took the Atarax for a few days but stopped this once the itching was minimal. Patient states his itchiness is minimal. He states he has had this rash for 10 years. He has been taking the same multivitamin and vitamin C supplement for the last 10 years. He started the tamsulosin and solifenacin 2 years ago. Patient has a history of atopic dermatitis and is currently on Dupixent.     Assessment and Plan:  - Biopsy with vacuolar dermatitis suggestive of drug vs CTD as underlying etiology with CTD less likely given absence of symptoms and negative LOGAN  - No clear drug trigger outside of possible multivitamin. Tamsulosin also taken but initiated within past two years (after initial onset of rash but before recent worsening/exaccerbation)  Based on a thorough discussion of this condition and the management approach to it (including a comprehensive discussion of the known risks, side effects and potential benefits of treatment), the patient (family) agrees to implement the following specific plan:  Stop taking multivitamin and Vitamin C.   Follow-up with urologist about trialing off Flomax (tamsulosin) and starting a alternative medication.  Continue Dupixent as directed.  Discussed possible phototherapy.  Follow up in 4 to 8 weeks.         Scribe Attestation      I,:  Jamee Oakes am acting as a scribe while in the presence of the attending physician.:       I,:  Rajan Rock MD personally performed the services described in this documentation    as scribed in my presence.:             Patient was seen and discussed with Dr. Rock.   Laurie Riley PA-C

## 2024-04-16 NOTE — PATIENT INSTRUCTIONS
RASH/ Hypersensitive reaction    Assessment and Plan:  Based on a thorough discussion of this condition and the management approach to it (including a comprehensive discussion of the known risks, side effects and potential benefits of treatment), the patient (family) agrees to implement the following specific plan:  Stop taking multivitamin   Talk with urologist about trailing off Flomax and starting a alternative medication   Continue Dupixent as directed   Discussed possible phototherapy   Follow up in 4 to 8 weeks.

## 2024-05-21 ENCOUNTER — OFFICE VISIT (OUTPATIENT)
Dept: DERMATOLOGY | Facility: CLINIC | Age: 64
End: 2024-05-21
Payer: COMMERCIAL

## 2024-05-21 VITALS — WEIGHT: 145 LBS | BODY MASS INDEX: 21.98 KG/M2 | HEIGHT: 68 IN | TEMPERATURE: 97.7 F

## 2024-05-21 DIAGNOSIS — L20.9 ATOPIC DERMATITIS IN ADULT: Primary | ICD-10-CM

## 2024-05-21 PROCEDURE — 99213 OFFICE O/P EST LOW 20 MIN: CPT | Performed by: DERMATOLOGY

## 2024-05-21 RX ORDER — ALFUZOSIN HYDROCHLORIDE 10 MG/1
10 TABLET, EXTENDED RELEASE ORAL DAILY
COMMUNITY
Start: 2024-05-15 | End: 2025-05-15

## 2024-05-21 NOTE — PROGRESS NOTES
"[facility] Note     Patient Name: Edmond Ulrich  Encounter Date: 5/21/2024     Have you been cared for by a [facility] in the last 3 years and, if so, which description applies to you?    Yes.  I have been here within the last 3 years, and my medical history has NOT changed since that time.  I am MALE/not capable of bearing children.    REVIEW OF SYSTEMS:  Have you recently had or currently have any of the following? No changes in my recent health.   PAST MEDICAL HISTORY:  Have you personally ever had or currently have any of the following?  If \"YES,\" then please provide more detail. No changes in my medical history.   HISTORY OF IMMUNOSUPPRESSION: Do you have a history of any of the following:  Systemic Immunosuppression such as Diabetes, Biologic or Immunotherapy, Chemotherapy, Organ Transplantation, Bone Marrow Transplantation?  YES, Diabetes      Answering \"YES\" requires the addition of the dotphrase \"IMMUNOSUPPRESSED\" as the first diagnosis of the patient's visit.   FAMILY HISTORY:  Any \"first degree relatives\" (parent, brother, sister, or child) with the following?    No changes in my family's known health.   PATIENT EXPERIENCE:    Do you want the Dermatologist to perform a COMPLETE skin exam today including a clinical examination under the \"bra and underwear\" areas?  NO  If necessary, do we have your permission to call and leave a detailed message on your Preferred Phone number that includes your specific medical information?  Yes      Allergies   Allergen Reactions    Cefixime Other (See Comments)    Ciprofloxacin GI Intolerance    Clarithromycin GI Intolerance    Levofloxacin GI Intolerance    Oxycodone GI Intolerance    Morphine Rash      Current Outpatient Medications:     Ascorbic Acid, Vitamin C, (VITAMIN C) 100 MG tablet, Vitamin C, Disp: , Rfl:     clobetasol (TEMOVATE) 0.05 % cream, Apply topically 2 (two) times a day For two weeks to affected areas. Avoid face, " "groin, axilla, Disp: 45 g, Rfl: 1    cycloSPORINE (Restasis) 0.05 % ophthalmic emulsion, Restasis 0.05 % eye drops in a dropperette  INSTILL 1 DROP INTO BOTH EYES TWICE A DAY, Disp: , Rfl:     Dupilumab (Dupixent) 300 MG/2ML SOPN, Inject 300mg (1 pen) on week 6 then every other week there after for maintenance dose, Disp: 4 mL, Rfl: 10    Dupilumab (Dupixent) 300 MG/2ML SOPN, Inject 600mg (2 pens) on week 0 then 300mg (1pen) on week 2 and week 4 for loading dose. (Patient not taking: Reported on 3/14/2024), Disp: 8 mL, Rfl: 0    fluticasone (FLONASE) 50 mcg/act nasal spray, 2 sprays into each nostril daily, Disp: , Rfl:     hydrOXYzine HCL (ATARAX) 25 mg tablet, Take 1 tablet (25 mg total) by mouth daily at bedtime (Patient not taking: Reported on 3/14/2024), Disp: 30 tablet, Rfl: 0    ketoconazole (NIZORAL) 2 % shampoo, ketoconazole 2 % shampoo  SHAMPOO HAIR  2-3 TIMES A WEEK (Patient not taking: Reported on 11/29/2023), Disp: , Rfl:     Multiple Vitamin (MULTIVITAMIN ADULT PO), multivitamin, Disp: , Rfl:     mupirocin (BACTROBAN) 2 % ointment, mupirocin 2 % topical ointment  APPLY INTRANASALLY TWICE A DAY  as directed (Patient not taking: Reported on 10/4/2023), Disp: , Rfl:     solifenacin (VESICARE) 10 MG tablet, Take 10 mg by mouth daily, Disp: , Rfl:     tamsulosin (FLOMAX) 0.4 mg, TAKE 1 CAPSULE BY MOUTH EVERY DAY AT NIGHT, Disp: , Rfl:           Whom besides the patient is providing clinical information about today's encounter?   NO ADDITIONAL HISTORIAN (patient alone provided history)    Physical Exam and Assessment/Plan by Diagnosis:      FOLLOW UP ATOPIC DERMATITIS (\"ECZEMA\")    Physical Exam:  Anatomic Location:  Clear on exam  Body Surface Area at Today's Visit (patient's own palm = ~1% BSA): 0%  Global Assessment of Severity:  CLEAR:  No inflammatory signs of atopic dermatitis (no erythema, no induration/papulation, no lichenification, no oozing/crusting).  Post-inflammatory hyper- or hypo-pigmentation " "may be present.  Pertinent Positives: We've discussed possibly weaning off Dupixent in a year depending on progress of eczema. Patient would be open to phototherapy if needed. Also, discussed, if needed we would consider oral prednisone for severe flare.   Pertinent Negatives:  Suspected SUPERINFECTION (erythema, oozing, and/or crusting is present)?: No    Additional History of Present Condition:  Patient injecting Dupixent 300 mg every other week. Patient has Clobetasol 0.05% cream but has not needed to use since mid April. Applies Cerave after each shower. Patient did try an elimination diet in April and slowly re introduced dairy, sugar, gluten, corn, wheat tamsulosin was changed to Alfuzosin  but does not think it was the cause of his rash. Patient has been outside more since April and wondering if that has contributed to him being better. Patient currently not on Prednisone. Currently Dupixent auth valid through 7/9/24. Patient would be open to phototherapy if he worsens at any point.        TODAY'S PLAN:     PRESCRIPTION MANAGEMENT:  We discussed that treatment often begins with topical steroids and topical calcineurin inhibitors; topical SYLVIA-inhibitors are emerging as potentially useful.  Systemic therapy with oral corticosteroids such as prednisone or SYLVIA-inhibitors or Dupixent (dupilumab) may also be indicated.  Side effects of these medications were discussed.    Skin Hygiene:      Recommend using only mild cleansers (hypoallergenic and without fragrances) and fragrance free detergent (not \"unscented\" products which contain a masking agent); we discussed avoiding irritants/fragranced products.  Encourage regular use of a humidifier to increase humidity and help prevent water loss.  At least 3 times day whole-body application using a good moisturizer such as Cerave.      Topical Management:      Clobetasol 0.05% cream FLARE TREATMENT:  Apply a thin layer TWICE A DAY to affected areas of skin for no more " "than 2 weeks straight. Do not apply to face, underarms or genitals unless directed.      Intensive Therapy:      NONE      Systemic Strategies:      DUPIXENT  (Dupilumab)  ADULT PATIENT.  Dosage is NOT weight-based.  LOADING DOSE and MAINTENANCE DOSE are required.  ADULT MAINTENANCE DOSE:  300 mg.  Select Epic ORDER:  \"Dupixent 300mg/2mL SC SOPN\"  Select \"FREE TEXT\".  Dispense:  2 mL.  Refill:  25.  SIG:  MAINTENANCE DOSE:  Give one 300 mg injection EVERY 2 WEEKS as instructed.  Side effects and warnings discussed.  Reviewed how to clean injections sites properly and how to change location of injection sites regularly.  Self-injecting pen (identified as \"SOPN\" in Epic) is preferred.  Patient should be seen by prescribing dermatologist at least every 6 months for follow-up.      Investigations: NONE      MEDICAL DECISION MAKING  Treatment Goal:  Resolution of the CHRONIC condition.       Chronic condition is currently at treatment goal.                 Scribe Attestation      I,:  Catherine Riley am acting as a scribe while in the presence of the attending physician.:       I,:  Maria D Mansfield MD personally performed the services described in this documentation    as scribed in my presence.:            "

## 2024-05-21 NOTE — PATIENT INSTRUCTIONS
"  FOLLOW UP ATOPIC DERMATITIS (\"ECZEMA\")    Physical Exam:  Anatomic Location: Clear on exam  Body Surface Area at Today's Visit (patient's own palm = ~1% BSA): 0%  Global Assessment of Severity:  CLEAR:  No inflammatory signs of atopic dermatitis (no erythema, no induration/papulation, no lichenification, no oozing/crusting).  Post-inflammatory hyper- or hypo-pigmentation may be present.  Pertinent Positives:  Pertinent Negatives:  Suspected SUPERINFECTION (erythema, oozing, and/or crusting is present)?: No    Additional History of Present Condition:  Patient injecting Dupixent 300 mg every other week. Patient has Clobetasol 0.05% cream but has not needed to use since mid April. Applies Cerave after each shower. Patient did try an elimination diet in April and slowly re introduced dairy, sugar, gluten, corn, wheat tamsulosin was changed to Alfuzosin  but does not think it was the cause of his rash. Patient has been outside more since April and wondering if that has contributed to him being better. Patient currently not on Prednisone. Currently Dupixent auth valid through 7/9/24. Patient would be open to phototherapy if he worsens at any point.        TODAY'S PLAN:     PRESCRIPTION MANAGEMENT:  We discussed that treatment often begins with topical steroids and topical calcineurin inhibitors; topical SYLVIA-inhibitors are emerging as potentially useful.  Systemic therapy with oral corticosteroids such as prednisone or SYLVIA-inhibitors or Dupixent (dupilumab) may also be indicated.  Side effects of these medications were discussed.    Skin Hygiene:      Recommend using only mild cleansers (hypoallergenic and without fragrances) and fragrance free detergent (not \"unscented\" products which contain a masking agent); we discussed avoiding irritants/fragranced products.  Encourage regular use of a humidifier to increase humidity and help prevent water loss.  At least 3 times day whole-body application using a good moisturizer " "such as Cerave.      Topical Management:      Clobetasol 0.05% cream FLARE TREATMENT:  Apply a thin layer TWICE A DAY to affected areas of skin for no more than 2 weeks straight. Do not apply to face, underarms or genitals unless directed.      Intensive Therapy:      NONE      Systemic Strategies:      DUPIXENT  (Dupilumab)  ADULT PATIENT.  Dosage is NOT weight-based.  LOADING DOSE and MAINTENANCE DOSE are required.  ADULT MAINTENANCE DOSE:  300 mg.  Select Epic ORDER:  \"Dupixent 300mg/2mL SC SOPN\"  Select \"FREE TEXT\".  Dispense:  2 mL.  Refill:  25.  SIG:  MAINTENANCE DOSE:  Give one 300 mg injection EVERY 2 WEEKS as instructed.  Side effects and warnings discussed.  Reviewed how to clean injections sites properly and how to change location of injection sites regularly.  Self-injecting pen (identified as \"SOPN\" in Epic) is preferred.  Patient should be seen by prescribing dermatologist at least every 6 months for follow-up.      Investigations: NONE      MEDICAL DECISION MAKING  Treatment Goal:  Resolution of the CHRONIC condition.       Chronic condition is currently at treatment goal.  We've discussed possibly weaning off Dupixent in a year depending on progress of eczema. Patient would be open to phototherapy if needed. Also, discussed, if needed we would consider oral prednisone for severe flare.         "

## 2024-06-12 ENCOUNTER — TELEPHONE (OUTPATIENT)
Dept: DERMATOLOGY | Facility: CLINIC | Age: 64
End: 2024-06-12

## 2024-06-12 DIAGNOSIS — L20.9 ATOPIC DERMATITIS IN ADULT: ICD-10-CM

## 2024-06-12 RX ORDER — DUPILUMAB 300 MG/2ML
INJECTION, SOLUTION SUBCUTANEOUS
Qty: 4 ML | Refills: 11 | Status: SHIPPED | OUTPATIENT
Start: 2024-06-12 | End: 2024-06-18 | Stop reason: SDUPTHER

## 2024-06-13 NOTE — TELEPHONE ENCOUNTER
PA for Dupixent 300mg pen      Submitted via    [x]CMM-KEY [id number]  [][employer]-Case ID #   []Faxed to plan   []Other website   []Phone call Case ID #     Office notes sent, clinical questions answered. Awaiting determination    Turnaround time for your insurance to make a decision on your Prior Authorization can take 7-21 business days.

## 2024-06-18 RX ORDER — DUPILUMAB 300 MG/2ML
INJECTION, SOLUTION SUBCUTANEOUS
Qty: 4 ML | Refills: 11 | Status: SHIPPED | OUTPATIENT
Start: 2024-06-18

## 2024-06-18 NOTE — TELEPHONE ENCOUNTER
PA for Dupixent 300mg pen  Approved     Date(s) approved 04/13/2024 - 06/12/2025    Case #[id number]    Patient advised by          [] MyChart Message  [x] Phone call- Patient notified by pharmacy.   []LMOM  []L/M to call office as no active Communication consent on file  []Unable to leave detailed message as VM not approved on Communication consent       Pharmacy advised by    [x]Fax  []Phone call    Approval letter scanned into [employer] Yes

## 2024-11-29 ENCOUNTER — OFFICE VISIT (OUTPATIENT)
Dept: PHYSICAL THERAPY | Facility: CLINIC | Age: 64
End: 2024-11-29
Payer: COMMERCIAL

## 2024-11-29 DIAGNOSIS — M25.512 ACUTE PAIN OF LEFT SHOULDER: Primary | ICD-10-CM

## 2024-11-29 DIAGNOSIS — Z98.890 S/P LEFT ROTATOR CUFF REPAIR: ICD-10-CM

## 2024-11-29 PROCEDURE — 97110 THERAPEUTIC EXERCISES: CPT | Performed by: PHYSICAL THERAPIST

## 2024-11-29 PROCEDURE — 97161 PT EVAL LOW COMPLEX 20 MIN: CPT | Performed by: PHYSICAL THERAPIST

## 2024-11-29 NOTE — LETTER
2024    Darian Glover DO  [address] [facility] [address]    Patient: Edmond Ulrich   YOB: 1960   Date of Visit: 2024     Encounter Diagnosis     ICD-10-CM    1. Acute pain of left shoulder  M25.512       2. S/P left rotator cuff repair  Z98.890           Dear Dr. Glover:    Thank you for your recent referral of Edmond Ulrich. Please review the attached evaluation summary from Edmond's recent visit.     Please verify that you agree with the plan of care by signing the attached order.     If you have any questions or concerns, please do not hesitate to call.     I sincerely appreciate the opportunity to share in the care of one of your patients and hope to have another opportunity to work with you in the near future.       Sincerely,    Timur Hutton, PT      Referring Provider:      I certify that I have read the below Plan of Care and certify the need for these services furnished under this plan of treatment while under my care.                    Darian Glover DO  [address] [facility] [address]  Via Fax: [phone]          PT Evaluation     Today's date: 2024  Patient name: Edmond Ulrich  : 1960  MRN: [id number]  Referring provider: Darian Glover DO  Dx:   Encounter Diagnosis     ICD-10-CM    1. Acute pain of left shoulder  M25.512       2. S/P left rotator cuff repair  Z98.890                      Assessment    Assessment details: Patient is a 64 year old male presenting to physical therapy s/p L RCR with DCE from 24.  Patient's post op instructions from his surgeon were reviewed today along with discussions regarding wound care, dressing, hygiene, activity limitations, and protocol education.  Patient expressed a verbal understanding and also was able to demonstrate appropriate donning and doffing of the sling.  His HEP was also reviewed.  Patient during PROM did have an empty end feel.  Patient would benefit  from skilled physical therapy services for prescribed exercises, manual interventions, neuromuscular re-education, education, and modalities as deemed appropriate to assist patient in achieving their maximum level of function.    Goals  STG - 4 weeks  Decrease subjective pain by 2 weeks  Able to achieve 90* of shoulder passive flexion  LTG - 24 weeks  Able to perform overhead lifting without functional limitation  Able to complete dressing activities without functional limitation  Able to resume recreational exercise without functional limitation  Able to manage symptoms independently     Plan  Patient would benefit from: skilled physical therapy    Planned therapy interventions: manual therapy, therapeutic exercise, activity modification, neuromuscular re-education, therapeutic activities, home exercise program and IADL retraining    Frequency: 2x week  Duration in weeks: 24        Subjective Evaluation    History of Present Illness  Mechanism of injury: Patient underwent a L RCR with DCE on 24.  He denies having complications from the procedure.  He did have a nerve block which he feels has worn off as sensation has returned to the upper extremity.  He is RHD.  Patient denies having any fevers, night sweats, or chills at this time.  He is an active individual who enjoys golfing and cycling, he wishes to return to these activities.    Patient Goals  Patient goals for therapy: decreased pain and return to sport/leisure activities    Pain  Current pain ratin  At worst pain ratin          Objective     Neurological Testing     Sensation     Shoulder   Left Shoulder   Intact: light touch    Passive Range of Motion   Left Shoulder   Flexion: 50 degrees     Additional Passive Range of Motion Details  Empty end feel    Strength/Myotome Testing     Additional Strength Details  No MMT secondary to surgery    General Comments:      Shoulder Comments   Discussed donning and doffing sling, incision care, HEP,  dressing, showering, and icing with patient and wife.              Precautions: Follow protocol L RCR 11/2724    POC expires Unit limit Auth Expiration date PT/OT/ST + Visit Limit?   5/24/25 6 12/31 52                           Visit/Unit Tracking  AUTH Status:  Date 11/29               Used 1               Remaining  51                    Manuals 11/29            Shoulder PROM FE today                                                   Neuro Re-Ed                                                                                                        Ther Ex             Education HEP, sling, wound care, restrictions, expectations with therapy.                                                                                                        Ther Activity                                       Gait Training                                       Modalities

## 2024-11-29 NOTE — PROGRESS NOTES
PT Evaluation     Today's date: 2024  Patient name: Edmond Ulrich  : 1960  MRN: [id number]  Referring provider: Darian Glover DO  Dx:   Encounter Diagnosis     ICD-10-CM    1. Acute pain of left shoulder  M25.512       2. S/P left rotator cuff repair  Z98.890                      Assessment    Assessment details: Patient is a 64 year old male presenting to physical therapy s/p L RCR with DCE from 24.  Patient's post op instructions from his surgeon were reviewed today along with discussions regarding wound care, dressing, hygiene, activity limitations, and protocol education.  Patient expressed a verbal understanding and also was able to demonstrate appropriate donning and doffing of the sling.  His HEP was also reviewed.  Patient during PROM did have an empty end feel.  Patient would benefit from skilled physical therapy services for prescribed exercises, manual interventions, neuromuscular re-education, education, and modalities as deemed appropriate to assist patient in achieving their maximum level of function.    Goals  STG - 4 weeks  Decrease subjective pain by 2 weeks  Able to achieve 90* of shoulder passive flexion  LTG - 24 weeks  Able to perform overhead lifting without functional limitation  Able to complete dressing activities without functional limitation  Able to resume recreational exercise without functional limitation  Able to manage symptoms independently     Plan  Patient would benefit from: skilled physical therapy    Planned therapy interventions: manual therapy, therapeutic exercise, activity modification, neuromuscular re-education, therapeutic activities, home exercise program and IADL retraining    Frequency: 2x week  Duration in weeks: 24        Subjective Evaluation    History of Present Illness  Mechanism of injury: Patient underwent a L RCR with DCE on 24.  He denies having complications from the procedure.  He did have a nerve block which he feels has  worn off as sensation has returned to the upper extremity.  He is RHD.  Patient denies having any fevers, night sweats, or chills at this time.  He is an active individual who enjoys golfing and cycling, he wishes to return to these activities.    Patient Goals  Patient goals for therapy: decreased pain and return to sport/leisure activities    Pain  Current pain ratin  At worst pain ratin          Objective     Neurological Testing     Sensation     Shoulder   Left Shoulder   Intact: light touch    Passive Range of Motion   Left Shoulder   Flexion: 50 degrees     Additional Passive Range of Motion Details  Empty end feel    Strength/Myotome Testing     Additional Strength Details  No MMT secondary to surgery    General Comments:      Shoulder Comments   Discussed donning and doffing sling, incision care, HEP, dressing, showering, and icing with patient and wife.              Precautions: Follow protocol L RCR 2724    POC expires Unit limit Auth Expiration date PT/OT/ST + Visit Limit?   25 6  52                           Visit/Unit Tracking  AUTH Status:  Date                Used 1               Remaining  51                    Manuals             Shoulder PROM FE today                                                   Neuro Re-Ed                                                                                                        Ther Ex             Education HEP, sling, wound care, restrictions, expectations with therapy.                                                                                                        Ther Activity                                       Gait Training                                       Modalities

## 2024-12-02 ENCOUNTER — OFFICE VISIT (OUTPATIENT)
Dept: PHYSICAL THERAPY | Facility: CLINIC | Age: 64
End: 2024-12-02
Payer: COMMERCIAL

## 2024-12-02 DIAGNOSIS — M25.512 ACUTE PAIN OF LEFT SHOULDER: Primary | ICD-10-CM

## 2024-12-02 DIAGNOSIS — Z98.890 S/P LEFT ROTATOR CUFF REPAIR: ICD-10-CM

## 2024-12-02 PROCEDURE — 97110 THERAPEUTIC EXERCISES: CPT | Performed by: PHYSICAL THERAPIST

## 2024-12-02 NOTE — PROGRESS NOTES
Daily Note     Today's date: 2024  Patient name: Edmond Ulrich  : 1960  MRN: [id number]  Referring provider: Darian Glover DO  Dx:   Encounter Diagnosis     ICD-10-CM    1. Acute pain of left shoulder  M25.512       2. S/P left rotator cuff repair  Z98.890                      Subjective: Patient reports compliance with sling and post op instructions       Objective: See treatment diary below      Assessment: Empty end feel noted, PROM limited only because of post op limitations.  Continue to progress as able.  No pain or soreness noted post intervention.        Plan: Continue per plan of care.      Precautions: Follow protocol L RCR 2724    POC expires Unit limit Auth Expiration date PT/OT/ST + Visit Limit?   25 6  52                           Visit/Unit Tracking  AUTH Status:  Date                Used 1               Remaining  51                    Manuals            Shoulder PROM FE today GM                                                  Neuro Re-Ed                                                                                                        Ther Ex             Education HEP, sling, wound care, restrictions, expectations with therapy.             Elbow AROM  30x                                                                                         Ther Activity                                       Gait Training                                       Modalities

## 2024-12-04 ENCOUNTER — OFFICE VISIT (OUTPATIENT)
Dept: PHYSICAL THERAPY | Facility: CLINIC | Age: 64
End: 2024-12-04
Payer: COMMERCIAL

## 2024-12-04 DIAGNOSIS — Z98.890 S/P LEFT ROTATOR CUFF REPAIR: ICD-10-CM

## 2024-12-04 DIAGNOSIS — M25.512 ACUTE PAIN OF LEFT SHOULDER: Primary | ICD-10-CM

## 2024-12-04 PROCEDURE — 97110 THERAPEUTIC EXERCISES: CPT | Performed by: PHYSICAL THERAPIST

## 2024-12-04 NOTE — PROGRESS NOTES
Daily Note     Today's date: 2024  Patient name: Edmond Ulrich  : 1960  MRN: [id number]  Referring provider: Darian Glover DO  Dx:   Encounter Diagnosis     ICD-10-CM    1. Acute pain of left shoulder  M25.512       2. S/P left rotator cuff repair  Z98.890                      Subjective: Patient reports that he has not needed to take his pain medication.        Objective: See treatment diary below      Assessment: Empty end feel with PROM remains, limited secondary to protocol.  No complaints of pain during PROM. Instructed patient on pendulum with good response today and good technique.  Progress as able.        Plan: Continue per plan of care.      Precautions: Follow protocol L RCR 2724    POC expires Unit limit Auth Expiration date PT/OT/ST + Visit Limit?   25 6  52                           Visit/Unit Tracking  AUTH Status:  Date                Used 1               Remaining  51                    Manuals           Shoulder PROM FE today GM                                                  Neuro Re-Ed                                                                                                        Ther Ex             Education HEP, sling, wound care, restrictions, expectations with therapy.   GM - discussed sling,cardiovascular exercise, use of stationary bike, falls          Elbow AROM  30x 30x          Pendulums   30x                                                                           Ther Activity                                       Gait Training                                       Modalities

## 2024-12-11 ENCOUNTER — OFFICE VISIT (OUTPATIENT)
Dept: PHYSICAL THERAPY | Facility: CLINIC | Age: 64
End: 2024-12-11
Payer: COMMERCIAL

## 2024-12-11 DIAGNOSIS — Z98.890 S/P LEFT ROTATOR CUFF REPAIR: ICD-10-CM

## 2024-12-11 DIAGNOSIS — M25.512 ACUTE PAIN OF LEFT SHOULDER: Primary | ICD-10-CM

## 2024-12-11 PROCEDURE — 97112 NEUROMUSCULAR REEDUCATION: CPT | Performed by: PHYSICAL THERAPIST

## 2024-12-11 PROCEDURE — 97110 THERAPEUTIC EXERCISES: CPT | Performed by: PHYSICAL THERAPIST

## 2024-12-11 NOTE — PROGRESS NOTES
"Daily Note     Today's date: 2024  Patient name: Edmond Ulrich  : 1960  MRN: [id number]  Referring provider: Darian Glover DO  Dx:   Encounter Diagnosis     ICD-10-CM    1. Acute pain of left shoulder  M25.512       2. S/P left rotator cuff repair  Z98.890                      Subjective: Patient reports he is doing well, denies having any symptom exacerbation.        Objective: See treatment diary below      Assessment: Continues to have empty end feel during PROM with ROM limitations secondary to protocol limit.  Per protocol, initiated scapular isometrics and deltoid isometrics.  Continue to progress as able.        Plan: Continue per plan of care.      Precautions: Follow protocol L RCR 2724    POC expires Unit limit Auth Expiration date PT/OT/ST + Visit Limit?   25 6  52                           Visit/Unit Tracking  AUTH Status:  Date                Used 1               Remaining  51                    Manuals          Shoulder PROM FE today GM  GM                                                Neuro Re-Ed             Scapular isometrics    20x         Deltoid isometrics    Flx 3\" 15x                                                                          Ther Ex             Education HEP, sling, wound care, restrictions, expectations with therapy.   GM - discussed sling,cardiovascular exercise, use of stationary bike, falls          Elbow AROM  30x 30x 30x         Pendulums   30x 30x                                                                          Ther Activity                                       Gait Training                                       Modalities                                                "

## 2024-12-12 ENCOUNTER — OFFICE VISIT (OUTPATIENT)
Dept: PHYSICAL THERAPY | Facility: CLINIC | Age: 64
End: 2024-12-12
Payer: COMMERCIAL

## 2024-12-12 DIAGNOSIS — Z98.890 S/P LEFT ROTATOR CUFF REPAIR: ICD-10-CM

## 2024-12-12 DIAGNOSIS — M25.512 ACUTE PAIN OF LEFT SHOULDER: Primary | ICD-10-CM

## 2024-12-12 PROCEDURE — 97112 NEUROMUSCULAR REEDUCATION: CPT | Performed by: PHYSICAL THERAPIST

## 2024-12-12 PROCEDURE — 97110 THERAPEUTIC EXERCISES: CPT | Performed by: PHYSICAL THERAPIST

## 2024-12-12 NOTE — PROGRESS NOTES
"Daily Note     Today's date: 2024  Patient name: Edmond Ulrich  : 1960  MRN: [id number]  Referring provider: aDrian Glover DO  Dx:   Encounter Diagnosis     ICD-10-CM    1. Acute pain of left shoulder  M25.512       2. S/P left rotator cuff repair  Z98.890                      Subjective: Patient reports he had some discomfort while performing deltoid isometrics.  He       Objective: See treatment diary below      Assessment: Empty end feel remains during PROM.  Reviewed HEP with good understanding.  Continue to progress as able.        Plan: Continue per plan of care.      Precautions: Follow protocol L RCR 2724    POC expires Unit limit Auth Expiration date PT/OT/ST + Visit Limit?   25 6  52                           Visit/Unit Tracking  AUTH Status:  Date                Used 1               Remaining  51                    Manuals         Shoulder PROM FE today GM  GM GM                                               Neuro Re-Ed             Scapular isometrics    20x 20x        Deltoid isometrics    Flx 3\" 15x 8x                                                                         Ther Ex             Education HEP, sling, wound care, restrictions, expectations with therapy.   GM - discussed sling,cardiovascular exercise, use of stationary bike, falls          Elbow AROM  30x 30x 30x 30x        Pendulums   30x 30x 30x                                                                         Ther Activity                                       Gait Training                                       Modalities                                                  "

## 2024-12-16 ENCOUNTER — OFFICE VISIT (OUTPATIENT)
Dept: PHYSICAL THERAPY | Facility: CLINIC | Age: 64
End: 2024-12-16
Payer: COMMERCIAL

## 2024-12-16 DIAGNOSIS — Z98.890 S/P LEFT ROTATOR CUFF REPAIR: ICD-10-CM

## 2024-12-16 DIAGNOSIS — M25.512 ACUTE PAIN OF LEFT SHOULDER: Primary | ICD-10-CM

## 2024-12-16 PROCEDURE — 97110 THERAPEUTIC EXERCISES: CPT | Performed by: PHYSICAL THERAPIST

## 2024-12-16 NOTE — PROGRESS NOTES
"Daily Note     Today's date: 2024  Patient name: Edmond Ulrich  : 1960  MRN: [id number]  Referring provider: Darian Glover DO  Dx:   Encounter Diagnosis     ICD-10-CM    1. Acute pain of left shoulder  M25.512       2. S/P left rotator cuff repair  Z98.890                      Subjective: Patient reports that he is doing well, denies any symptom exacerbation.  Did shovel snow today but only used non surgical arm.        Objective: See treatment diary below      Assessment: Continues to have empty end during PROM.  Discussed the importance not moving the arm while in the sling to protect surgical repair.  Continue to progress as able.        Plan: Continue per plan of care.      Precautions: Follow protocol L RCR 2724    POC expires Unit limit Auth Expiration date PT/OT/ST + Visit Limit?   25 6  52                           Visit/Unit Tracking  AUTH Status:  Date                Used 1               Remaining  51                    Manuals        Shoulder PROM FE today GM  GM GM GM                                              Neuro Re-Ed             Scapular isometrics    20x 20x 20x       Deltoid isometrics    Flx 3\" 15x 8x                                                                         Ther Ex             Education HEP, sling, wound care, restrictions, expectations with therapy.   GM - discussed sling,cardiovascular exercise, use of stationary bike, falls   GM - use of arm and sling        Elbow AROM  30x 30x 30x 30x 30x       Pendulums   30x 30x 30x 30x                                                                        Ther Activity                                       Gait Training                                       Modalities                                                    "

## 2024-12-18 ENCOUNTER — OFFICE VISIT (OUTPATIENT)
Dept: PHYSICAL THERAPY | Facility: CLINIC | Age: 64
End: 2024-12-18
Payer: COMMERCIAL

## 2024-12-18 DIAGNOSIS — M25.512 ACUTE PAIN OF LEFT SHOULDER: Primary | ICD-10-CM

## 2024-12-18 DIAGNOSIS — Z98.890 S/P LEFT ROTATOR CUFF REPAIR: ICD-10-CM

## 2024-12-18 PROCEDURE — 97110 THERAPEUTIC EXERCISES: CPT | Performed by: PHYSICAL THERAPIST

## 2024-12-18 NOTE — PROGRESS NOTES
"Daily Note     Today's date: 2024  Patient name: Edmond Ulrich  : 1960  MRN: [id number]  Referring provider: Darian Glover DO  Dx:   Encounter Diagnosis     ICD-10-CM    1. Acute pain of left shoulder  M25.512       2. S/P left rotator cuff repair  Z98.890                      Subjective: Continues to do well, no new complaints       Objective: See treatment diary below      Assessment: Patient was able to tolerate PROM without discomfort or pain.  Better tolerance today for deltoid isometrics.        Plan: Continue per plan of care.      Precautions: Follow protocol L RCR 2724    POC expires Unit limit Auth Expiration date PT/OT/ST + Visit Limit?   25 6  52                           Visit/Unit Tracking  AUTH Status:  Date                Used 1               Remaining  51                    Manuals       Shoulder PROM FE today GM  GM GM GM GM                                             Neuro Re-Ed             Scapular isometrics    20x 20x 20x 20x      Deltoid isometrics    Flx 3\" 15x 8x  5\"x15                                                                       Ther Ex             Education HEP, sling, wound care, restrictions, expectations with therapy.   GM - discussed sling,cardiovascular exercise, use of stationary bike, falls   GM - use of arm and sling  GM      Elbow AROM  30x 30x 30x 30x 30x 30      Pendulums   30x 30x 30x 30x 30                                                                       Ther Activity                                       Gait Training                                       Modalities                                                      "

## 2024-12-23 ENCOUNTER — APPOINTMENT (OUTPATIENT)
Dept: PHYSICAL THERAPY | Facility: CLINIC | Age: 64
End: 2024-12-23
Payer: COMMERCIAL

## 2024-12-26 ENCOUNTER — OFFICE VISIT (OUTPATIENT)
Dept: PHYSICAL THERAPY | Facility: CLINIC | Age: 64
End: 2024-12-26
Payer: COMMERCIAL

## 2024-12-26 DIAGNOSIS — Z98.890 S/P LEFT ROTATOR CUFF REPAIR: ICD-10-CM

## 2024-12-26 DIAGNOSIS — M25.512 ACUTE PAIN OF LEFT SHOULDER: Primary | ICD-10-CM

## 2024-12-26 PROCEDURE — 97110 THERAPEUTIC EXERCISES: CPT | Performed by: PHYSICAL THERAPIST

## 2024-12-26 NOTE — PROGRESS NOTES
"Daily Note     Today's date: 2024  Patient name: Edmond Ulrich  : 1960  MRN: [id number]  Referring provider: Darian Glover DO  Dx:   Encounter Diagnosis     ICD-10-CM    1. Acute pain of left shoulder  M25.512       2. S/P left rotator cuff repair  Z98.890                      Subjective: Patient offers no new complaints       Objective: See treatment diary below      Assessment: Initially he had some tightness in the upper extremity but with stretching, tightness did improve. Continue to progress per protocol.        Plan: Continue per plan of care.      Precautions: Follow protocol L RCR 2724    POC expires Unit limit Auth Expiration date PT/OT/ST + Visit Limit?   25 6  52                           Visit/Unit Tracking  AUTH Status:  Date                Used 1               Remaining  51                    Manuals      Shoulder PROM FE today GM  GM GM GM GM GM                                            Neuro Re-Ed             Scapular isometrics    20x 20x 20x 20x 20x     Deltoid isometrics    Flx 3\" 15x 8x  5\"x15 5\" 15x                                                                      Ther Ex             Education HEP, sling, wound care, restrictions, expectations with therapy.   GM - discussed sling,cardiovascular exercise, use of stationary bike, falls   GM - use of arm and sling  GM GM     Elbow AROM  30x 30x 30x 30x 30x 30      Pendulums   30x 30x 30x 30x 30 30xea                                                                      Ther Activity                                       Gait Training                                       Modalities                                                        "

## 2024-12-30 ENCOUNTER — OFFICE VISIT (OUTPATIENT)
Dept: PHYSICAL THERAPY | Facility: CLINIC | Age: 64
End: 2024-12-30
Payer: COMMERCIAL

## 2024-12-30 DIAGNOSIS — Z98.890 S/P LEFT ROTATOR CUFF REPAIR: ICD-10-CM

## 2024-12-30 DIAGNOSIS — M25.512 ACUTE PAIN OF LEFT SHOULDER: Primary | ICD-10-CM

## 2024-12-30 PROCEDURE — 97110 THERAPEUTIC EXERCISES: CPT | Performed by: PHYSICAL THERAPIST

## 2024-12-30 NOTE — PROGRESS NOTES
"Daily Note     Today's date: 2024  Patient name: Edmond Ulrich  : 1960  MRN: [id number]  Referring provider: Darian Glover DO  Dx:   Encounter Diagnosis     ICD-10-CM    1. Acute pain of left shoulder  M25.512       2. S/P left rotator cuff repair  Z98.890                      Subjective: Patient reports that he is doing well, denies having any symptom exacerbation.       Objective: See treatment diary below      Assessment: PROM continues to progress.  Does have some tightness at end range PROM in all planes but progressing well.        Plan: Continue per plan of care.      Precautions: Follow protocol L RCR 2724    POC expires Unit limit Auth Expiration date PT/OT/ST + Visit Limit?   25 6  52                           Visit/Unit Tracking  AUTH Status:  Date                Used 1               Remaining  51                    Manuals     Shoulder PROM FE today GM  GM GM GM GM GM                                            Neuro Re-Ed             Scapular isometrics    20x 20x 20x 20x 20x 20x    Deltoid isometrics    Flx 3\" 15x 8x  5\"x15 5\" 15x 15x                                                                     Ther Ex             Education HEP, sling, wound care, restrictions, expectations with therapy.   GM - discussed sling,cardiovascular exercise, use of stationary bike, falls   GM - use of arm and sling  GM GM GM    Elbow AROM  30x 30x 30x 30x 30x 30      Pendulums   30x 30x 30x 30x 30 30xea 30x                                                                     Ther Activity                                       Gait Training                                       Modalities                                                          "

## 2025-01-07 ENCOUNTER — TELEPHONE (OUTPATIENT)
Dept: DERMATOLOGY | Facility: CLINIC | Age: 65
End: 2025-01-07

## 2025-01-07 DIAGNOSIS — L20.9 ATOPIC DERMATITIS IN ADULT: Primary | ICD-10-CM

## 2025-01-08 ENCOUNTER — OFFICE VISIT (OUTPATIENT)
Dept: PHYSICAL THERAPY | Facility: CLINIC | Age: 65
End: 2025-01-08
Payer: COMMERCIAL

## 2025-01-08 DIAGNOSIS — M25.512 ACUTE PAIN OF LEFT SHOULDER: Primary | ICD-10-CM

## 2025-01-08 DIAGNOSIS — Z98.890 S/P LEFT ROTATOR CUFF REPAIR: ICD-10-CM

## 2025-01-08 PROCEDURE — 97110 THERAPEUTIC EXERCISES: CPT | Performed by: PHYSICAL THERAPIST

## 2025-01-08 NOTE — PROGRESS NOTES
"Daily Note     Today's date: 2025  Patient name: Edmond Ulrich  : 1960  MRN: [id number]  Referring provider: Darian Glover DO  Dx:   Encounter Diagnosis     ICD-10-CM    1. Acute pain of left shoulder  M25.512       2. S/P left rotator cuff repair  Z98.890                      Subjective: Patient is doing well, now has sling removed.  Sleeping better since removing sling      Objective: See treatment diary below      Assessment: Initiated more AAROM and AROM with good tolerance.  Discussed the importance of not overdoing it with patient expressing understanding.  Limited in beach chair position with AAROM secondary to weakness.        Plan: Continue per plan of care.      Precautions: Follow protocol L RCR 2724    POC expires Unit limit Auth Expiration date PT/OT/ST + Visit Limit?   25 6  52                           Visit/Unit Tracking  AUTH Status:  Date                Used 1               Remaining  51                    Manuals    Shoulder PROM FE today GM  GM GM GM GM GM  GM                                          Neuro Re-Ed             Scapular isometrics    20x 20x 20x 20x 20x 20x    Deltoid isometrics    Flx 3\" 15x 8x  5\"x15 5\" 15x 15x                                                                     Ther Ex             Education HEP, sling, wound care, restrictions, expectations with therapy.   GM - discussed sling,cardiovascular exercise, use of stationary bike, falls   GM - use of arm and sling  GM GM GM    Elbow AROM  30x 30x 30x 30x 30x 30      Pendulums   30x 30x 30x 30x 30 30xea 30x    Pball roll out          30x flx and scaption   Beach chair progression          2x10   Pball up wall          20x                             Ther Activity                                       Gait Training                                       Modalities                                                            "

## 2025-01-09 ENCOUNTER — APPOINTMENT (OUTPATIENT)
Dept: PHYSICAL THERAPY | Facility: CLINIC | Age: 65
End: 2025-01-09
Payer: COMMERCIAL

## 2025-01-13 ENCOUNTER — OFFICE VISIT (OUTPATIENT)
Dept: PHYSICAL THERAPY | Facility: CLINIC | Age: 65
End: 2025-01-13
Payer: COMMERCIAL

## 2025-01-13 DIAGNOSIS — Z98.890 S/P LEFT ROTATOR CUFF REPAIR: ICD-10-CM

## 2025-01-13 DIAGNOSIS — M25.512 ACUTE PAIN OF LEFT SHOULDER: Primary | ICD-10-CM

## 2025-01-13 PROCEDURE — 97110 THERAPEUTIC EXERCISES: CPT | Performed by: PHYSICAL THERAPIST

## 2025-01-13 NOTE — PROGRESS NOTES
"Daily Note     Today's date: 2025  Patient name: Edmond Ulrich  : 1960  MRN: [id number]  Referring provider: Darian Glover DO  Dx:   Encounter Diagnosis     ICD-10-CM    1. Acute pain of left shoulder  M25.512       2. S/P left rotator cuff repair  Z98.890                      Subjective: Patient reports that overall he is doing well.  Notes that he slept 7 hours yesterday.        Objective: See treatment diary below      Assessment: Patient continues to make progress with AROM and AAROM.  Initiated more AAROM and AROM today with good response.  During flexion AROM, he does demonstrate an upper trap compensation.  Updated HEP for patient.       Plan: Continue per plan of care.      Precautions: Follow protocol L RCR 2724    Access Code: [email or web address]  URL: [email or web address]  Date: 2025  Prepared by: Timur Hutton    POC expires Unit limit Auth Expiration date PT/OT/ST + Visit Limit?   25 6  52                           Visit/Unit Tracking  AUTH Status:  Date                Used 1               Remaining  51                    Manuals    Shoulder PROM FE today GM  GM GM GM GM GM  GM                                          Neuro Re-Ed             Scapular isometrics    20x 20x 20x 20x 20x 20x    Deltoid isometrics    Flx 3\" 15x 8x  5\"x15 5\" 15x 15x    LT ER          20x                                                       Ther Ex             Education HEP, sling, wound care, restrictions, expectations with therapy.   GM - discussed sling,cardiovascular exercise, use of stationary bike, falls   GM - use of arm and sling  GM GM GM GM   Elbow AROM  30x 30x 30x 30x 30x 30      Pendulums   30x 30x 30x 30x 30 30xea 30x    Pball roll out          30x flx and scaption   Beach chair progression          2x10   Pball up wall             Standing shoulder flexion          20x   SL ER          20x   Pulleys          " "10\"X10   Prone shoulder extension          20x   Ther Activity                                       Gait Training                                       Modalities                                                              "

## 2025-01-15 ENCOUNTER — OFFICE VISIT (OUTPATIENT)
Dept: PHYSICAL THERAPY | Facility: CLINIC | Age: 65
End: 2025-01-15
Payer: COMMERCIAL

## 2025-01-15 DIAGNOSIS — M25.512 ACUTE PAIN OF LEFT SHOULDER: Primary | ICD-10-CM

## 2025-01-15 DIAGNOSIS — Z98.890 S/P LEFT ROTATOR CUFF REPAIR: ICD-10-CM

## 2025-01-15 PROCEDURE — 97112 NEUROMUSCULAR REEDUCATION: CPT | Performed by: PHYSICAL THERAPIST

## 2025-01-15 PROCEDURE — 97110 THERAPEUTIC EXERCISES: CPT | Performed by: PHYSICAL THERAPIST

## 2025-01-15 NOTE — PROGRESS NOTES
"Daily Note     Today's date: 1/15/2025  Patient name: Edmond Ulrich  : 1960  MRN: [id number]  Referring provider: Darian Glover DO  Dx:   Encounter Diagnosis     ICD-10-CM    1. Acute pain of left shoulder  M25.512       2. S/P left rotator cuff repair  Z98.890                      Subjective: Patient continues to report that he is doing well and progressing.        Objective: See treatment diary below      Assessment: Challenged with AROM secondary to weakness.  In a gravity minimized position, he demonstrates good motor control of the upper extremity and better overall range of motion.        Plan: Continue per plan of care.      Precautions: Follow protocol L R 2724    Access Code: [email or web address]  URL: [email or web address]  Date: 2025  Prepared by: Timur Hutton    POC expires Unit limit Auth Expiration date PT/OT/ST + Visit Limit?   25 6  52                           Visit/Unit Tracking  AUTH Status:  Date                Used 1               Remaining  51                    Manuals 11/29 12/2 12/4 12/11 12/12 12/16 12/18 12/26 12/30 1/15   Shoulder PROM FE today GM  GM GM GM GM GM  GM                                          Neuro Re-Ed             Scapular isometrics    20x 20x 20x 20x 20x 20x    Deltoid isometrics    Flx 3\" 15x 8x  5\"x15 5\" 15x 15x    LT ER          20x                                                       Ther Ex             Education HEP, sling, wound care, restrictions, expectations with therapy.   GM - discussed sling,cardiovascular exercise, use of stationary bike, falls   GM - use of arm and sling  GM GM GM GM   Elbow AROM  30x 30x 30x 30x 30x 30      Pendulums   30x 30x 30x 30x 30 30xea 30x    Pball roll out          30x flx and scaption   Beach chair progression          3x10   Pball up wall             Standing shoulder flexion          20x   SL ER          20x   SL ABD          Aarom 20x   Pulleys          10\"X10   Standing cane " flexion          20x   Prone shoulder extension          20x   Ther Activity                                       Gait Training                                       Modalities

## 2025-01-17 ENCOUNTER — PATIENT MESSAGE (OUTPATIENT)
Dept: DERMATOLOGY | Facility: CLINIC | Age: 65
End: 2025-01-17

## 2025-01-17 ENCOUNTER — TELEPHONE (OUTPATIENT)
Dept: DERMATOLOGY | Facility: CLINIC | Age: 65
End: 2025-01-17

## 2025-01-17 NOTE — TELEPHONE ENCOUNTER
Received fax from [facility], regarding [employer]. States they have been unable to reach the patient. Form scanned into chart for review.

## 2025-01-17 NOTE — TELEPHONE ENCOUNTER
Called and spoke with patient. Advised him to give [facility] a call. Patient was confused as he has never used [facility] before. Patient stated he would try calling them.

## 2025-01-20 ENCOUNTER — OFFICE VISIT (OUTPATIENT)
Dept: PHYSICAL THERAPY | Facility: CLINIC | Age: 65
End: 2025-01-20
Payer: COMMERCIAL

## 2025-01-20 ENCOUNTER — TELEPHONE (OUTPATIENT)
Dept: DERMATOLOGY | Facility: CLINIC | Age: 65
End: 2025-01-20

## 2025-01-20 DIAGNOSIS — L20.9 ATOPIC DERMATITIS IN ADULT: Primary | ICD-10-CM

## 2025-01-20 DIAGNOSIS — Z98.890 S/P LEFT ROTATOR CUFF REPAIR: ICD-10-CM

## 2025-01-20 DIAGNOSIS — M25.512 ACUTE PAIN OF LEFT SHOULDER: Primary | ICD-10-CM

## 2025-01-20 PROCEDURE — 97112 NEUROMUSCULAR REEDUCATION: CPT | Performed by: PHYSICAL THERAPIST

## 2025-01-20 PROCEDURE — 97110 THERAPEUTIC EXERCISES: CPT | Performed by: PHYSICAL THERAPIST

## 2025-01-20 NOTE — TELEPHONE ENCOUNTER
Called patient as he had some questions regarding excimer laser. He is concerned because he read the consent form and it stated the possibility of getting skin cancer. Advised patient that current research is not showing increase risk of skin cancer with the type of light we use but I advised him I would double check this information with Dr Mansfield and get back to him. He also was unaware that the excimer laser was at the CV location as he lives in [facility] which is very far. Advised patient I would ask Dr Mansfield if he could do phototherapy instead of excimer laser due to distance and availability. Patient informed of what type of light is used in phototherapy, what the phototherapy rice looks like and how it works. Message sent to Dr Mansfield and will call patient back when she responds

## 2025-01-20 NOTE — PROGRESS NOTES
"Daily Note     Today's date: 2025  Patient name: Edmond Ulrich  : 1960  MRN: [id number]  Referring provider: Darian Glover DO  Dx:   Encounter Diagnosis     ICD-10-CM    1. Acute pain of left shoulder  M25.512       2. S/P left rotator cuff repair  Z98.890                      Subjective: Continues to report compliance with his HEP.  Continues to report that he feels like he is progressing      Objective: See treatment diary below      Assessment: Continues to have improvements in AROM in all planes.  Does still have some weakness with forward elevation in standing position but is able to do this in a beach chair progression.        Plan: Continue per plan of care.      Precautions: Follow protocol L R 2724    Access Code: [email or web address]  URL: [email or web address]  Date: 2025  Prepared by: Timur Hutton    POC expires Unit limit Auth Expiration date PT/OT/ST + Visit Limit?   25 6  52                           Visit/Unit Tracking  AUTH Status:  Date                Used 1               Remaining  51                    Manuals    Shoulder PROM FE today GM  GM GM GM GM GM  GM                                          Neuro Re-Ed             Scapular isometrics    20x 20x 20x 20x 20x 20x    Deltoid isometrics    Flx 3\" 15x 8x  5\"x15 5\" 15x 15x    LT ER          20x                                                       Ther Ex             Education HEP, sling, wound care, restrictions, expectations with therapy.   GM - discussed sling,cardiovascular exercise, use of stationary bike, falls   GM - use of arm and sling  GM GM GM GM   Elbow AROM  30x 30x 30x 30x 30x 30      Pendulums   30x 30x 30x 30x 30 30xea 30x    Pball roll out          30x flx and scaption   Beach chair progression          3x10   Pball up wall             Standing shoulder flexion          20x   SL ER          20x   SL ABD          Aarom 20x " "  Pulleys          10\"X10   Standing cane flexion          20x   Prone shoulder extension          20x   Ther Activity                                       Gait Training                                       Modalities                                                                  "

## 2025-01-23 ENCOUNTER — OFFICE VISIT (OUTPATIENT)
Dept: PHYSICAL THERAPY | Facility: CLINIC | Age: 65
End: 2025-01-23
Payer: COMMERCIAL

## 2025-01-23 DIAGNOSIS — M25.512 ACUTE PAIN OF LEFT SHOULDER: Primary | ICD-10-CM

## 2025-01-23 DIAGNOSIS — Z98.890 S/P LEFT ROTATOR CUFF REPAIR: ICD-10-CM

## 2025-01-23 PROCEDURE — 97112 NEUROMUSCULAR REEDUCATION: CPT | Performed by: PHYSICAL THERAPIST

## 2025-01-23 PROCEDURE — 97110 THERAPEUTIC EXERCISES: CPT | Performed by: PHYSICAL THERAPIST

## 2025-01-23 NOTE — PROGRESS NOTES
"Daily Note     Today's date: 2025  Patient name: Edmond Ulrich  : 1960  MRN: [id number]  Referring provider: Darian Glover DO  Dx:   Encounter Diagnosis     ICD-10-CM    1. Acute pain of left shoulder  M25.512       2. S/P left rotator cuff repair  Z98.890                      Subjective: Patient reports that he continues to do well, continues to feel better       Objective: See treatment diary below      Assessment: Full PROM in all planes.  Does continue to have improvements with AROM, mild upper trap compensation.  Continue to progress as able.        Plan: Continue per plan of care.      Precautions: Follow protocol L R 2724    Access Code: [email or web address]  URL: [email or web address]  Date: 2025  Prepared by: Timur Hutton    POC expires Unit limit Auth Expiration date PT/OT/ST + Visit Limit?   25 6  52                           Visit/Unit Tracking  AUTH Status:  Date                Used 1               Remaining  51                    Manuals    Shoulder PROM FE today GM  GM GM GM GM GM  GM                                          Neuro Re-Ed             Scapular isometrics    20x 20x 20x 20x 20x 20x    Deltoid isometrics    Flx 3\" 15x 8x  5\"x15 5\" 15x 15x    LT ER          20x                                                       Ther Ex             Education HEP, sling, wound care, restrictions, expectations with therapy.   GM - discussed sling,cardiovascular exercise, use of stationary bike, falls   GM - use of arm and sling  GM GM GM GM   Elbow AROM  30x 30x 30x 30x 30x 30      Pendulums   30x 30x 30x 30x 30 30xea 30x    Pball roll out          30x flx and scaption   Beach chair progression          3x10   Pball up wall             Standing shoulder flexion          20x   SL ER          20x   SL ABD          Aarom 20x   Pulleys          10\"X10   Standing cane flexion          20x   Prone shoulder " extension          20x   Ther Activity                                       Gait Training                                       Modalities

## 2025-01-27 ENCOUNTER — OFFICE VISIT (OUTPATIENT)
Dept: PHYSICAL THERAPY | Facility: CLINIC | Age: 65
End: 2025-01-27
Payer: COMMERCIAL

## 2025-01-27 DIAGNOSIS — Z98.890 S/P LEFT ROTATOR CUFF REPAIR: ICD-10-CM

## 2025-01-27 DIAGNOSIS — M25.512 ACUTE PAIN OF LEFT SHOULDER: Primary | ICD-10-CM

## 2025-01-27 PROCEDURE — 97110 THERAPEUTIC EXERCISES: CPT | Performed by: PHYSICAL THERAPIST

## 2025-01-27 PROCEDURE — 97112 NEUROMUSCULAR REEDUCATION: CPT | Performed by: PHYSICAL THERAPIST

## 2025-01-27 NOTE — PROGRESS NOTES
"Daily Note     Today's date: 2025  Patient name: Edmond Ulrich  : 1960  MRN: [id number]  Referring provider: Darian Glover DO  Dx:   Encounter Diagnosis     ICD-10-CM    1. Acute pain of left shoulder  M25.512       2. S/P left rotator cuff repair  Z98.890                      Subjective: Patient reports that he is doing well, denies having any symptom exacerbation.        Objective: See treatment diary below      Assessment: Full PROM for the patient today in all planes.  Actively, limited in ER and IR.  Upper trap compensation is minimal at this time.  Continue to progress periscapular strengthening as able.        Plan: Continue per plan of care.      Precautions: Follow protocol L RCR 2724    Access Code: [email or web address]  URL: [email or web address]  Date: 2025  Prepared by: Timur Hutton    POC expires Unit limit Auth Expiration date PT/OT/ST + Visit Limit?   25 6  52                           Visit/Unit Tracking  AUTH Status:  Date                Used 1               Remaining  51                    Manuals    Shoulder PROM FE today GM  GM GM GM GM GM  GM                                          Neuro Re-Ed             Scapular isometrics    20x 20x 20x 20x 20x 20x    Deltoid isometrics    Flx 3\" 15x 8x  5\"x15 5\" 15x 15x    LT ER          20x                                                       Ther Ex             Education HEP, sling, wound care, restrictions, expectations with therapy.   GM - discussed sling,cardiovascular exercise, use of stationary bike, falls   GM - use of arm and sling  GM GM GM GM   Elbow AROM  30x 30x 30x 30x 30x 30      Pendulums   30x 30x 30x 30x 30 30xea 30x    Pball roll out          30x flx and scaption   Beach chair progression          3x10   Pball up wall             Standing shoulder flexion          20x   SL ER          20x   SL ABD          20x   Pulleys          10\"X10 "   Standing cane flexion          20x   Prone shoulder extension and abduction          20xea   Ther Activity                                       Gait Training                                       Modalities

## 2025-01-29 ENCOUNTER — OFFICE VISIT (OUTPATIENT)
Dept: PHYSICAL THERAPY | Facility: CLINIC | Age: 65
End: 2025-01-29
Payer: COMMERCIAL

## 2025-01-29 DIAGNOSIS — M25.512 ACUTE PAIN OF LEFT SHOULDER: Primary | ICD-10-CM

## 2025-01-29 DIAGNOSIS — Z98.890 S/P LEFT ROTATOR CUFF REPAIR: ICD-10-CM

## 2025-01-29 PROCEDURE — 97110 THERAPEUTIC EXERCISES: CPT | Performed by: PHYSICAL THERAPIST

## 2025-01-29 PROCEDURE — 97112 NEUROMUSCULAR REEDUCATION: CPT | Performed by: PHYSICAL THERAPIST

## 2025-01-29 NOTE — PROGRESS NOTES
"Daily Note     Today's date: 2025  Patient name: Edmond Ulrich  : 1960  MRN: [id number]  Referring provider: Darian Glover DO  Dx:   Encounter Diagnosis     ICD-10-CM    1. Acute pain of left shoulder  M25.512       2. S/P left rotator cuff repair  Z98.890                      Subjective: Patient continues to report that he continues to do well.  Feels like he is getting better each week      Objective: See treatment diary below      Assessment: Continues to progress well with AROM and PROM.  Minimal upper trap compensation present.  Continues to have decreased RTC endurance.  Progress as able.        Plan: Continue per plan of care.      Precautions: Follow protocol L R 2724    Access Code: [email or web address]  URL: [email or web address]  Date: 2025  Prepared by: Timur Hutton    POC expires Unit limit Auth Expiration date PT/OT/ST + Visit Limit?   25 6  52                           Visit/Unit Tracking  AUTH Status:  Date                Used 1               Remaining  51                    Manuals    Shoulder PROM FE today GM  GM GM GM GM GM  GM                                          Neuro Re-Ed             Scapular isometrics    20x 20x 20x 20x 20x 20x    Deltoid isometrics    Flx 3\" 15x 8x  5\"x15 5\" 15x 15x    LT ER          20x                                                       Ther Ex             Education HEP, sling, wound care, restrictions, expectations with therapy.   GM - discussed sling,cardiovascular exercise, use of stationary bike, falls   GM - use of arm and sling  GM GM GM GM   Elbow AROM  30x 30x 30x 30x 30x 30      Pendulums   30x 30x 30x 30x 30 30xea 30x    Pball roll out          30x flx and scaption   Beach chair progression          3x10   Pball up wall             Standing shoulder flexion          20x   SL ER          20x   SL ABD          20x   Pulleys          10\"X10   Standing cane " flexion          20x   Prone shoulder extension and abduction          20xea   Ther Activity                                       Gait Training                                       Modalities

## 2025-01-29 NOTE — PATIENT COMMUNICATION
Rec'd call from patient requesting to speak to Lillie. Staff currently unavailable.    Patient accepted 2/11/2025 @ 10:00 am phototherapy in [place].    Patient aware of office location.  Patient aware to arrive 15 minutes prior.    Patient did have some questions. I answered as best that I could but reassured him that the staff will explain everything to him when he is at the office.    Patient verbalized understanding.

## 2025-02-03 ENCOUNTER — OFFICE VISIT (OUTPATIENT)
Dept: PHYSICAL THERAPY | Facility: CLINIC | Age: 65
End: 2025-02-03
Payer: COMMERCIAL

## 2025-02-03 DIAGNOSIS — Z98.890 S/P LEFT ROTATOR CUFF REPAIR: ICD-10-CM

## 2025-02-03 DIAGNOSIS — M25.512 ACUTE PAIN OF LEFT SHOULDER: Primary | ICD-10-CM

## 2025-02-03 PROCEDURE — 97110 THERAPEUTIC EXERCISES: CPT | Performed by: PHYSICAL THERAPIST

## 2025-02-03 NOTE — PROGRESS NOTES
"Daily Note     Today's date: 2/3/2025  Patient name: Edmond Ulrich  : 1960  MRN: [id number]  Referring provider: Darian Glover DO  Dx:   Encounter Diagnosis     ICD-10-CM    1. Acute pain of left shoulder  M25.512       2. S/P left rotator cuff repair  Z98.890                      Subjective: Patient reports he is doing well, denies pain      Objective: See treatment diary below      Assessment: Very mild upper trap compensation present with overhead elevation.  Continues to demonstrate improvement with periscapular activation and motor control of the upper extremity.  Continue to progress as able.        Plan: Continue per plan of care.      Precautions: Follow protocol L R 2724    Access Code: [email or web address]  URL: [email or web address]  Date: 2025  Prepared by: Timur Hutton    POC expires Unit limit Auth Expiration date PT/OT/ST + Visit Limit?   25 6  52                           Visit/Unit Tracking  AUTH Status:  Date                Used 1               Remaining  51                    Manuals 2/3 12/2 12/4 12/11 12/12 12/16 12/18 12/26 12/30 1/29   Shoulder PROM  GM  GM GM GM GM GM  GM                                          Neuro Re-Ed             Scapular isometrics    20x 20x 20x 20x 20x 20x    Deltoid isometrics    Flx 3\" 15x 8x  5\"x15 5\" 15x 15x    LT ER 20x         20x   Scapular punches 20x                                                   Ther Ex             Education GM  GM - discussed sling,cardiovascular exercise, use of stationary bike, falls   GM - use of arm and sling  GM GM GM GM   Elbow AROM  30x 30x 30x 30x 30x 30      Pendulums   30x 30x 30x 30x 30 30xea 30x    Pball roll out          30x flx and scaption   Beach chair progression 3x10         3x10   Pball up wall             Standing shoulder flexion 30x         20x   SL ER 30x         20x   SL ABD 30x         20x   Pulleys 10\"20         10\"X10   Posterior capsule stretch 30\"x4          "   Standing cane flexion          20x   Prone shoulder extension and abduction 20xea         20xea   Ther Activity                                       Gait Training                                       Modalities

## 2025-02-05 ENCOUNTER — OFFICE VISIT (OUTPATIENT)
Dept: PHYSICAL THERAPY | Facility: CLINIC | Age: 65
End: 2025-02-05
Payer: COMMERCIAL

## 2025-02-05 DIAGNOSIS — M25.512 ACUTE PAIN OF LEFT SHOULDER: Primary | ICD-10-CM

## 2025-02-05 DIAGNOSIS — Z98.890 S/P LEFT ROTATOR CUFF REPAIR: ICD-10-CM

## 2025-02-05 PROCEDURE — 97110 THERAPEUTIC EXERCISES: CPT | Performed by: PHYSICAL THERAPIST

## 2025-02-05 NOTE — PROGRESS NOTES
"Daily Note     Today's date: 2025  Patient name: Edmond Ulrich  : 1960  MRN: [id number]  Referring provider: Darian Glover DO  Dx:   Encounter Diagnosis     ICD-10-CM    1. Acute pain of left shoulder  M25.512       2. S/P left rotator cuff repair  Z98.890                      Subjective: Patient continues to report that he is doing well, does feel like he has some weakness in the bicep region but otherwise feeling good.        Objective: See treatment diary below      Assessment: ROM and periscapular activation continues to improve for the patient.  Patient required verbal and tactile cueing throughout prescribed exercises for proper execution and dosage.  Progress as able.        Plan: Continue per plan of care.      Precautions: Follow protocol L RCR 2724    Access Code: [email or web address]  URL: [email or web address]  Date: 2025  Prepared by: Timur Hutton    POC expires Unit limit Auth Expiration date PT/OT/ST + Visit Limit?   25 52                           Visit/Unit Tracking  AUTH Status:  Date                Used 1               Remaining  51                    Manuals    Shoulder PROM  GM  GM GM GM GM GM  GM                                          Neuro Re-Ed             Scapular isometrics    20x 20x 20x 20x 20x 20x    Deltoid isometrics    Flx 3\" 15x 8x  5\"x15 5\" 15x 15x    LT ER 20x         20x   Scapular punches 20x                                                   Ther Ex             Education GM  GM - discussed sling,cardiovascular exercise, use of stationary bike, falls   GM - use of arm and sling  GM GM GM GM   Elbow AROM  30x 30x 30x 30x 30x 30      Pendulums   30x 30x 30x 30x 30 30xea 30x    Pball roll out          30x flx and scaption   Beach chair progression 3x10         3x10   Pball up wall             Standing shoulder flexion 30x         20x   SL ER 30x         20x   SL ABD 30x         " "20x   Pulleys 10\"20         10\"X10   Posterior capsule stretch 30\"x4            Standing cane flexion          20x   Prone shoulder extension and abduction 20xea         20xea   Ther Activity                                       Gait Training                                       Modalities                                                                            "

## 2025-02-10 ENCOUNTER — OFFICE VISIT (OUTPATIENT)
Dept: PHYSICAL THERAPY | Facility: CLINIC | Age: 65
End: 2025-02-10
Payer: COMMERCIAL

## 2025-02-10 DIAGNOSIS — Z98.890 S/P LEFT ROTATOR CUFF REPAIR: ICD-10-CM

## 2025-02-10 DIAGNOSIS — M25.512 ACUTE PAIN OF LEFT SHOULDER: Primary | ICD-10-CM

## 2025-02-10 PROCEDURE — 97112 NEUROMUSCULAR REEDUCATION: CPT | Performed by: PHYSICAL THERAPIST

## 2025-02-10 PROCEDURE — 97110 THERAPEUTIC EXERCISES: CPT | Performed by: PHYSICAL THERAPIST

## 2025-02-10 NOTE — PROGRESS NOTES
"Daily Note     Today's date: 2/10/2025  Patient name: Edmond Ulrich  : 1960  MRN: [id number]  Referring provider: Darian Glover DO  Dx:   Encounter Diagnosis     ICD-10-CM    1. Acute pain of left shoulder  M25.512       2. S/P left rotator cuff repair  Z98.890                      Subjective: Patient reports that he is doing well, continues to have no difficulties.        Objective: See treatment diary below      Assessment: Full pain free PROM in all planes today.  Did have some difficulties with scarecrow secondary to weakness with ER.  Continue to progress as able.        Plan: Continue per plan of care.      Precautions: Follow protocol L R 2724    Access Code: [email or web address]  URL: [email or web address]  Date: 2025  Prepared by: Timur Hutton    POC expires Unit limit Auth Expiration date PT/OT/ST + Visit Limit?   25 6  52                           Visit/Unit Tracking  AUTH Status:  Date                Used 1               Remaining  51                    Manuals 2/10 12/2 12/4 12/11 12/12 12/16 12/18 12/26 12/30 1/29   Shoulder PROM  GM  GM GM GM GM GM  GM                                          Neuro Re-Ed             Scapular isometrics    20x 20x 20x 20x 20x 20x    Deltoid isometrics    Flx 3\" 15x 8x  5\"x15 5\" 15x 15x    LT ER 20x         20x   Scapular punches 20x                                                   Ther Ex             Education GM  GM - discussed sling,cardiovascular exercise, use of stationary bike, falls   GM - use of arm and sling  GM GM GM GM   Elbow AROM  30x 30x 30x 30x 30x 30      Pendulums   30x 30x 30x 30x 30 30xea 30x    Pball roll out          30x flx and scaption   Beach chair progression 3x10         3x10   Pball up wall             Standing shoulder flexion 30x         20x   SL ER 30x         20x   SL ABD 30x         20x   Pulleys 10\"20         10\"X10   Posterior capsule stretch 30\"x4            Scarecrow 20x            Standing " cane flexion          20x   Prone shoulder extension and abduction 20xea         20xea   Ther Activity                                       Gait Training                                       Modalities

## 2025-02-11 ENCOUNTER — CLINICAL SUPPORT (OUTPATIENT)
Age: 65
End: 2025-02-11
Payer: COMMERCIAL

## 2025-02-11 VITALS
BODY MASS INDEX: 21.98 KG/M2 | SYSTOLIC BLOOD PRESSURE: 118 MMHG | TEMPERATURE: 97.8 F | WEIGHT: 145 LBS | HEIGHT: 68 IN | DIASTOLIC BLOOD PRESSURE: 78 MMHG | OXYGEN SATURATION: 99 % | HEART RATE: 80 BPM

## 2025-02-11 DIAGNOSIS — L20.9 ATOPIC DERMATITIS, UNSPECIFIED TYPE: Primary | ICD-10-CM

## 2025-02-11 PROCEDURE — 96910 PHOTCHMTX TAR&UVB/PTRLTM&UVB: CPT | Performed by: STUDENT IN AN ORGANIZED HEALTH CARE EDUCATION/TRAINING PROGRAM

## 2025-02-11 NOTE — PROGRESS NOTES
[facility] Dermatology Clinic Note     Patient Name: Edmond Ulrich  Encounter Date: 02/11/25     PHOTOTHERAPY    Treatment type: (P) Phototherapy  Visit number: (P) 1  Diagnosis: (P) Atopic Dermatitis  Anatomical location: (P) Trunk, Upper extremity, Lower extremity  Severity: (P) Moderate  BSA: (P) 10%  Treatment schedule: (P) 2x weekly  Reaction: (P) Other (1st treatment)  Increase %: (P) 10%  mJoules: (P) 303mJ  Total Dosed Cm2: (P) 0:44  Extra UVA: (P) No  Max dose: (P) TBD  Topical: (P) Mineral oil  Topical applied by: (P) Assistant  Special shield: (P) Goggles  Tech: (P) MAYA Moreira  Comments: (P) pt applies SPF to face and wears boxes during treatment    MAYA Lo

## 2025-02-12 ENCOUNTER — OFFICE VISIT (OUTPATIENT)
Dept: PHYSICAL THERAPY | Facility: CLINIC | Age: 65
End: 2025-02-12
Payer: COMMERCIAL

## 2025-02-12 DIAGNOSIS — M25.512 ACUTE PAIN OF LEFT SHOULDER: Primary | ICD-10-CM

## 2025-02-12 DIAGNOSIS — Z98.890 S/P LEFT ROTATOR CUFF REPAIR: ICD-10-CM

## 2025-02-12 PROCEDURE — 97112 NEUROMUSCULAR REEDUCATION: CPT | Performed by: PHYSICAL THERAPIST

## 2025-02-12 PROCEDURE — 97110 THERAPEUTIC EXERCISES: CPT | Performed by: PHYSICAL THERAPIST

## 2025-02-12 NOTE — PROGRESS NOTES
"Daily Note     Today's date: 2025  Patient name: Edmond Ulrich  : 1960  MRN: [id number]  Referring provider: Darian Glover DO  Dx:   Encounter Diagnosis     ICD-10-CM    1. Acute pain of left shoulder  M25.512       2. S/P left rotator cuff repair  Z98.890                      Subjective: Patient continues to report he is feeling well.        Objective: See treatment diary below      Assessment: 11 weeks post operative status.  Functional AROM and PROM in all planes.  Does continue to have some limitations with periscapular activation and external rotation endurance.  Minimal upper trap compensation present today.  Progress per protocol next week.        Plan: Continue per plan of care.      Precautions: Follow protocol L RCR 2724    Access Code: [email or web address]  URL: [email or web address]  Date: 2025  Prepared by: Timur Hutton    POC expires Unit limit Auth Expiration date PT/OT/ST + Visit Limit?   25 6  52                           Visit/Unit Tracking  AUTH Status:  Date                Used 1               Remaining  51                    Manuals    Shoulder PROM  GM  GM GM GM GM GM  GM                                          Neuro Re-Ed             Scapular isometrics    20x 20x 20x 20x 20x 20x    Deltoid isometrics    Flx 3\" 15x 8x  5\"x15 5\" 15x 15x    LT ER 20x         20x   Scapular punches 20x                                                   Ther Ex             Education GM  GM - discussed sling,cardiovascular exercise, use of stationary bike, falls   GM - use of arm and sling  GM GM GM GM   Elbow AROM  30x 30x 30x 30x 30x 30      Pendulums   30x 30x 30x 30x 30 30xea 30x    Pball roll out          30x flx and scaption   Beach chair progression 3x10         3x10   Pball up wall             Standing shoulder flexion 30x         20x   SL ER 30x         20x   SL ABD 30x         20x   Pulleys 10\"20         " "10\"X10   Posterior capsule stretch 30\"x4            Scarecrow 20x            Standing cane flexion          20x   Prone shoulder extension and abduction 20xea         20xea   Ther Activity                                       Gait Training                                       Modalities                                                                                "

## 2025-02-13 ENCOUNTER — CLINICAL SUPPORT (OUTPATIENT)
Age: 65
End: 2025-02-13
Payer: COMMERCIAL

## 2025-02-13 VITALS
SYSTOLIC BLOOD PRESSURE: 120 MMHG | TEMPERATURE: 98 F | OXYGEN SATURATION: 99 % | HEIGHT: 68 IN | DIASTOLIC BLOOD PRESSURE: 80 MMHG | BODY MASS INDEX: 21.98 KG/M2 | WEIGHT: 145 LBS | HEART RATE: 65 BPM

## 2025-02-13 DIAGNOSIS — L20.9 ATOPIC DERMATITIS, UNSPECIFIED TYPE: Primary | ICD-10-CM

## 2025-02-13 PROCEDURE — 96910 PHOTCHMTX TAR&UVB/PTRLTM&UVB: CPT | Performed by: STUDENT IN AN ORGANIZED HEALTH CARE EDUCATION/TRAINING PROGRAM

## 2025-02-13 RX ORDER — IBUPROFEN 600 MG/1
600 TABLET, FILM COATED ORAL EVERY 8 HOURS
COMMUNITY
Start: 2024-11-27

## 2025-02-13 RX ORDER — ROSUVASTATIN CALCIUM 5 MG/1
5 TABLET, COATED ORAL DAILY
COMMUNITY
Start: 2024-08-16 | End: 2025-08-16

## 2025-02-13 RX ORDER — ONDANSETRON 4 MG/1
TABLET, FILM COATED ORAL
COMMUNITY
Start: 2024-11-27

## 2025-02-13 RX ORDER — OXYCODONE AND ACETAMINOPHEN 5; 325 MG/1; MG/1
TABLET ORAL
COMMUNITY
Start: 2024-11-27

## 2025-02-13 NOTE — PROGRESS NOTES
[facility] Dermatology Clinic Note     Patient Name: Edmond Ulrich  Encounter Date: 02/13/2025     PHOTOTHERAPY    Treatment type: (P) Phototherapy  Visit number: (P) 2  Diagnosis: (P) Atopic Dermatitis  Anatomical location: (P) Trunk, Upper extremity, Lower extremity  Severity: (P) Moderate  BSA: (P) 10%  Treatment schedule: (P) 2x weekly  Reaction: (P) None  Increase %: (P) 10%  mJoules: (P) 330mJ  Total Dosed Cm2: (P) 0:45  Extra UVA: (P) No  Max dose: (P) TBD  Topical: (P) Mineral oil  Topical applied by: (P) Patient  Special shield: (P) Goggles  Tech: (P) MAYA Moreira  Comments: (P) pt applies spf to face at home and wears boxers during treatment    MAYA Lo

## 2025-02-17 ENCOUNTER — OFFICE VISIT (OUTPATIENT)
Dept: PHYSICAL THERAPY | Facility: CLINIC | Age: 65
End: 2025-02-17
Payer: COMMERCIAL

## 2025-02-17 DIAGNOSIS — M25.512 ACUTE PAIN OF LEFT SHOULDER: Primary | ICD-10-CM

## 2025-02-17 DIAGNOSIS — Z98.890 S/P LEFT ROTATOR CUFF REPAIR: ICD-10-CM

## 2025-02-17 PROCEDURE — 97112 NEUROMUSCULAR REEDUCATION: CPT | Performed by: PHYSICAL THERAPIST

## 2025-02-17 PROCEDURE — 97110 THERAPEUTIC EXERCISES: CPT | Performed by: PHYSICAL THERAPIST

## 2025-02-17 NOTE — PROGRESS NOTES
"Daily Note     Today's date: 2025  Patient name: Edmond Ulrich  : 1960  MRN: [id number]  Referring provider: Darian Glover DO  Dx:   Encounter Diagnosis     ICD-10-CM    1. Acute pain of left shoulder  M25.512       2. S/P left rotator cuff repair  Z98.890                      Subjective: Patient reports he had a good weekend, denies having any symptom exacerbation.      Objective: See treatment diary below      Assessment: Progressed patient per protocol with good response.  Denied having any soreness or pain during exercise.  As long as no soreness present, will updated HEP to reflect new exercises.        Plan: Continue per plan of care.      Precautions: Follow protocol L R 2724    Access Code: [email or web address]  URL: [email or web address]  Date: 2025  Prepared by: Timur Hutton    POC expires Unit limit Auth Expiration date PT/OT/ST + Visit Limit?   25 6  52                           Visit/Unit Tracking  AUTH Status:  Date                Used 1               Remaining  51                    Manuals    Shoulder PROM  GM  GM GM GM GM GM  GM                                          Neuro Re-Ed             Scapular isometrics    20x 20x 20x 20x 20x 20x    Deltoid isometrics    Flx 3\" 15x 8x  5\"x15 5\" 15x 15x    LT ER 20x Ytb 20x        20x   Scapular punches 20x 2# 20x                                                  Ther Ex             Education GM GM GM - discussed sling,cardiovascular exercise, use of stationary bike, falls   GM - use of arm and sling  GM GM GM GM   Elbow AROM  30x 30x 30x 30x 30x 30      Pendulums   30x 30x 30x 30x 30 30xea 30x    Pball roll out          30x flx and scaption   Beach chair progression 3x10         3x10   Pball up wall             Standing shoulder flexion 30x 2# 2x10        20x   SL ER 30x 2# 20x        20x   SL ABD 30x         20x   Pulleys 10\"20 10\"X20        10\"X10 " "  Posterior capsule stretch 30\"x4            Scarecrow 20x            TB IR  RTB 20x           TB ER  Ytb 20x           Standing cane flexion          20x   Prone shoulder extension and abduction 20xea         20xea   Ther Activity                                       Gait Training                                       Modalities                                                                                  "

## 2025-02-18 ENCOUNTER — CLINICAL SUPPORT (OUTPATIENT)
Age: 65
End: 2025-02-18
Payer: COMMERCIAL

## 2025-02-18 DIAGNOSIS — L20.9 ATOPIC DERMATITIS, UNSPECIFIED TYPE: Primary | ICD-10-CM

## 2025-02-18 PROCEDURE — 96910 PHOTCHMTX TAR&UVB/PTRLTM&UVB: CPT | Performed by: STUDENT IN AN ORGANIZED HEALTH CARE EDUCATION/TRAINING PROGRAM

## 2025-02-18 NOTE — PROGRESS NOTES
PHOTOTHERAPY    Treatment type: Phototherapy  Visit number: 3  Diagnosis: Atopic Dermatitis  Anatomical location: Trunk, Upper extremity, Lower extremity  Severity: Moderate  BSA: 10%  Treatment schedule: 2x weekly  Reaction: None  Increase %: 10%  mJoules: 367mJ  Total Dosed Cm2: 0:44 sec  Extra UVA: No  Max dose: TBD  Topical: Mineral oil  Topical applied by: Patient  Special shield: Goggles  Tech: Poppy TREJO  Comments: pt applies SPF at home and wears his boxers during treamtent

## 2025-02-19 ENCOUNTER — OFFICE VISIT (OUTPATIENT)
Dept: PHYSICAL THERAPY | Facility: CLINIC | Age: 65
End: 2025-02-19
Payer: COMMERCIAL

## 2025-02-19 DIAGNOSIS — Z98.890 S/P LEFT ROTATOR CUFF REPAIR: ICD-10-CM

## 2025-02-19 DIAGNOSIS — M25.512 ACUTE PAIN OF LEFT SHOULDER: Primary | ICD-10-CM

## 2025-02-19 PROCEDURE — 97112 NEUROMUSCULAR REEDUCATION: CPT | Performed by: PHYSICAL THERAPIST

## 2025-02-19 PROCEDURE — 97110 THERAPEUTIC EXERCISES: CPT | Performed by: PHYSICAL THERAPIST

## 2025-02-19 NOTE — PROGRESS NOTES
"Daily Note     Today's date: 2025  Patient name: Edomnd Ulrich  : 1960  MRN: [id number]  Referring provider: Darian Glover DO  Dx:   Encounter Diagnosis     ICD-10-CM    1. Acute pain of left shoulder  M25.512       2. S/P left rotator cuff repair  Z98.890                      Subjective: Reports that he was a little sore after last visit      Objective: See treatment diary below      Assessment: Gradual addition of resistance today continues.  Weakness with external rotation and with forward elevation present.  Continue to progress as able.        Plan: Continue per plan of care.      Precautions: Follow protocol L R 2724    Access Code: [email or web address]  URL: [email or web address]  Date: 2025  Prepared by: Timur Hutton    POC expires Unit limit Auth Expiration date PT/OT/ST + Visit Limit?   25 6  52                           Visit/Unit Tracking  AUTH Status:  Date                Used 1               Remaining  51                    Manuals    Shoulder PROM  GM GM GM GM GM GM GM  GM                                          Neuro Re-Ed             Scapular isometrics    20x 20x 20x 20x 20x 20x    Deltoid isometrics    Flx 3\" 15x 8x  5\"x15 5\" 15x 15x    LT ER 20x Ytb 20x        20x   Scapular punches 20x 2# 20x 2# 20x          RTC isometrics   5\" 10xea                                    Ther Ex             Education GM GM    GM - use of arm and sling  GM GM GM GM   Elbow AROM  30x  30x 30x 30x 30      Pendulums    30x 30x 30x 30 30xea 30x    Pball roll out          30x flx and scaption   Beach chair progression 3x10  2# 20x       3x10   Pball up wall             Standing shoulder flexion 30x 2# 2x10        20x   SL ER 30x 2# 20x MRE 2x10       20x   SL ABD 30x         20x   Pulleys 10\"20 10\"X20 10\"X20       10\"X10   Posterior capsule stretch 30\"x4            Scarecrow 20x            TB IR  RTB 20x 20x rtb       "    TB ER  Ytb 20x 20x rtb          Standing cane flexion          20x   Prone shoulder extension and abduction 20xea         20xea   Ther Activity                                       Gait Training                                       Modalities

## 2025-02-20 ENCOUNTER — CLINICAL SUPPORT (OUTPATIENT)
Age: 65
End: 2025-02-20
Payer: COMMERCIAL

## 2025-02-20 VITALS
WEIGHT: 145 LBS | HEART RATE: 66 BPM | OXYGEN SATURATION: 97 % | HEIGHT: 68 IN | TEMPERATURE: 97.8 F | SYSTOLIC BLOOD PRESSURE: 121 MMHG | DIASTOLIC BLOOD PRESSURE: 80 MMHG | BODY MASS INDEX: 21.98 KG/M2

## 2025-02-20 DIAGNOSIS — L20.9 ATOPIC DERMATITIS, UNSPECIFIED TYPE: Primary | ICD-10-CM

## 2025-02-20 PROCEDURE — 96910 PHOTCHMTX TAR&UVB/PTRLTM&UVB: CPT | Performed by: STUDENT IN AN ORGANIZED HEALTH CARE EDUCATION/TRAINING PROGRAM

## 2025-02-20 RX ORDER — DUPILUMAB 300 MG/2ML
INJECTION, SOLUTION SUBCUTANEOUS
COMMUNITY
Start: 2025-02-13

## 2025-02-20 NOTE — PROGRESS NOTES
[facility] Dermatology Clinic Note     Patient Name: Edmond Ulrich  Encounter Date: 02/20/25     PHOTOTHERAPY    Treatment type: Phototherapy  Visit number: 4  Diagnosis: Atopic Dermatitis  Anatomical location: (P) Trunk, Upper extremity, Lower extremity  Severity: (P) Moderate  BSA: (P) 10%  Treatment schedule: (P) 2x weekly  Reaction: (P) None  Increase %: (P) 10%  mJoules: (P) 405mJ  Total Dosed Cm2: (P) 0:54  Extra UVA: (P) No  Max dose: (P) TBD  Topical: (P) Mineral oil  Topical applied by: (P) Patient  Special shield: (P) Goggles  Tech: (P) MAYA Moreira  Comments: (P) pt applies SPF on face    MAYA Lo

## 2025-02-24 ENCOUNTER — OFFICE VISIT (OUTPATIENT)
Dept: PHYSICAL THERAPY | Facility: CLINIC | Age: 65
End: 2025-02-24
Payer: COMMERCIAL

## 2025-02-24 DIAGNOSIS — Z98.890 S/P LEFT ROTATOR CUFF REPAIR: ICD-10-CM

## 2025-02-24 DIAGNOSIS — M25.512 ACUTE PAIN OF LEFT SHOULDER: Primary | ICD-10-CM

## 2025-02-24 PROCEDURE — 97112 NEUROMUSCULAR REEDUCATION: CPT | Performed by: PHYSICAL THERAPIST

## 2025-02-24 PROCEDURE — 97110 THERAPEUTIC EXERCISES: CPT | Performed by: PHYSICAL THERAPIST

## 2025-02-24 NOTE — PROGRESS NOTES
"Daily Note     Today's date: 2025  Patient name: Edmond Ulrich  : 1960  MRN: [id number]  Referring provider: Darian Glover DO  Dx:   Encounter Diagnosis     ICD-10-CM    1. Acute pain of left shoulder  M25.512       2. S/P left rotator cuff repair  Z98.890                      Subjective: Patient continues to report that he is feeling well.        Objective: See treatment diary below      Assessment: Addition of neuromuscular control today with scapular alphabet.  Continues to demonstrate improvement with strength in the upper extremity.  Progress able.  Full, pain free PROM exists.        Plan: Continue per plan of care.      Precautions: Follow protocol L R 2724    Access Code: [email or web address]  URL: [email or web address]  Date: 2025  Prepared by: Timur Hutton    POC expires Unit limit Auth Expiration date PT/OT/ST + Visit Limit?   25 6  52                           Visit/Unit Tracking  AUTH Status:  Date                Used 1               Remaining  51                    Manuals    Shoulder PROM  GM GM GM GM GM GM GM  GM                                          Neuro Re-Ed             Scapular isometrics   ABC 2# 1x 20x 20x 20x 20x 20x 20x    Deltoid isometrics    Flx 3\" 15x 8x  5\"x15 5\" 15x 15x    LT ER 20x Ytb 20x        20x   Scapular punches 20x 2# 20x 2# 20x          RTC isometrics   5\" 10xea                                    Ther Ex             Education GM GM    GM - use of arm and sling  GM GM GM GM   Elbow AROM  30x  30x 30x 30x 30      Pendulums    30x 30x 30x 30 30xea 30x    Pball roll out          30x flx and scaption   Beach chair progression 3x10  2# 20x       3x10   Pball up wall             Standing shoulder flexion 30x 2# 2x10        20x   SL ER 30x 2# 20x MRE 2x10       20x   SL ABD 30x         20x   Pulleys 10\"20 10\"X20 10\"X20       10\"X10   Posterior capsule stretch 30\"x4          "   Scarecrow 20x            TB IR  RTB 20x 20x rtb          TB ER  Ytb 20x 20x rtb          Standing cane flexion          20x   Prone shoulder extension and abduction 20xea         20xea   Ther Activity                                       Gait Training                                       Modalities

## 2025-02-25 ENCOUNTER — CLINICAL SUPPORT (OUTPATIENT)
Age: 65
End: 2025-02-25
Payer: COMMERCIAL

## 2025-02-25 ENCOUNTER — TELEPHONE (OUTPATIENT)
Age: 65
End: 2025-02-25

## 2025-02-25 VITALS
WEIGHT: 151.62 LBS | TEMPERATURE: 97.8 F | SYSTOLIC BLOOD PRESSURE: 122 MMHG | OXYGEN SATURATION: 98 % | DIASTOLIC BLOOD PRESSURE: 82 MMHG | BODY MASS INDEX: 22.98 KG/M2 | HEART RATE: 66 BPM | HEIGHT: 68 IN

## 2025-02-25 DIAGNOSIS — L20.9 ATOPIC DERMATITIS, UNSPECIFIED TYPE: Primary | ICD-10-CM

## 2025-02-25 PROCEDURE — 96910 PHOTCHMTX TAR&UVB/PTRLTM&UVB: CPT | Performed by: STUDENT IN AN ORGANIZED HEALTH CARE EDUCATION/TRAINING PROGRAM

## 2025-02-25 NOTE — PROGRESS NOTES
[facility] Dermatology Clinic Note     Patient Name: Edmond Ulrich  Encounter Date: 2/25/25    PHOTOTHERAPY    Treatment type: (P) Phototherapy  Visit number: (P) 5  Diagnosis: (P) Atopic Dermatitis  Anatomical location: (P) Trunk, Upper extremity, Lower extremity  Severity: (P) Moderate  BSA: (P) 10%  Treatment schedule: (P) 2x weekly  Reaction: (P) None  Increase %: (P) 10%  mJoules: (P) 439mJ  Total Dosed Cm2: (P) 56  Extra UVA: (P) No  Max dose: (P) TBD  Topical: (P) Mineral oil  Topical applied by: (P) Patient  Special shield: (P) Goggles  Tech: (P) MAYA Escobar  Comments: (P) patient applies SPF on face     Alma Cotto

## 2025-02-25 NOTE — TELEPHONE ENCOUNTER
LVM informing pt if he would like to see Dr. Mansfield this Thusday at [facility] and also if he would like to switch his photo treatments to [place] since we recently started or if would liketo continue in [place] and we cancel the visits when there is no provider here. I advised pt to call back tomorrow morning or send me a [employer] message.

## 2025-02-26 ENCOUNTER — OFFICE VISIT (OUTPATIENT)
Dept: PHYSICAL THERAPY | Facility: CLINIC | Age: 65
End: 2025-02-26
Payer: COMMERCIAL

## 2025-02-26 DIAGNOSIS — Z98.890 S/P LEFT ROTATOR CUFF REPAIR: ICD-10-CM

## 2025-02-26 DIAGNOSIS — M25.512 ACUTE PAIN OF LEFT SHOULDER: Primary | ICD-10-CM

## 2025-02-26 PROCEDURE — 97110 THERAPEUTIC EXERCISES: CPT | Performed by: PHYSICAL THERAPIST

## 2025-02-26 NOTE — TELEPHONE ENCOUNTER
Patient called stating he was told by Lillie to ask for Joycelyn, I reached out to Joycelyn and she stated he needed to speak to Lillie.    Lillie, please give patient a call when you are available. Thank you!

## 2025-02-26 NOTE — PROGRESS NOTES
"Daily Note     Today's date: 2025  Patient name: Edmond Ulrich  : 1960  MRN: [id number]  Referring provider: Darian Glover DO  Dx:   Encounter Diagnosis     ICD-10-CM    1. Acute pain of left shoulder  M25.512       2. S/P left rotator cuff repair  Z98.890                      Subjective: Patient reports that he is doing well, feels better each week.        Objective: See treatment diary below      Assessment: Full PROM remains, does have some difficulties with completing lower trap activation.  Continue to progress with motor control and strengthening per protocol.        Plan: Continue per plan of care.      Precautions: Follow protocol L R 2724    Access Code: [email or web address]  URL: [email or web address]  Date: 2025  Prepared by: Timur Hutton    POC expires Unit limit Auth Expiration date PT/OT/ST + Visit Limit?   25 6  52                           Visit/Unit Tracking  AUTH Status:  Date                Used 1               Remaining  51                    Manuals    Shoulder PROM  GM GM GM GM GM GM GM  GM                                          Neuro Re-Ed             Scapular isometrics   ABC 2# 1x 20x 20x 20x 20x 20x 20x    Deltoid isometrics    Flx 3\" 15x 8x  5\"x15 5\" 15x 15x    LT ER 20x Ytb 20x Ytb 20x       20x   Scapular punches 20x 2# 20x 2# 20x          RTC isometrics   5\" 10xea                                    Ther Ex             Education GM GM    GM - use of arm and sling  GM GM GM GM   Elbow AROM  30x  30x 30x 30x 30      Pendulums    30x 30x 30x 30 30xea 30x    Pball roll out          30x flx and scaption   Beach chair progression 3x10  2# 20x       3x10   Pball up wall             Standing shoulder flexion 30x 2# 2x10        20x   SL ER 30x 2# 20x MRE 2x10       20x   SL ABD 30x         20x   Pulleys 10\"20 10\"X20 10\"X20       10\"X10   Posterior capsule stretch 30\"x4            Scarecrow " 20x            TB IR  RTB 20x 20x rtb          TB ER  Ytb 20x 20x rtb          BOR   5# 3x10          Standing cane flexion          20x   Prone shoulder extension and abduction 20xea         20xea   Ther Activity                                       Gait Training                                       Modalities

## 2025-02-26 NOTE — TELEPHONE ENCOUNTER
Received call from patient stating he has been plaing phone tag with Lillie.    Patient asked if I can get him connected to Lillie.    I messaged Lillie via teams to ask her if she is available?    Lillie did not answer, I assumed she was busy.    I told patient she is not available.    Patient said he will speak to Lillie at his appt on 02/27/2025

## 2025-02-27 ENCOUNTER — CLINICAL SUPPORT (OUTPATIENT)
Age: 65
End: 2025-02-27

## 2025-02-27 VITALS
WEIGHT: 151 LBS | OXYGEN SATURATION: 97 % | SYSTOLIC BLOOD PRESSURE: 120 MMHG | DIASTOLIC BLOOD PRESSURE: 78 MMHG | BODY MASS INDEX: 22.88 KG/M2 | HEIGHT: 68 IN | HEART RATE: 69 BPM | TEMPERATURE: 98 F

## 2025-02-27 DIAGNOSIS — L20.9 ATOPIC DERMATITIS, UNSPECIFIED TYPE: Primary | ICD-10-CM

## 2025-02-27 NOTE — PROGRESS NOTES
[facility] Dermatology Clinic Note     Patient Name: Edmond Ulrich  Encounter Date: 02/27/2025     PHOTOTHERAPY    Treatment type: (P) Phototherapy  Visit number: (P) 6  Diagnosis: (P) Atopic Dermatitis  Anatomical location: (P) Trunk, Upper extremity, Lower extremity  Severity: (P) Moderate  BSA: (P) 10%  Treatment schedule: (P) 2x weekly  Reaction: (P) None  Increase %: (P) 10%  mJoules: (P) 484mJ  Total Dosed Cm2: (P) 1:03  Extra UVA: (P) No  Max dose: (P) TBD  Topical: (P) Mineral oil  Topical applied by: (P) Patient  Special shield: (P) Goggles  Tech: (P) Lillie  Comments: (P) pt applies SPF to face

## 2025-03-03 ENCOUNTER — OFFICE VISIT (OUTPATIENT)
Dept: PHYSICAL THERAPY | Facility: CLINIC | Age: 65
End: 2025-03-03
Payer: COMMERCIAL

## 2025-03-03 DIAGNOSIS — Z98.890 S/P LEFT ROTATOR CUFF REPAIR: ICD-10-CM

## 2025-03-03 DIAGNOSIS — M25.512 ACUTE PAIN OF LEFT SHOULDER: Primary | ICD-10-CM

## 2025-03-03 PROCEDURE — 97112 NEUROMUSCULAR REEDUCATION: CPT | Performed by: PHYSICAL THERAPIST

## 2025-03-03 PROCEDURE — 97110 THERAPEUTIC EXERCISES: CPT | Performed by: PHYSICAL THERAPIST

## 2025-03-03 NOTE — PROGRESS NOTES
"Daily Note     Today's date: 3/3/2025  Patient name: Edmond Ulrich  : 1960  MRN: [id number]  Referring provider: Darian Glover DO  Dx:   Encounter Diagnosis     ICD-10-CM    1. Acute pain of left shoulder  M25.512       2. S/P left rotator cuff repair  Z98.890                      Subjective: Patient reports that he is doing well, notes having a little bit of discomfort while performing flexion based movements.  Otherwise doing well.        Objective: See treatment diary below      Assessment: Full PROM present.  Does continue to have some weakness with ER.  No upper trap compensation present today.        Plan: Continue per plan of care.      Precautions: Follow protocol L R 2724    Access Code: [email or web address]  URL: [email or web address]  Date: 2025  Prepared by: Timur Hutton    POC expires Unit limit Auth Expiration date PT/OT/ST + Visit Limit?   25 6  52                           Visit/Unit Tracking  AUTH Status:  Date                Used 1               Remaining  51                    Manuals 2/12 2/17 2/26 3/3 12/12 12/16 12/18 12/26 12/30 1/29   Shoulder PROM  GM GM GM GM GM GM GM  GM                                          Neuro Re-Ed             Scapular isometrics   ABC 2# 1x  20x 20x 20x 20x 20x    Deltoid isometrics     8x  5\"x15 5\" 15x 15x    LT ER 20x Ytb 20x Ytb 20x YTB 20x       20x   Scapular punches 20x 2# 20x 2# 20x 3# 20x         RTC isometrics   5\" 10xea RTC 10x                                   Ther Ex             Education GM GM    GM - use of arm and sling  GM GM GM GM   Elbow AROM  30x   30x 30x 30      Pendulums     30x 30x 30 30xea 30x    Pball roll out          30x flx and scaption   Beach chair progression 3x10  2# 20x 2# 20x      3x10   Pball up wall             Standing shoulder flexion 30x 2# 2x10        20x   SL ER 30x 2# 20x MRE 2x10 np      20x   SL ABD 30x         20x   Pulleys 10\"20 10\"X20 10\"X20 10\"X20      10\"X10   Posterior " "capsule stretch 30\"x4            Scarecrow 20x            TB IR  RTB 20x 20x rtb Rtb 20x         TB ER  Ytb 20x 20x rtb Rtb 20x         BOR   5# 3x10 5# 3x10         Standing cane flexion          20x   Prone shoulder extension and abduction 20xea         20xea   Ther Activity                                       Gait Training                                       Modalities                                                                                          "

## 2025-03-04 ENCOUNTER — CLINICAL SUPPORT (OUTPATIENT)
Age: 65
End: 2025-03-04

## 2025-03-04 VITALS
WEIGHT: 150 LBS | TEMPERATURE: 97.6 F | DIASTOLIC BLOOD PRESSURE: 80 MMHG | OXYGEN SATURATION: 99 % | SYSTOLIC BLOOD PRESSURE: 119 MMHG | BODY MASS INDEX: 22.73 KG/M2 | HEIGHT: 68 IN | HEART RATE: 80 BPM

## 2025-03-04 DIAGNOSIS — L20.9 ATOPIC DERMATITIS, UNSPECIFIED TYPE: Primary | ICD-10-CM

## 2025-03-05 ENCOUNTER — OFFICE VISIT (OUTPATIENT)
Dept: PHYSICAL THERAPY | Facility: CLINIC | Age: 65
End: 2025-03-05
Payer: COMMERCIAL

## 2025-03-05 DIAGNOSIS — M25.512 ACUTE PAIN OF LEFT SHOULDER: Primary | ICD-10-CM

## 2025-03-05 DIAGNOSIS — Z98.890 S/P LEFT ROTATOR CUFF REPAIR: ICD-10-CM

## 2025-03-05 PROCEDURE — 97110 THERAPEUTIC EXERCISES: CPT | Performed by: PHYSICAL THERAPIST

## 2025-03-05 PROCEDURE — 97112 NEUROMUSCULAR REEDUCATION: CPT | Performed by: PHYSICAL THERAPIST

## 2025-03-05 NOTE — PROGRESS NOTES
"Daily Note     Today's date: 3/5/2025  Patient name: Edmond Ulrich  : 1960  MRN: [id number]  Referring provider: Darian Glover DO  Dx:   Encounter Diagnosis     ICD-10-CM    1. Acute pain of left shoulder  M25.512       2. S/P left rotator cuff repair  Z98.890                      Subjective: Patient reports that he saw his surgeon's office, notes the appointment went well.        Objective: See treatment diary below      Assessment: Continues to have full PROM in all planes, active range of motion is limited with ER.  Does continue to demonstrate improvements with strength during forward elevation.  He does require cueing to maintain a steady pace vs allowing momentum to complete movements for him.  Progress as able.        Plan: Continue per plan of care.      Precautions: Follow protocol L RCR 2724    Access Code: [email or web address]  URL: [email or web address]  Date: 2025  Prepared by: Timur Hutton    POC expires Unit limit Auth Expiration date PT/OT/ST + Visit Limit?   25 6  52                           Visit/Unit Tracking  AUTH Status:  Date                Used 1               Remaining  51                    Manuals 2/12 2/17 2/26 3/3 3/5 12/16 12/18 12/26 12/30 1/29   Shoulder PROM  GM GM GM GM GM GM GM  GM                                          Neuro Re-Ed             Scapular isometrics   ABC 2# 1x   20x 20x 20x 20x    Deltoid isometrics       5\"x15 5\" 15x 15x    LT ER 20x Ytb 20x Ytb 20x YTB 20x  YTB 20x     20x   Scapular punches 20x 2# 20x 2# 20x 3# 20x 3# 20x        RTC isometrics   5\" 10xea RTC 10x                                   Ther Ex             Education GM GM   GM GM - use of arm and sling  GM GM GM GM   Elbow AROM  30x   30x 30x 30      Pendulums     30x 30x 30 30xea 30x    Pball roll out          30x flx and scaption   Beach chair progression 3x10  2# 20x 2# 20x 3# 20x     3x10   Pball up wall             Standing shoulder flexion 30x 2# 2x10      " "  20x   SL ER 30x 2# 20x MRE 2x10 np      20x   SL ABD 30x         20x   Pulleys 10\"20 10\"X20 10\"X20 10\"X20 10\"X20     10\"X10   Posterior capsule stretch 30\"x4            Scarecrow 20x            TB IR  RTB 20x 20x rtb Rtb 20x BTB 20x        TB ER  Ytb 20x 20x rtb Rtb 20x Rtb 20x        TB Row     Blck 20x        BOR   5# 3x10 5# 3x10 np        Standing cane flexion          20x   Prone shoulder extension and abduction 20xea         20xea   Ther Activity                                       Gait Training                                       Modalities                                                                                            "

## 2025-03-10 ENCOUNTER — OFFICE VISIT (OUTPATIENT)
Dept: PHYSICAL THERAPY | Facility: CLINIC | Age: 65
End: 2025-03-10
Payer: COMMERCIAL

## 2025-03-10 DIAGNOSIS — M25.512 ACUTE PAIN OF LEFT SHOULDER: Primary | ICD-10-CM

## 2025-03-10 DIAGNOSIS — Z98.890 S/P LEFT ROTATOR CUFF REPAIR: ICD-10-CM

## 2025-03-10 PROCEDURE — 97112 NEUROMUSCULAR REEDUCATION: CPT | Performed by: PHYSICAL THERAPIST

## 2025-03-10 PROCEDURE — 97110 THERAPEUTIC EXERCISES: CPT | Performed by: PHYSICAL THERAPIST

## 2025-03-10 NOTE — PROGRESS NOTES
"Daily Note     Today's date: 3/10/2025  Patient name: Edmond Ulrich  : 1960  MRN: [id number]  Referring provider: Darian Glover DO  Dx:   Encounter Diagnosis     ICD-10-CM    1. Acute pain of left shoulder  M25.512       2. S/P left rotator cuff repair  Z98.890                      Subjective: Patient continues to report he's doing well.      Objective: See treatment diary below      Assessment: Progressed patient today to include TB front raise and medball iso squeeze lift.  Was fatigue with both of these exercises.  Continues to have fatigue during exercise but no pain.  Continue to progress as able.        Plan: Continue per plan of care.      Precautions: Follow protocol L RCR 2724    Access Code: [email or web address]  URL: [email or web address]  Date: 2025  Prepared by: Timur Hutton    POC expires Unit limit Auth Expiration date PT/OT/ST + Visit Limit?   25 6  52                           Visit/Unit Tracking  AUTH Status:  Date                Used 1               Remaining  51                    Manuals 2/12 2/17 2/26 3/3 3/10 12/16 12/18 12/26 12/30 1/29   Shoulder PROM  GM GM GM GM GM GM GM  GM                                          Neuro Re-Ed             Scapular isometrics   ABC 2# 1x   20x 20x 20x 20x    Deltoid isometrics       5\"x15 5\" 15x 15x    LT ER 20x Ytb 20x Ytb 20x YTB 20x  YTB 20x     20x   Scapular punches 20x 2# 20x 2# 20x 3# 20x 3# 20x        RTC isometrics   5\" 10xea RTC 10x         Subscap medball iso lift     Rmb 2x10                     Ther Ex             Education GM GM   GM GM - use of arm and sling  GM GM GM GM   Elbow AROM  30x   30x 30x 30      Pendulums     30x 30x 30 30xea 30x    Pball roll out          30x flx and scaption   Beach chair progression 3x10  2# 20x 2# 20x 3# 20x     3x10   Pball up wall             Standing shoulder flexion 30x 2# 2x10        20x   SL ER 30x 2# 20x MRE 2x10 np      20x   SL ABD 30x         20x   Pulleys " "10\"20 10\"X20 10\"X20 10\"X20 10\"X20     10\"X10   Posterior capsule stretch 30\"x4            Scarecrow 20x            TB IR  RTB 20x 20x rtb Rtb 20x BTB 20x        TB ER  Ytb 20x 20x rtb Rtb 20x Rtb 20x        TB Row     Blck 20x        BOR   5# 3x10 5# 3x10 np        Standing cane flexion          20x   TB front delt raise     Rtb 3x10        Prone shoulder extension and abduction 20xea         20xea   Ther Activity                                       Gait Training                                       Modalities                                                                                              "

## 2025-03-12 ENCOUNTER — OFFICE VISIT (OUTPATIENT)
Dept: PHYSICAL THERAPY | Facility: CLINIC | Age: 65
End: 2025-03-12
Payer: COMMERCIAL

## 2025-03-12 DIAGNOSIS — M25.512 ACUTE PAIN OF LEFT SHOULDER: Primary | ICD-10-CM

## 2025-03-12 DIAGNOSIS — Z98.890 S/P LEFT ROTATOR CUFF REPAIR: ICD-10-CM

## 2025-03-12 PROCEDURE — 97110 THERAPEUTIC EXERCISES: CPT | Performed by: PHYSICAL THERAPIST

## 2025-03-12 PROCEDURE — 97112 NEUROMUSCULAR REEDUCATION: CPT | Performed by: PHYSICAL THERAPIST

## 2025-03-12 NOTE — PROGRESS NOTES
"Daily Note     Today's date: 3/12/2025  Patient name: Edmond Ulrich  : 1960  MRN: [id number]  Referring provider: Darian Glover DO  Dx:   Encounter Diagnosis     ICD-10-CM    1. Acute pain of left shoulder  M25.512       2. S/P left rotator cuff repair  Z98.890                      Subjective: Continues to report hat he is feeling well      Objective: See treatment diary below      Assessment: Continues to progress well with motor control and periscapular strength.  Does continue to have limitations with ER strength and power.  Better motor control with subscap isometric forward raise.  Patient required verbal and tactile cueing throughout prescribed exercises for proper execution and dosage.        Plan: Continue per plan of care.      Precautions: Follow protocol L R 2724    Access Code: [email or web address]  URL: [email or web address]  Date: 2025  Prepared by: Timur Hutton    POC expires Unit limit Auth Expiration date PT/OT/ST + Visit Limit?   25 6  52                           Visit/Unit Tracking  AUTH Status:  Date                Used 1               Remaining  51                    Manuals  3/3 3/12 12/16 12/18 12/26 12/30 1/29   Shoulder PROM  GM GM GM GM GM GM GM  GM                                          Neuro Re-Ed             Scapular isometrics   ABC 2# 1x   20x 20x 20x 20x    Deltoid isometrics       5\"x15 5\" 15x 15x    LT ER 20x Ytb 20x Ytb 20x YTB 20x  YTB 20x     20x   Scapular punches 20x 2# 20x 2# 20x 3# 20x 3# 20x        RTC isometrics   5\" 10xea RTC 10x         Subscap medball iso lift     Rmb 2x10                     Ther Ex             Education GM GM   GM GM - use of arm and sling  GM GM GM GM   Elbow AROM  30x   30x 30x 30      Pendulums     30x 30x 30 30xea 30x    Pball roll out          30x flx and scaption   Beach chair progression 3x10  2# 20x 2# 20x 3# 20x     3x10   Pball up wall             Standing shoulder flexion 30x 2# " "2x10        20x   SL ER 30x 2# 20x MRE 2x10 np      20x   SL ABD 30x         20x   Pulleys 10\"20 10\"X20 10\"X20 10\"X20 10\"X20     10\"X10   Posterior capsule stretch 30\"x4            Scarecrow 20x            TB IR  RTB 20x 20x rtb Rtb 20x BTB 20x        TB ER  Ytb 20x 20x rtb Rtb 20x Rtb 20x        TB Row     [place] 15# 30x        BOR   5# 3x10 5# 3x10 np        Standing cane flexion          20x   TB front delt raise     Rtb 3x10        Prone shoulder extension and abduction 20xea         20xea   Ther Activity                                       Gait Training                                       Modalities                                                                                                "

## 2025-03-17 ENCOUNTER — OFFICE VISIT (OUTPATIENT)
Dept: PHYSICAL THERAPY | Facility: CLINIC | Age: 65
End: 2025-03-17
Payer: COMMERCIAL

## 2025-03-17 DIAGNOSIS — M25.512 ACUTE PAIN OF LEFT SHOULDER: Primary | ICD-10-CM

## 2025-03-17 DIAGNOSIS — Z98.890 S/P LEFT ROTATOR CUFF REPAIR: ICD-10-CM

## 2025-03-17 PROCEDURE — 97110 THERAPEUTIC EXERCISES: CPT | Performed by: PHYSICAL THERAPIST

## 2025-03-17 PROCEDURE — 97112 NEUROMUSCULAR REEDUCATION: CPT | Performed by: PHYSICAL THERAPIST

## 2025-03-17 NOTE — PROGRESS NOTES
"Daily Note     Today's date: 3/17/2025  Patient name: Edmond Ulrich  : 1960  MRN: [id number]  Referring provider: Darian Glover DO  Dx:   Encounter Diagnosis     ICD-10-CM    1. Acute pain of left shoulder  M25.512       2. S/P left rotator cuff repair  Z98.890                      Subjective: Continues to do well in therapy, denies having any exacerbations.        Objective: See treatment diary below      Assessment: No upper trap compensations today, does have some elbow flexion present during shoulder flexion.  Patient required verbal and tactile cueing throughout prescribed exercises for proper execution and dosage.        Plan: Continue per plan of care.      Precautions: Follow protocol L R 2724    Access Code: [email or web address]  URL: [email or web address]  Date: 2025  Prepared by: Timur Hutton    POC expires Unit limit Auth Expiration date PT/OT/ST + Visit Limit?   25 6  52                           Visit/Unit Tracking  AUTH Status:  Date                Used 1               Remaining  51                    Manuals 2/12 2/17 2/26 3/3 3/17 12/16 12/18 12/26 12/30 1/29   Shoulder PROM  GM GM GM GM GM GM GM  GM                                          Neuro Re-Ed             Scapular isometrics   ABC 2# 1x   20x 20x 20x 20x    Deltoid isometrics       5\"x15 5\" 15x 15x    LT ER 20x Ytb 20x Ytb 20x YTB 20x  YTB 20x     20x   Scapular punches 20x 2# 20x 2# 20x 3# 20x 3# 20x        RTC isometrics   5\" 10xea RTC 10x         Subscap medball iso lift     Rmb 2x10                     Ther Ex             Education GM GM   GM GM - use of arm and sling  GM GM GM GM   Elbow AROM  30x   30x 30x 30      Pendulums     30x 30x 30 30xea 30x    Pball roll out          30x flx and scaption   Beach chair progression 3x10  2# 20x 2# 20x 3# 20x     3x10   Pball up wall             Standing shoulder flexion 30x 2# 2x10        20x   SL ER 30x 2# 20x MRE 2x10 np      20x   SL ABD 30x         " "20x   Pulleys 10\"20 10\"X20 10\"X20 10\"X20 10\"X20     10\"X10   Posterior capsule stretch 30\"x4            Scarecrow 20x            TB IR  RTB 20x 20x rtb Rtb 20x BTB 20x        TB ER  Ytb 20x 20x rtb Rtb 20x Rtb 20x        TB Row     [place] 15# 30x        BOR   5# 3x10 5# 3x10 np        Standing cane flexion          20x   TB front delt raise     Rtb 3x10        Prone shoulder extension and abduction 20xea         20xea   Ther Activity                                       Gait Training                                       Modalities                                                                                                  "

## 2025-03-18 ENCOUNTER — CLINICAL SUPPORT (OUTPATIENT)
Age: 65
End: 2025-03-18
Payer: COMMERCIAL

## 2025-03-18 VITALS — WEIGHT: 150 LBS | BODY MASS INDEX: 22.81 KG/M2 | TEMPERATURE: 97.8 F

## 2025-03-18 DIAGNOSIS — L20.9 ATOPIC DERMATITIS, UNSPECIFIED TYPE: Primary | ICD-10-CM

## 2025-03-18 PROCEDURE — 96910 PHOTCHMTX TAR&UVB/PTRLTM&UVB: CPT | Performed by: STUDENT IN AN ORGANIZED HEALTH CARE EDUCATION/TRAINING PROGRAM

## 2025-03-18 NOTE — PROGRESS NOTES
[facility] Dermatology Clinic Note     Patient Name: Edmond Ulrich  Encounter Date: 03/18/25    PHOTOTHERAPY    Treatment type: (P) Phototherapy  Visit number: (P) 8  Diagnosis: (P) Atopic Dermatitis  Anatomical location: (P) Trunk, Upper extremity, Lower extremity  Severity: (P) Moderate  BSA: (P) 10%  Treatment schedule: (P) 2x weekly  Reaction: (P) None  Increase %: (P) 10%  mJoules: (P) 588mJ  Total Dosed Cm2: (P) 1:19  Extra UVA: (P) No  Max dose: (P) TBD  Topical: (P) Mineral oil  Topical applied by: (P) Patient  Special shield: (P) Goggles  Tech: (P) Nesah CHAVEZ  Comments: (P) pt applies SPF at home    MAYA Lo

## 2025-03-19 ENCOUNTER — APPOINTMENT (OUTPATIENT)
Dept: PHYSICAL THERAPY | Facility: CLINIC | Age: 65
End: 2025-03-19
Payer: COMMERCIAL

## 2025-03-20 ENCOUNTER — OFFICE VISIT (OUTPATIENT)
Age: 65
End: 2025-03-20

## 2025-03-20 DIAGNOSIS — L20.9 ATOPIC DERMATITIS, UNSPECIFIED TYPE: Primary | ICD-10-CM

## 2025-03-20 NOTE — PROGRESS NOTES
[facility] Dermatology Clinic Note     Patient Name: Edmond Ulrich  Encounter Date: 3/20/2025        PHOTOTHERAPY    Treatment type: Phototherapy  Visit number: 9  Diagnosis: Atopic Dermatitis  Anatomical location: Trunk, Upper extremity, Lower extremity  Severity: Moderate  BSA: 10%  Treatment schedule: 2x weekly  Reaction: None  Increase %: 10%  mJoules: 642mj  Total Dosed Cm2: 1:19  Extra UVA: No  Max dose: TBD  Topical: Mineral oil  Topical applied by: Patient  Special shield: Goggles  Tech: Simi TREJO  Comments: pt applies SPF at home          MAYA Connolly

## 2025-03-24 ENCOUNTER — OFFICE VISIT (OUTPATIENT)
Dept: PHYSICAL THERAPY | Facility: CLINIC | Age: 65
End: 2025-03-24
Payer: COMMERCIAL

## 2025-03-24 ENCOUNTER — APPOINTMENT (OUTPATIENT)
Dept: PHYSICAL THERAPY | Facility: CLINIC | Age: 65
End: 2025-03-24
Payer: COMMERCIAL

## 2025-03-24 DIAGNOSIS — Z98.890 S/P LEFT ROTATOR CUFF REPAIR: ICD-10-CM

## 2025-03-24 DIAGNOSIS — M25.512 ACUTE PAIN OF LEFT SHOULDER: Primary | ICD-10-CM

## 2025-03-24 PROCEDURE — 97140 MANUAL THERAPY 1/> REGIONS: CPT

## 2025-03-24 PROCEDURE — 97112 NEUROMUSCULAR REEDUCATION: CPT

## 2025-03-24 PROCEDURE — 97110 THERAPEUTIC EXERCISES: CPT

## 2025-03-24 NOTE — PROGRESS NOTES
"Daily Note     Today's date: 3/24/2025  Patient name: Edmond Ulrich  : 1960  MRN: [id number]  Referring provider: Darian Glover DO  Dx:   Encounter Diagnosis     ICD-10-CM    1. Acute pain of left shoulder  M25.512       2. S/P left rotator cuff repair  Z98.890             Start Time: 0950  Stop Time: 1048  Total time in clinic (min): 58 minutes    Subjective: Patient states that he is able put pressure at night on L side w/o provocation. Notes       Objective: See treatment diary below      Assessment: No upper trap compensations today, does have some elbow flexion present during shoulder flexion.  Patient required verbal and tactile cueing throughout prescribed exercises for proper execution and dosage.        Plan: Continue per plan of care.      Precautions: Follow protocol L RCR 2724    Access Code: [email or web address]  URL: [email or web address]  Date: 2025  Prepared by: Timur Hutton    POC expires Unit limit Auth Expiration date PT/OT/ST + Visit Limit?   25 6  52                           Visit/Unit Tracking  AUTH Status:  Date 11/29 3/24              Used 1 21              Remaining  51 31                   Manuals 2/12 2/17 2/26 3/3 3/17 3/24  12/26 12/30 1/29   Shoulder PROM  GM GM GM GM LQ  GM  GM                                          Neuro Re-Ed             Scapular isometrics   ABC 2# 1x     20x 20x    Deltoid isometrics        5\" 15x 15x    LT ER 20x Ytb 20x Ytb 20x YTB 20x  YTB 20x Looped YTB BL ER 2x10    20x   Scapular punches 20x 2# 20x 2# 20x 3# 20x 3# 20x 3# 30x       RTC isometrics   5\" 10xea RTC 10x         Subscap medball iso lift     Rmb 2x10 Rmb 3x10                    Ther Ex             Education GM GM   GM   GM GM GM   Elbow AROM  30x   30x 30x flexion       Pendulums     30x 30x ea  30xea 30x    Pball roll out          30x flx and scaption   Beach chair progression 3x10  2# 20x 2# 20x 3# 20x Prone H, T  2x10 pain free 3#    3x10   Pball up wall    " "         Standing shoulder flexion 30x 2# 2x10        20x   SL ER 30x 2# 20x MRE 2x10 np      20x   SL ABD 30x         20x   Pulleys 10\"20 10\"X20 10\"X20 10\"X20 10\"X20 10\"X20    10\"X10   Posterior capsule stretch 30\"x4            Scarecrow 20x            TB IR  RTB 20x 20x rtb Rtb 20x BTB 20x BTB 20x       TB ER  Ytb 20x 20x rtb Rtb 20x Rtb 20x Rtb 20x       TB Row     [place] 15# 30x [place] 15# 30x       BOR   5# 3x10 5# 3x10 np        Standing cane flexion          20x   TB front delt raise     Rtb 3x10 Rtb 3x10       Prone shoulder extension and abduction 20xea         20xea   Ther Activity                                       Gait Training                                       Modalities                                                                                                  "

## 2025-03-25 ENCOUNTER — CLINICAL SUPPORT (OUTPATIENT)
Age: 65
End: 2025-03-25
Payer: COMMERCIAL

## 2025-03-25 DIAGNOSIS — L20.9 ATOPIC DERMATITIS, UNSPECIFIED TYPE: Primary | ICD-10-CM

## 2025-03-25 PROCEDURE — 96910 PHOTCHMTX TAR&UVB/PTRLTM&UVB: CPT | Performed by: STUDENT IN AN ORGANIZED HEALTH CARE EDUCATION/TRAINING PROGRAM

## 2025-03-25 NOTE — PROGRESS NOTES
PHOTOTHERAPY    Treatment type: Phototherapy  Visit number: 10  Diagnosis: Atopic Dermatitis  Anatomical location: Trunk, Upper extremity, Lower extremity  Severity: Moderate  BSA: 10%  Treatment schedule: 2x weekly  Reaction: None  Increase %: 10%  mJoules: 707mJ  Extra UVA: No  Max dose: TBD  Topical: Mineral oil  Topical applied by: Patient  Special shield: Goggles  Tech: Jacqui TREJO  Comments: pt applies spf at home

## 2025-03-26 ENCOUNTER — OFFICE VISIT (OUTPATIENT)
Dept: PHYSICAL THERAPY | Facility: CLINIC | Age: 65
End: 2025-03-26
Payer: COMMERCIAL

## 2025-03-26 DIAGNOSIS — M25.512 ACUTE PAIN OF LEFT SHOULDER: Primary | ICD-10-CM

## 2025-03-26 DIAGNOSIS — Z98.890 S/P LEFT ROTATOR CUFF REPAIR: ICD-10-CM

## 2025-03-26 PROCEDURE — 97112 NEUROMUSCULAR REEDUCATION: CPT | Performed by: PHYSICAL THERAPIST

## 2025-03-26 PROCEDURE — 97110 THERAPEUTIC EXERCISES: CPT | Performed by: PHYSICAL THERAPIST

## 2025-03-26 NOTE — PROGRESS NOTES
"Daily Note     Today's date: 3/26/2025  Patient name: Edmond Ulrich  : 1960  MRN: [id number]  Referring provider: Darian Glover DO  Dx:   Encounter Diagnosis     ICD-10-CM    1. Acute pain of left shoulder  M25.512       2. S/P left rotator cuff repair  Z98.890                      Subjective: Continues to report he is doing well, denies having any symptom exacerbation.  Has been able to perform light golf chipping activities.        Objective: See treatment diary below      Assessment: Progressed patient today with scapular exercises.  Fatigue was noted scapular clocks secondary to weakness.  Continue to progress       Plan: Continue per plan of care.      Precautions: Follow protocol L R 2724    Access Code: [email or web address]  URL: [email or web address]  Date: 2025  Prepared by: Timur Hutton    POC expires Unit limit Auth Expiration date PT/OT/ST + Visit Limit?   25 6  52                           Visit/Unit Tracking  AUTH Status:  Date 11/29 3/24              Used 1 21              Remaining  51 31                   Manuals 2/12 2/17 2/26 3/3 3/17 3/26  12/26 12/30 1/29   Shoulder PROM  GM GM GM GM GM  GM  GM                                          Neuro Re-Ed             Scapular isometrics   ABC 2# 1x     20x 20x    Deltoid isometrics        5\" 15x 15x    LT ER 20x Ytb 20x Ytb 20x YTB 20x  YTB 20x Looped YTB BL ER 2x10    20x   Scapular punches 20x 2# 20x 2# 20x 3# 20x 3# 20x 3# 30x       RTC isometrics   5\" 10xea RTC 10x         Subscap medball iso lift     Rmb 2x10 Rmb 3x10                    Ther Ex             Education GM GM   GM   GM GM GM   Elbow AROM  30x   30x 30x flexion       Pendulums     30x 30x ea  30xea 30x    Pball roll out          30x flx and scaption   Beach chair progression 3x10  2# 20x 2# 20x 3# 20x Prone H, T  2x10 pain free 3#    3x10   Pball up wall             Standing shoulder flexion 30x 2# 2x10        20x   SL ER 30x 2# 20x MRE 2x10 np      " "20x   SL ABD 30x         20x   Pulleys 10\"20 10\"X20 10\"X20 10\"X20 10\"X20 10\"X20    10\"X10   Posterior capsule stretch 30\"x4            Scarecrow 20x            TB IR  RTB 20x 20x rtb Rtb 20x BTB 20x BTB 20x       TB ER  Ytb 20x 20x rtb Rtb 20x Rtb 20x gtb 20x       TB Row     [place] 15# 30x [place] 20# 30x       BOR   5# 3x10 5# 3x10 np 20# 3X10       Scap clock      Gtb 5x       Standing cane flexion          20x   TB front delt raise     Rtb 3x10 Rtb 3x10       Prone shoulder extension and abduction 20xea         20xea   Ther Activity                                       Gait Training                                       Modalities                                                                                                    "

## 2025-03-27 ENCOUNTER — CLINICAL SUPPORT (OUTPATIENT)
Age: 65
End: 2025-03-27

## 2025-03-27 DIAGNOSIS — L20.9 ATOPIC DERMATITIS, UNSPECIFIED TYPE: Primary | ICD-10-CM

## 2025-03-27 NOTE — PROGRESS NOTES
PHOTOTHERAPY    Treatment type: Phototherapy  Visit number: 11  Diagnosis: Atopic Dermatitis  Anatomical location: Trunk, Upper extremity, Lower extremity  Severity: Moderate  BSA: 10%  Treatment schedule: 2x weekly  Reaction: None  Increase %: 10%  mJoules: 781mJ  Total Dosed Cm2: 1:39  Extra UVA: No  Max dose: TBD  Topical: Mineral oil  Topical applied by: Patient  Special shield: Goggles  Tech: Jacqui TREJO  Comments: pt applies SPF at home

## 2025-03-31 ENCOUNTER — OFFICE VISIT (OUTPATIENT)
Dept: PHYSICAL THERAPY | Facility: CLINIC | Age: 65
End: 2025-03-31
Payer: COMMERCIAL

## 2025-03-31 ENCOUNTER — TELEPHONE (OUTPATIENT)
Age: 65
End: 2025-03-31

## 2025-03-31 DIAGNOSIS — M25.512 ACUTE PAIN OF LEFT SHOULDER: Primary | ICD-10-CM

## 2025-03-31 DIAGNOSIS — Z98.890 S/P LEFT ROTATOR CUFF REPAIR: ICD-10-CM

## 2025-03-31 PROCEDURE — 97110 THERAPEUTIC EXERCISES: CPT | Performed by: PHYSICAL MEDICINE & REHABILITATION

## 2025-03-31 PROCEDURE — 97112 NEUROMUSCULAR REEDUCATION: CPT | Performed by: PHYSICAL MEDICINE & REHABILITATION

## 2025-03-31 NOTE — PROGRESS NOTES
"Daily Note     Today's date: 3/31/2025  Patient name: Edmond Ulrich  : 1960  MRN: [id number]  Referring provider: Darian Glover DO  Dx:   Encounter Diagnosis     ICD-10-CM    1. Acute pain of left shoulder  M25.512       2. S/P left rotator cuff repair  Z98.890                    Subjective: Patient offers no new complaints to begin session, continues to report steady improvement.     Objective: See treatment diary below    Assessment: Patient tolerated treatment well overall. Challenged with current activity and resistances. Intermittent cueing for form maintenance and scap position with good carryover. Continue as able nv.     Plan: Continue per plan of care.      Precautions: Follow protocol L R 2724    Access Code: [email or web address]  URL: [email or web address]  Date: 2025  Prepared by: Timur Hutton    POC expires Unit limit Auth Expiration date PT/OT/ST + Visit Limit?   25 6  52                           Visit/Unit Tracking  AUTH Status:  Date 11/29 3/24 3/26 3/31            Used 1 21  23            Remaining  51 31  29                 Manuals 2/12 2/17 2/26 3/3 3/17 3/26 3/31  12/30 1/29   Shoulder PROM  GM GM GM GM GM LH   GM                                          Neuro Re-Ed       3/31      Scapular isometrics   ABC 2# 1x      20x    Deltoid isometrics         15x    LT ER 20x Ytb 20x Ytb 20x YTB 20x  YTB 20x Looped YTB BL ER 2x10 YTB 2x10   20x   Scapular punches 20x 2# 20x 2# 20x 3# 20x 3# 20x 3# 30x 3# 30x      RTC isometrics   5\" 10xea RTC 10x         Subscap medball iso lift     Rmb 2x10 Rmb 3x10 YMB 2x10                   Ther Ex       3/31      Education GM GM   GM    GM GM   Elbow AROM  30x   30x 30x flexion 30x      Pendulums     30x 30x ea T/o  30x    Pball roll out          30x flx and scaption   Beach chair progression 3x10  2# 20x 2# 20x 3# 20x Prone H, T  2x10 pain free 3# Prone T  2x10 pain free 3# H 0# 2x10   3x10   Pball up wall             Standing " "shoulder flexion 30x 2# 2x10        20x   SL ER 30x 2# 20x MRE 2x10 np      20x   SL ABD 30x         20x   Pulleys 10\"20 10\"X20 10\"X20 10\"X20 10\"X20 10\"X20 20x10\"   10\"X10   Posterior capsule stretch 30\"x4            Scarecrow 20x            TB IR  RTB 20x 20x rtb Rtb 20x BTB 20x BTB 20x BTB 20x      TB ER  Ytb 20x 20x rtb Rtb 20x Rtb 20x gtb 20x GTB 20x      TB Row     Oscar 15# 30x [place] 20# 30x [place] 25# 30x      BOR   5# 3x10 5# 3x10 np 20# 3X10 20# 3x10      Scap clock      Gtb 5x GTB 5x      Standing cane flexion          20x   TB front delt raise     Rtb 3x10 Rtb 3x10       Prone shoulder extension and abduction 20xea         20xea   Ther Activity                                       Gait Training                                       Modalities                                                                                                    "

## 2025-03-31 NOTE — TELEPHONE ENCOUNTER
Received call from Patient regarding Phototherapy sessions.     Patient has been communicating via [employer] portal. He prefers Tuesdays and Thursdays, but he's not scheduled for this week, but is scheduled for the following week. Patient is wondering why he's not scheduled this week, can he be scheduled this week?    (Patient also verbalized can't respond directly to Lillie in [facility] so he's been responding to the previous thread.)    Waleska ADAME/Lillie ASH/DERM CLIN[facility] RAJPUT: Please contact Patient regarding Phototherapy sessions for this wees: [phone]

## 2025-04-02 ENCOUNTER — APPOINTMENT (OUTPATIENT)
Dept: PHYSICAL THERAPY | Facility: CLINIC | Age: 65
End: 2025-04-02
Payer: COMMERCIAL

## 2025-04-07 ENCOUNTER — APPOINTMENT (OUTPATIENT)
Dept: PHYSICAL THERAPY | Facility: CLINIC | Age: 65
End: 2025-04-07
Payer: COMMERCIAL

## 2025-04-08 ENCOUNTER — CLINICAL SUPPORT (OUTPATIENT)
Age: 65
End: 2025-04-08
Payer: COMMERCIAL

## 2025-04-08 VITALS
BODY MASS INDEX: 22.73 KG/M2 | DIASTOLIC BLOOD PRESSURE: 80 MMHG | TEMPERATURE: 97.8 F | WEIGHT: 150 LBS | HEART RATE: 68 BPM | OXYGEN SATURATION: 98 % | SYSTOLIC BLOOD PRESSURE: 120 MMHG | HEIGHT: 68 IN

## 2025-04-08 DIAGNOSIS — L20.9 ATOPIC DERMATITIS, UNSPECIFIED TYPE: Primary | ICD-10-CM

## 2025-04-08 PROCEDURE — 96910 PHOTCHMTX TAR&UVB/PTRLTM&UVB: CPT | Performed by: STUDENT IN AN ORGANIZED HEALTH CARE EDUCATION/TRAINING PROGRAM

## 2025-04-08 NOTE — PROGRESS NOTES
[facility] Dermatology Clinic Note     Patient Name: Edmond Ulrich  Encounter Date: 04/08/25    PHOTOTHERAPY    Treatment type: (P) Phototherapy  Visit number: (P) 12  Diagnosis: (P) Atopic Dermatitis  Anatomical location: (P) Trunk, Upper extremity, Lower extremity  Severity: (P) Moderate  BSA: (P) 10%  Treatment schedule: (P) 2x weekly  Reaction: (P) None  Increase %: (P) 10%  mJoules: (P) 860mJ  Total Dosed Cm2: (P) 1:41  Extra UVA: (P) No  Max dose: (P) TBD  Topical: (P) Mineral oil  Topical applied by: (P) Patient  Special shield: (P) Goggles  Tech: (P) Lillie CHAVEZ  Comments: (P) pt applies SPF at home to non-affected areas    MAYA Lo

## 2025-04-09 ENCOUNTER — APPOINTMENT (OUTPATIENT)
Dept: PHYSICAL THERAPY | Facility: CLINIC | Age: 65
End: 2025-04-09
Payer: COMMERCIAL

## 2025-04-10 ENCOUNTER — OFFICE VISIT (OUTPATIENT)
Age: 65
End: 2025-04-10

## 2025-04-10 VITALS — HEIGHT: 68 IN | BODY MASS INDEX: 22.73 KG/M2 | WEIGHT: 150 LBS

## 2025-04-10 DIAGNOSIS — L20.9 ATOPIC DERMATITIS IN ADULT: Primary | ICD-10-CM

## 2025-04-10 NOTE — PROGRESS NOTES
PHOTOTHERAPY    Treatment type: Phototherapy (phototherapy)  Visit number: 13  Diagnosis: Atopic Dermatitis  Anatomical location: Trunk, Upper extremity, Lower extremity  Severity: Moderate  BSA: 10%  Treatment schedule: 2x weekly  Reaction: None  Increase %: 10%  Total Dosed Cm2: 1:41  Extra UVA: No  Max dose: TBD  Topical: Mineral oil  Topical applied by: Patient  Special shield: Goggles  Tech: Gina HENDERSON  Comments: pt applies SPF at home

## 2025-04-14 ENCOUNTER — OFFICE VISIT (OUTPATIENT)
Dept: PHYSICAL THERAPY | Facility: CLINIC | Age: 65
End: 2025-04-14
Payer: COMMERCIAL

## 2025-04-14 DIAGNOSIS — M25.512 ACUTE PAIN OF LEFT SHOULDER: Primary | ICD-10-CM

## 2025-04-14 DIAGNOSIS — Z98.890 S/P LEFT ROTATOR CUFF REPAIR: ICD-10-CM

## 2025-04-14 PROCEDURE — 97112 NEUROMUSCULAR REEDUCATION: CPT | Performed by: PHYSICAL THERAPIST

## 2025-04-14 PROCEDURE — 97110 THERAPEUTIC EXERCISES: CPT | Performed by: PHYSICAL THERAPIST

## 2025-04-14 NOTE — PROGRESS NOTES
"Daily Note     Today's date: 2025  Patient name: Edmond Ulrich  : 1960  MRN: [id number]  Referring provider: Darian Glover DO  Dx:   Encounter Diagnosis     ICD-10-CM    1. Acute pain of left shoulder  M25.512       2. S/P left rotator cuff repair  Z98.890                      Subjective: Patient reports he is doing well, notes having some tightness with IR      Objective: See treatment diary below      Assessment: Continues to progress well with strengthening.  Mild posterior capsule tightness present.  Added posterior capsular stretching for patient today with good response.        Plan: Continue per plan of care.      Precautions: Follow protocol L R 2724    Access Code: [email or web address]  URL: [email or web address]  Date: 2025  Prepared by: Timur Hutton    POC expires Unit limit Auth Expiration date PT/OT/ST + Visit Limit?   25 6  52                           Visit/Unit Tracking  AUTH Status:  Date 11/29 3/24 3/26 3/31            Used 1 21  23            Remaining  51 31  29                 Manuals 2/12 2/17 2/26 3/3 3/17 3/26 3/31 4/14 12/30 1/29   Shoulder PROM  GM GM GM GM GM LH GM  GM                                          Neuro Re-Ed       3/31      Scapular isometrics   ABC 2# 1x      20x    Deltoid isometrics         15x    LT ER 20x Ytb 20x Ytb 20x YTB 20x  YTB 20x Looped YTB BL ER 2x10 YTB 2x10 Ytb 2x10  20x   Scapular punches 20x 2# 20x 2# 20x 3# 20x 3# 20x 3# 30x 3# 30x 3# 30x     RTC isometrics   5\" 10xea RTC 10x         Subscap medball iso lift     Rmb 2x10 Rmb 3x10 YMB 2x10                   Ther Ex       3/31      Education GM GM   GM    GM GM   Elbow AROM  30x   30x 30x flexion 30x      Pendulums     30x 30x ea T/o  30x    Pball roll out          30x flx and scaption   Beach chair progression 3x10  2# 20x 2# 20x 3# 20x Prone H, T  2x10 pain free 3# Prone T  2x10 pain free 3# H 0# 2x10   3x10   Pball up wall             Standing shoulder flexion 30x " "2# 2x10        20x   SL ER 30x 2# 20x MRE 2x10 np      20x   SL ABD 30x         20x   Pulleys 10\"20 10\"X20 10\"X20 10\"X20 10\"X20 10\"X20 20x10\" 20x10\"  10\"X10   Posterior capsule stretch 30\"x4            Scarecrow 20x            Posterior capsule stretch        30\"X4     Strap IR stretch        30\"4     TB IR  RTB 20x 20x rtb Rtb 20x BTB 20x BTB 20x BTB 20x Blck 3x10     TB ER  Ytb 20x 20x rtb Rtb 20x Rtb 20x gtb 20x GTB 20x Gtb 30x     TB Row     [place] 15# 30x [place] 20# 30x [place] 25# 30x [place] 35# 30x     BOR   5# 3x10 5# 3x10 np 20# 3X10 20# 3x10 20# 3x10     Scap clock      Gtb 5x GTB 5x      Standing cane flexion          20x   TB front delt raise     Rtb 3x10 Rtb 3x10  5# 3x10     Prone shoulder extension and abduction 20xea         20xea   Ther Activity                                       Gait Training                                       Modalities                                                                                                      "

## 2025-04-15 ENCOUNTER — CLINICAL SUPPORT (OUTPATIENT)
Age: 65
End: 2025-04-15

## 2025-04-15 VITALS
BODY MASS INDEX: 22.73 KG/M2 | DIASTOLIC BLOOD PRESSURE: 78 MMHG | TEMPERATURE: 98.9 F | HEIGHT: 68 IN | WEIGHT: 150 LBS | OXYGEN SATURATION: 98 % | SYSTOLIC BLOOD PRESSURE: 122 MMHG | HEART RATE: 65 BPM

## 2025-04-15 DIAGNOSIS — L20.9 ATOPIC DERMATITIS, UNSPECIFIED TYPE: Primary | ICD-10-CM

## 2025-04-15 NOTE — PROGRESS NOTES
[facility] Dermatology Clinic Note     Patient Name: Edmond lUrich  Encounter Date: April 15,2025         PHOTOTHERAPY    Treatment type: Phototherapy  Visit number: 14  Diagnosis: Atopic Dermatitis  Anatomical location: Trunk, Upper extremity, Lower extremity  Severity: Moderate  BSA: 10%  Treatment schedule: 2x weekly  Reaction: None  Increase %: 10%  mJoules: 1035  Total Dosed Cm2: 1.59  Extra UVA: No  Max dose: TBD  Topical: Mineral oil  Topical applied by: Patient  Special shield: Goggles  Tech: Gina HENDERSON  Comments: pt applies SPF at home    Gina HENDERSON, DCA

## 2025-04-16 ENCOUNTER — APPOINTMENT (OUTPATIENT)
Dept: PHYSICAL THERAPY | Facility: CLINIC | Age: 65
End: 2025-04-16
Payer: COMMERCIAL

## 2025-04-17 ENCOUNTER — CLINICAL SUPPORT (OUTPATIENT)
Age: 65
End: 2025-04-17
Payer: COMMERCIAL

## 2025-04-17 VITALS
BODY MASS INDEX: 22.73 KG/M2 | DIASTOLIC BLOOD PRESSURE: 80 MMHG | OXYGEN SATURATION: 98 % | HEIGHT: 68 IN | SYSTOLIC BLOOD PRESSURE: 124 MMHG | HEART RATE: 59 BPM | TEMPERATURE: 98.2 F | WEIGHT: 150 LBS

## 2025-04-17 DIAGNOSIS — L20.9 ATOPIC DERMATITIS, UNSPECIFIED TYPE: Primary | ICD-10-CM

## 2025-04-17 PROCEDURE — 96910 PHOTCHMTX TAR&UVB/PTRLTM&UVB: CPT | Performed by: STUDENT IN AN ORGANIZED HEALTH CARE EDUCATION/TRAINING PROGRAM

## 2025-04-17 NOTE — PROGRESS NOTES
[facility] Dermatology Clinic Note     Patient Name: Edmond Ulrich  Encounter Date: April 17,2025         PHOTOTHERAPY    Treatment type: Phototherapy  Visit number: 15  Diagnosis: Atopic Dermatitis  Anatomical location: Trunk, Upper extremity, Lower extremity  Severity: Moderate  BSA: 10%  Treatment schedule: 2x weekly  Reaction: None  Increase %: 10%  mJoules: 1139  Total Dosed Cm2: 2.16  Extra UVA: No  Max dose: TBD  Topical: Mineral oil  Topical applied by: Patient  Special shield: Goggles  Tech: Gina HENDERSON  Comments: pt applies SPF at home      Gina HENDERSON. DCA

## 2025-04-21 ENCOUNTER — OFFICE VISIT (OUTPATIENT)
Dept: PHYSICAL THERAPY | Facility: CLINIC | Age: 65
End: 2025-04-21
Payer: COMMERCIAL

## 2025-04-21 DIAGNOSIS — M25.512 ACUTE PAIN OF LEFT SHOULDER: Primary | ICD-10-CM

## 2025-04-21 DIAGNOSIS — Z98.890 S/P LEFT ROTATOR CUFF REPAIR: ICD-10-CM

## 2025-04-21 PROCEDURE — 97112 NEUROMUSCULAR REEDUCATION: CPT | Performed by: PHYSICAL THERAPIST

## 2025-04-21 PROCEDURE — 97110 THERAPEUTIC EXERCISES: CPT | Performed by: PHYSICAL THERAPIST

## 2025-04-21 NOTE — PROGRESS NOTES
"Daily Note     Today's date: 2025  Patient name: Edmond Ulrich  : 1960  MRN: [id number]  Referring provider: Darian Glover DO  Dx:   Encounter Diagnosis     ICD-10-CM    1. Acute pain of left shoulder  M25.512       2. S/P left rotator cuff repair  Z98.890                      Subjective: Patient reports that he continues to do better.  Helped his brother move some wood yesterday without issue      Objective: See treatment diary below      Assessment: Challenged with curl and press today secondary to weakness.  Otherwise, no exacerbation of symptoms.  Stability of the shoulder remains limited.        Plan: Continue per plan of care.      Precautions: Follow protocol L R 2724    Access Code: [email or web address]  URL: [email or web address]  Date: 2025  Prepared by: Timur Hutton    POC expires Unit limit Auth Expiration date PT/OT/ST + Visit Limit?   25 6  52                           Visit/Unit Tracking  AUTH Status:  Date 11/29 3/24 3/26 3/31            Used 1 21  23            Remaining  51 31  29                 Manuals 2/12 2/17 2/26 3/3 3/17 3/26 3/31 4/21 12/30 1/29   Shoulder PROM  GM GM GM GM GM LH GM  GM                                          Neuro Re-Ed       3/31      Scapular isometrics   ABC 2# 1x      20x    Deltoid isometrics         15x    LT ER 20x Ytb 20x Ytb 20x YTB 20x  YTB 20x Looped YTB BL ER 2x10 YTB 2x10 Ytb 2x10  20x   Scapular punches 20x 2# 20x 2# 20x 3# 20x 3# 20x 3# 30x 3# 30x 3# 30x     RTC isometrics   5\" 10xea RTC 10x         Subscap medball iso lift     Rmb 2x10 Rmb 3x10 YMB 2x10                   Ther Ex       3/31      Education GM GM   GM    GM GM   Elbow AROM  30x   30x 30x flexion 30x      Pendulums     30x 30x ea T/o  30x    Pball roll out          30x flx and scaption   Beach chair progression 3x10  2# 20x 2# 20x 3# 20x Prone H, T  2x10 pain free 3# Prone T  2x10 pain free 3# H 0# 2x10   3x10   Pball up wall             Standing " "shoulder flexion 30x 2# 2x10        20x   SL ER 30x 2# 20x MRE 2x10 np      20x   SL ABD 30x         20x   Pulleys 10\"20 10\"X20 10\"X20 10\"X20 10\"X20 10\"X20 20x10\" 20x10\"  10\"X10   Posterior capsule stretch 30\"x4            Scarecrow 20x            Posterior capsule stretch        30\"X4     Strap IR stretch        30\"4     TB IR  RTB 20x 20x rtb Rtb 20x BTB 20x BTB 20x BTB 20x Blck 3x10     TB ER  Ytb 20x 20x rtb Rtb 20x Rtb 20x gtb 20x GTB 20x Gtb 30x     TB Row     [place] 15# 30x [place] 20# 30x [place] 25# 30x [place] 35# 30x     BOR   5# 3x10 5# 3x10 np 20# 3X10 20# 3x10 20# 3x10     Scap clock      Gtb 5x GTB 5x      Standing cane flexion          20x   TB front delt raise     Rtb 3x10 Rtb 3x10  5# 3x10     Prone shoulder extension and abduction 20xea         20xea   Ther Activity              carry        5# 15ft 6x     Curl and press        5# 2x5     Gait Training                                       Modalities                                                                                                        "

## 2025-04-22 ENCOUNTER — CLINICAL SUPPORT (OUTPATIENT)
Age: 65
End: 2025-04-22
Payer: COMMERCIAL

## 2025-04-22 VITALS
BODY MASS INDEX: 22.73 KG/M2 | WEIGHT: 150 LBS | SYSTOLIC BLOOD PRESSURE: 120 MMHG | OXYGEN SATURATION: 99 % | HEIGHT: 68 IN | HEART RATE: 69 BPM | DIASTOLIC BLOOD PRESSURE: 78 MMHG | TEMPERATURE: 97.8 F

## 2025-04-22 DIAGNOSIS — L20.9 ATOPIC DERMATITIS, UNSPECIFIED TYPE: Primary | ICD-10-CM

## 2025-04-22 PROCEDURE — 96910 PHOTCHMTX TAR&UVB/PTRLTM&UVB: CPT

## 2025-04-22 NOTE — PROGRESS NOTES
[facility] Dermatology Clinic Note     Patient Name: Edmond Ulrich  Encounter Date: 04/2/25     PHOTOTHERAPY    Treatment type: (P) Phototherapy  Visit number: (P) 17  Diagnosis: (P) Atopic Dermatitis  Anatomical location: (P) Trunk, Upper extremity, Lower extremity  Severity: (P) Moderate  BSA: (P) 10%  Treatment schedule: (P) 2x weekly  Reaction: (P) None  Increase %: (P) 10%  mJoules: (P) 1258mJ  Total Dosed Cm2: (P) 231mJ  Extra UVA: (P) No  Max dose: (P) TBD  Topical: (P) Mineral oil  Topical applied by: (P) Patient  Special shield: (P) Goggles  Tech: (P) MAYA Sifuentes  Comments: (P) pt applies SPF to face priorto tx, next tx on 04/29/25    MAYA Lo

## 2025-04-23 ENCOUNTER — APPOINTMENT (OUTPATIENT)
Dept: PHYSICAL THERAPY | Facility: CLINIC | Age: 65
End: 2025-04-23
Payer: COMMERCIAL

## 2025-04-28 ENCOUNTER — OFFICE VISIT (OUTPATIENT)
Dept: PHYSICAL THERAPY | Facility: CLINIC | Age: 65
End: 2025-04-28
Payer: COMMERCIAL

## 2025-04-28 DIAGNOSIS — Z98.890 S/P LEFT ROTATOR CUFF REPAIR: ICD-10-CM

## 2025-04-28 DIAGNOSIS — M25.512 ACUTE PAIN OF LEFT SHOULDER: Primary | ICD-10-CM

## 2025-04-28 PROCEDURE — 97110 THERAPEUTIC EXERCISES: CPT | Performed by: PHYSICAL THERAPIST

## 2025-04-28 PROCEDURE — 97112 NEUROMUSCULAR REEDUCATION: CPT | Performed by: PHYSICAL THERAPIST

## 2025-04-28 NOTE — PROGRESS NOTES
"Daily Note     Today's date: 2025  Patient name: Edmond Ulrich  : 1960  MRN: [id number]  Referring provider: Darian Glover DO  Dx:   Encounter Diagnosis     ICD-10-CM    1. Acute pain of left shoulder  M25.512       2. S/P left rotator cuff repair  Z98.890                      Subjective: Patient continues to report that he is doing better each week.  Denies having any symptom exacerbation.        Objective: See treatment diary below      Assessment: Elevation with resistance to shoulder height is without compensation.  Does have some difficulty with reaching overhead secondary to weakness.  Does continue to have good ROM in all planes.  Some tightness in the pec region remains.        Plan: Continue per plan of care.      Precautions: Follow protocol L RCR 2724    Access Code: [email or web address]  URL: [email or web address]  Date: 2025  Prepared by: Timur Hutton    POC expires Unit limit Auth Expiration date PT/OT/ST + Visit Limit?   25 6  52                           Visit/Unit Tracking  AUTH Status:  Date 11/29 3/24 3/26 3/31            Used 1 21  23            Remaining  51 31  29                 Manuals 2/12 2/17 2/26 3/3 3/17 3/26 3/31 4/28 12/30 1/29   Shoulder PROM  GM GM GM GM GM LH   GM                                          Neuro Re-Ed       3/31      Scapular isometrics   ABC 2# 1x      20x    Deltoid isometrics         15x    LT ER 20x Ytb 20x Ytb 20x YTB 20x  YTB 20x Looped YTB BL ER 2x10 YTB 2x10 With elevation BTB 20x  20x   Scapular punches 20x 2# 20x 2# 20x 3# 20x 3# 20x 3# 30x 3# 30x 3# 30x     RTC isometrics   5\" 10xea RTC 10x         Subscap medball iso lift     Rmb 2x10 Rmb 3x10 YMB 2x10                   Ther Ex       3/31      Education GM GM   GM    GM GM   Elbow AROM  30x   30x 30x flexion 30x      Pendulums     30x 30x ea T/o  30x    Pball roll out          30x flx and scaption   Beach chair progression 3x10  2# 20x 2# 20x 3# 20x Prone H, " "T  2x10 pain free 3# Prone T  2x10 pain free 3# H 0# 2x10   3x10   Pball up wall             Standing shoulder flexion 30x 2# 2x10      3# 30x  20x   SL ER 30x 2# 20x MRE 2x10 np      20x   SL ABD 30x         20x   Pulleys 10\"20 10\"X20 10\"X20 10\"X20 10\"X20 10\"X20 20x10\"   10\"X10   Posterior capsule stretch 30\"x4            Scarecrow 20x            Posterior capsule stretch             Strap IR stretch             TB IR  RTB 20x 20x rtb Rtb 20x BTB 20x BTB 20x BTB 20x Blck 3x10     TB ER  Ytb 20x 20x rtb Rtb 20x Rtb 20x gtb 20x GTB 20x Gtb 30x     TB Row     [place] 15# 30x [place] 20# 30x [place] 25# 30x [place] 35# 30x     BOR   5# 3x10 5# 3x10 np 20# 3X10 20# 3x10 20# 3x10     Scap clock      Gtb 5x GTB 5x Gtb 5x     Standing cane flexion          20x   TB front delt raise     Rtb 3x10 Rtb 3x10       Prone shoulder extension and abduction 20xea         20xea   Ther Activity              carry        5# 15ft 6x     Curl and press        5# 3x5     Gait Training                                       Modalities                                                                                                          "

## 2025-04-29 ENCOUNTER — CLINICAL SUPPORT (OUTPATIENT)
Age: 65
End: 2025-04-29
Payer: COMMERCIAL

## 2025-04-29 VITALS
HEART RATE: 70 BPM | DIASTOLIC BLOOD PRESSURE: 81 MMHG | HEIGHT: 68 IN | OXYGEN SATURATION: 99 % | WEIGHT: 150 LBS | TEMPERATURE: 97.6 F | BODY MASS INDEX: 22.73 KG/M2 | SYSTOLIC BLOOD PRESSURE: 121 MMHG

## 2025-04-29 DIAGNOSIS — L20.9 ATOPIC DERMATITIS, UNSPECIFIED TYPE: Primary | ICD-10-CM

## 2025-04-29 PROCEDURE — 96910 PHOTCHMTX TAR&UVB/PTRLTM&UVB: CPT | Performed by: STUDENT IN AN ORGANIZED HEALTH CARE EDUCATION/TRAINING PROGRAM

## 2025-04-30 ENCOUNTER — APPOINTMENT (OUTPATIENT)
Dept: PHYSICAL THERAPY | Facility: CLINIC | Age: 65
End: 2025-04-30
Payer: COMMERCIAL

## 2025-05-01 ENCOUNTER — CLINICAL SUPPORT (OUTPATIENT)
Age: 65
End: 2025-05-01

## 2025-05-01 VITALS
HEART RATE: 76 BPM | WEIGHT: 150 LBS | BODY MASS INDEX: 22.73 KG/M2 | DIASTOLIC BLOOD PRESSURE: 88 MMHG | TEMPERATURE: 97.8 F | SYSTOLIC BLOOD PRESSURE: 124 MMHG | HEIGHT: 68 IN

## 2025-05-01 DIAGNOSIS — L20.9 ATOPIC DERMATITIS, UNSPECIFIED TYPE: Primary | ICD-10-CM

## 2025-05-01 NOTE — PROGRESS NOTES
[facility] Dermatology Clinic Note     Patient Name: Edmond Ulrich  Encounter Date: May 1,2025     PHOTOTHERAPY    Treatment type: Phototherapy  Visit number: 19  Diagnosis: Atopic Dermatitis  Anatomical location: Trunk, Upper extremity, Lower extremity  Severity: Moderate  BSA: 10%  Treatment schedule: 2x weekly  Reaction: None  Increase %: 10%  Extra UVA: No  Max dose: TBD  Topical: Mineral oil  Topical applied by: Patient  Special shield: Goggles  Tech: MAYA Campbell  Comments: next tx on 05/06/2025      MAYA Campbell

## 2025-05-05 ENCOUNTER — OFFICE VISIT (OUTPATIENT)
Dept: PHYSICAL THERAPY | Facility: CLINIC | Age: 65
End: 2025-05-05
Payer: COMMERCIAL

## 2025-05-05 DIAGNOSIS — M25.512 ACUTE PAIN OF LEFT SHOULDER: Primary | ICD-10-CM

## 2025-05-05 DIAGNOSIS — Z98.890 S/P LEFT ROTATOR CUFF REPAIR: ICD-10-CM

## 2025-05-05 PROCEDURE — 97110 THERAPEUTIC EXERCISES: CPT

## 2025-05-05 PROCEDURE — 97112 NEUROMUSCULAR REEDUCATION: CPT

## 2025-05-05 NOTE — PROGRESS NOTES
"Daily Note     Today's date: 2025  Patient name: Edmond Ulrich  : 1960  MRN: [id number]  Referring provider: Darian Glover DO  Dx:   Encounter Diagnosis     ICD-10-CM    1. Acute pain of left shoulder  M25.512       2. S/P left rotator cuff repair  Z98.890                      Subjective: Patient reports that he sees his MD tomorrow. Notes that overall he continues to progress well and feels good. He is happy with his progress thus far. Minimal soreness from moving a deck umbrella yesterday.       Objective: See treatment diary below      Assessment: Began on the UBE to start session as an active warmup.  Challenged with wall clocks along with the shoulder elevation with the band but able to complete. He has been very compliant with his HEP.       Plan: Continue per plan of care.      Precautions: Follow protocol L RCR 2724    Access Code: [email or web address]  URL: [email or web address]  Date: 2025  Prepared by: Timur Hutton    POC expires Unit limit Auth Expiration date PT/OT/ST + Visit Limit?   25 6  52                           Visit/Unit Tracking  AUTH Status:  Date 11/29 3/24 3/26 3/31            Used 1 21  23            Remaining  51 31  29                 Manuals 2/12 2/17 2/26 3/3 3/17 3/26 3/31 4/28 5/5    Shoulder PROM  GM GM GM GM GM LH                                             Neuro Re-Ed       3/31      Scapular isometrics   ABC 2# 1x          Deltoid isometrics             LT ER 20x Ytb 20x Ytb 20x YTB 20x  YTB 20x Looped YTB BL ER 2x10 YTB 2x10 With elevation BTB 20x With elevation BTB 20x    Scapular punches 20x 2# 20x 2# 20x 3# 20x 3# 20x 3# 30x 3# 30x 3# 30x 3# 30x    RTC isometrics   5\" 10xea RTC 10x         Subscap medball iso lift     Rmb 2x10 Rmb 3x10 YMB 2x10                   Ther Ex       3/31      Education GM GM   GM        Elbow AROM  30x   30x 30x flexion 30x      Pendulums     30x 30x ea T/o      Pball roll out             Beach chair " "progression 3x10  2# 20x 2# 20x 3# 20x Prone H, T  2x10 pain free 3# Prone T  2x10 pain free 3# H 0# 2x10      Pball up wall             Standing shoulder flexion 30x 2# 2x10      3# 30x 3# 30x    SL ER 30x 2# 20x MRE 2x10 np         SL ABD 30x            Pulleys 10\"20 10\"X20 10\"X20 10\"X20 10\"X20 10\"X20 20x10\"      UBE         3'/3'    Posterior capsule stretch 30\"x4            Scarecrow 20x            Posterior capsule stretch             Strap IR stretch             TB IR  RTB 20x 20x rtb Rtb 20x BTB 20x BTB 20x BTB 20x Blck 3x10 Blck 3x10    TB ER  Ytb 20x 20x rtb Rtb 20x Rtb 20x gtb 20x GTB 20x Gtb 30x Gtb 30x    TB Row     [place] 15# 30x [place] 20# 30x [place] 25# 30x [place] 35# 30x [place] 35# 30x    BOR   5# 3x10 5# 3x10 np 20# 3X10 20# 3x10 20# 3x10 20# 3x10    Scap clock      Gtb 5x GTB 5x Gtb 5x Gtb 5x    Standing cane flexion             TB front delt raise     Rtb 3x10 Rtb 3x10       Prone shoulder extension and abduction 20xea                         Ther Activity              carry        5# 15ft 6x 5# 15ft 6x    Curl and press        5# 3x5 5# 3x5    Gait Training                                       Modalities                                                                                                          "

## 2025-05-07 ENCOUNTER — APPOINTMENT (OUTPATIENT)
Dept: PHYSICAL THERAPY | Facility: CLINIC | Age: 65
End: 2025-05-07
Payer: COMMERCIAL

## 2025-05-08 ENCOUNTER — CLINICAL SUPPORT (OUTPATIENT)
Age: 65
End: 2025-05-08
Payer: COMMERCIAL

## 2025-05-08 VITALS
HEIGHT: 68 IN | OXYGEN SATURATION: 98 % | TEMPERATURE: 98 F | BODY MASS INDEX: 22.73 KG/M2 | WEIGHT: 150 LBS | HEART RATE: 66 BPM | DIASTOLIC BLOOD PRESSURE: 80 MMHG | SYSTOLIC BLOOD PRESSURE: 120 MMHG

## 2025-05-08 DIAGNOSIS — L20.9 ATOPIC DERMATITIS, UNSPECIFIED TYPE: Primary | ICD-10-CM

## 2025-05-08 PROCEDURE — 96910 PHOTCHMTX TAR&UVB/PTRLTM&UVB: CPT | Performed by: STUDENT IN AN ORGANIZED HEALTH CARE EDUCATION/TRAINING PROGRAM

## 2025-05-08 NOTE — PROGRESS NOTES
[facility] Dermatology Clinic Note     Patient Name: Edmond Ulrich  Encounter Date: 05/08/25    PHOTOTHERAPY    Treatment type: (P) Phototherapy  Visit number: (P) 20  Diagnosis: (P) Atopic Dermatitis  Anatomical location: (P) Trunk, Upper extremity, Lower extremity  Severity: (P) Moderate  BSA: (P) 10%  Treatment schedule: (P) 2x weekly  Reaction: (P) None  Increase %: (P) 10%  mJoules: (P) 1668mJ  Total Dosed Cm2: (P) 3:18  Extra UVA: (P) No  Max dose: (P) TBD  Topical: (P) Mineral oil  Topical applied by: (P) Patient  Special shield: (P) Goggles  Tech: (P) MAYA Sifuentes  Comments: (P) see Dr. De Leon on 05/13/25, for re-evaluation    MAYA Lo

## 2025-05-12 ENCOUNTER — OFFICE VISIT (OUTPATIENT)
Dept: PHYSICAL THERAPY | Facility: CLINIC | Age: 65
End: 2025-05-12
Payer: COMMERCIAL

## 2025-05-12 DIAGNOSIS — M25.512 ACUTE PAIN OF LEFT SHOULDER: Primary | ICD-10-CM

## 2025-05-12 DIAGNOSIS — Z98.890 S/P LEFT ROTATOR CUFF REPAIR: ICD-10-CM

## 2025-05-12 PROCEDURE — 97112 NEUROMUSCULAR REEDUCATION: CPT | Performed by: PHYSICAL THERAPIST

## 2025-05-12 PROCEDURE — 97110 THERAPEUTIC EXERCISES: CPT | Performed by: PHYSICAL THERAPIST

## 2025-05-12 NOTE — PROGRESS NOTES
"Daily Note     Today's date: 2025  Patient name: Edmond Ulrich  : 1960  MRN: [id number]  Referring provider: Darian Glover DO  Dx:   Encounter Diagnosis     ICD-10-CM    1. Acute pain of left shoulder  M25.512       2. S/P left rotator cuff repair  Z98.890                      Subjective: Patient is feeling good, saw his orthopedic surgeon and all went well.      Objective: See treatment diary below      Assessment: Updated HEP for the patient.  He has good ROM in all planes and is functional with his daily activities.  He is going to try managing at home with his HEP.  Will keep patient chart open for a couple weeks in the event that he needs to follow up with me again.        Plan: Continue per plan of care.      Precautions: Follow protocol L RCR 2724    Access Code: [email or web address]  URL: [email or web address]  Date: 2025  Prepared by: Timur Hutton    POC expires Unit limit Auth Expiration date PT/OT/ST + Visit Limit?   25 6  52                           Visit/Unit Tracking  AUTH Status:  Date 11/29 3/24 3/26 3/31            Used 1 21  23            Remaining  51 31  29                 Manuals 2/12 2/17 2/26 3/3 3/17 3/26 3/31 4/28 5/12    Shoulder PROM  GM GM GM GM GM LH                                             Neuro Re-Ed       3/31      Scapular isometrics   ABC 2# 1x          Deltoid isometrics             LT ER 20x Ytb 20x Ytb 20x YTB 20x  YTB 20x Looped YTB BL ER 2x10 YTB 2x10 With elevation BTB 20x With elevation BTB 20x    Scapular punches 20x 2# 20x 2# 20x 3# 20x 3# 20x 3# 30x 3# 30x 3# 30x 3# 30x    RTC isometrics   5\" 10xea RTC 10x         Subscap medball iso lift     Rmb 2x10 Rmb 3x10 YMB 2x10                   Ther Ex       3/31      Education GM GM   GM        Elbow AROM  30x   30x 30x flexion 30x      Pendulums     30x 30x ea T/o      Pball roll out             Beach chair progression 3x10  2# 20x 2# 20x 3# 20x Prone H, T  2x10 pain free 3# Prone " "T  2x10 pain free 3# H 0# 2x10      Pball up wall             Standing shoulder flexion 30x 2# 2x10      3# 30x 3# 30x    SL ER 30x 2# 20x MRE 2x10 np         SL ABD 30x            Pulleys 10\"20 10\"X20 10\"X20 10\"X20 10\"X20 10\"X20 20x10\"      UBE         3'/3'    Posterior capsule stretch 30\"x4            Scarecrow 20x            Posterior capsule stretch             Strap IR stretch             TB IR  RTB 20x 20x rtb Rtb 20x BTB 20x BTB 20x BTB 20x Blck 3x10 Blck 3x10    TB ER  Ytb 20x 20x rtb Rtb 20x Rtb 20x gtb 20x GTB 20x Gtb 30x Gtb 30x    TB Row     [place] 15# 30x [place] 20# 30x [place] 25# 30x [place] 35# 30x [place] 35# 30x    BOR   5# 3x10 5# 3x10 np 20# 3X10 20# 3x10 20# 3x10 20# 3x10    Scap clock      Gtb 5x GTB 5x Gtb 5x Gtb 5x    Standing cane flexion             TB front delt raise     Rtb 3x10 Rtb 3x10       Prone shoulder extension and abduction 20xea                         Ther Activity              carry        5# 15ft 6x 5# 15ft 6x    Curl and press        5# 3x5 5# 3x5    Gait Training                                       Modalities                                                                                                            "

## 2025-05-13 ENCOUNTER — OFFICE VISIT (OUTPATIENT)
Age: 65
End: 2025-05-13

## 2025-05-13 VITALS
OXYGEN SATURATION: 97 % | HEIGHT: 68 IN | WEIGHT: 150 LBS | DIASTOLIC BLOOD PRESSURE: 78 MMHG | HEART RATE: 58 BPM | SYSTOLIC BLOOD PRESSURE: 120 MMHG | TEMPERATURE: 98.2 F | BODY MASS INDEX: 22.73 KG/M2

## 2025-05-13 DIAGNOSIS — L20.9 ATOPIC DERMATITIS, UNSPECIFIED TYPE: ICD-10-CM

## 2025-05-13 DIAGNOSIS — Z13.89 SCREENING FOR SKIN CONDITION: Primary | ICD-10-CM

## 2025-05-13 NOTE — PROGRESS NOTES
"[facility] Note     Patient Name: Edmond Ulrich  Encounter Date: 05/13/25       Have you been cared for by a [facility] Dermatologist in the last 3 years and, if so, which description applies to you? Yes. I have been here within the last 3 years, and my medical history has NOT changed since that time. I am not of child-bearing potential.     REVIEW OF SYSTEMS:  Have you recently had or currently have any of the following? No changes in my recent health.   PAST MEDICAL HISTORY:  Have you personally ever had or currently have any of the following?  If \"YES,\" then please provide more detail. No changes in my medical history.   HISTORY OF IMMUNOSUPPRESSION: Do you have a history of any of the following:  Systemic Immunosuppression such as Diabetes, Biologic or Immunotherapy, Chemotherapy, Organ Transplantation, Bone Marrow Transplantation or Prednisone?  No     Answering \"YES\" requires the addition of the dotphrase \"IMMUNOSUPPRESSED\" as the first diagnosis of the patient's visit.   FAMILY HISTORY:  Any \"first degree relatives\" (parent, brother, sister, or child) with the following?    No changes in my family's known health.   PATIENT EXPERIENCE:    Do you want the Dermatologist to perform a COMPLETE skin exam today including a clinical examination under the \"bra and underwear\" areas?  NO  If necessary, do we have your permission to call and leave a detailed message on your Preferred Phone number that includes your specific medical information?  Yes      Allergies   Allergen Reactions    Cefixime Other (See Comments)    Ciprofloxacin GI Intolerance    Clarithromycin GI Intolerance    Levofloxacin GI Intolerance    Oxycodone GI Intolerance    Medical Tape Rash    Morphine Rash      Current Outpatient Medications:     alfuzosin (UROXATRAL) 10 mg 24 hr tablet, Take 10 mg by mouth daily, Disp: , Rfl:     Ascorbic Acid, Vitamin C, (VITAMIN C) 100 MG tablet, Vitamin C, Disp: , Rfl:     clobetasol " (TEMOVATE) 0.05 % cream, Apply topically 2 (two) times a day For two weeks to affected areas. Avoid face, groin, axilla, Disp: 45 g, Rfl: 1    cycloSPORINE (Restasis) 0.05 % ophthalmic emulsion, Restasis 0.05 % eye drops in a dropperette  INSTILL 1 DROP INTO BOTH EYES TWICE A DAY, Disp: , Rfl:     Dupilumab (Dupixent) 300 MG/2ML SOPN, Inject 300mg (1 pen) every other week for maintenance dose, Disp: 4 mL, Rfl: 11    Dupixent 300 MG/2ML SOAJ, , Disp: , Rfl:     fluticasone (FLONASE) 50 mcg/act nasal spray, 2 sprays into each nostril daily, Disp: , Rfl:     hydrOXYzine HCL (ATARAX) 25 mg tablet, Take 1 tablet (25 mg total) by mouth daily at bedtime (Patient not taking: Reported on 3/14/2024), Disp: 30 tablet, Rfl: 0    ibuprofen (MOTRIN) 600 mg tablet, Take 600 mg by mouth every 8 (eight) hours, Disp: , Rfl:     ketoconazole (NIZORAL) 2 % shampoo, ketoconazole 2 % shampoo  SHAMPOO HAIR  2-3 TIMES A WEEK (Patient not taking: Reported on 11/29/2023), Disp: , Rfl:     Multiple Vitamin (MULTIVITAMIN ADULT PO), multivitamin, Disp: , Rfl:     mupirocin (BACTROBAN) 2 % ointment, mupirocin 2 % topical ointment  APPLY INTRANASALLY TWICE A DAY  as directed (Patient not taking: Reported on 10/4/2023), Disp: , Rfl:     ondansetron (ZOFRAN) 4 mg tablet, take 1 tablet by mouth every 8 hours as needed for nausea and vomiting, Disp: , Rfl:     oxyCODONE-acetaminophen (PERCOCET) 5-325 mg per tablet, TAKE 1 TABLET BY MOUTH EVERY 6 HRS AS NEEDED FOR SEVERE PAIN (SCORE 7-10). MAX 4 TABLETS/DAY, Disp: , Rfl:     rosuvastatin (CRESTOR) 5 mg tablet, Take 5 mg by mouth daily, Disp: , Rfl:     solifenacin (VESICARE) 10 MG tablet, Take 10 mg by mouth daily, Disp: , Rfl:     tamsulosin (FLOMAX) 0.4 mg, TAKE 1 CAPSULE BY MOUTH EVERY DAY AT NIGHT, Disp: , Rfl:               Whom besides the patient is providing clinical information about today's encounter?   NO ADDITIONAL HISTORIAN (patient alone provided history)    Physical Exam and  "Assessment/Plan by Diagnosis:    Chief complaint: Patient is a 63 y/o male present for Phototherapy and follow up of Atopic Dermatitis on the Palms of Hands    ATOPIC DERMATITIS (\"childhood Eczema\")    Physical Exam:  Anatomic Location Affected:  Palms of Hands  Morphological Description:  erythema  Body Surface Area Today:  less than 3%  Overall Severity: mild  Pertinent Positives:  Pertinent Negatives:    Additional History of Present Condition:  Currently completed 21 treatments of Photo and on Dupixent    Assessment and Plan:  Based on a thorough discussion of this condition and the management approach to it (including a comprehensive discussion of the known risks, side effects and potential benefits of treatment), the patient (family) agrees to implement the following specific plan:  Stop Phototherapy for the Summer  Restart Photo in the Fall/Winter  Continue Dupixent     Assessment and Plan:   Atopic Dermatitis is a chronic, itchy skin condition that is very common in children but may occur at any age. It is also known as “eczema” or “atopic eczema.” It is the most common form of dermatitis.    Atopic dermatitis usually occurs in people who have an “atopic tendency.”  This means they may develop any or all of these closely linked conditions:  Atopic dermatitis, asthma, hay fever (allergic rhinitis), eosinophilic esophagitis, and gastroenteritis.  Often these conditions run within families with a parent, child or sibling also affected. A family history of asthma, eczema or hay fever is particularly useful in diagnosing atopic dermatitis in infants.    Atopic dermatitis arises because of a complex interaction of genetic and environmental factors. These include defects in skin barrier function making the skin more susceptible to irritation by soap and other contact irritants, the weather, temperature and non-specific triggers.  There is also an element of immune system dysregulation that is often present.  By " "definition, it is chronic and has a \"waxing-waning\" nature; flares should be expected but with good education and treatment strategies can be minimized.    Some specific tips we discussed:  Dry skin care.  Using only mild cleansers (hypoallergenic and without fragrances) and fragrance free detergent (not “unscented” products which contain a masking agent); we discussed avoiding irritants/fragranced products.  The importance of regular application of moisturizers daily (at least 3 times a day)  The known and theoretical side effects of steroids at length, including but not limited to atrophy of skin and increased pressure in eye (glaucoma) and clouding of the eye's lens (cataracts) if used in or around the eye for extended durations.  The specific over-the-counter interventions and medications.  Side effects, risks and benefits of topical and oral medications discussed.  After lengthy discussion of etiology and treatment options, we decided to implement the following personalized treatment plan:      EDUCATION AS INTERVENTION!    WHAT IS ATOPIC DERMATITIS?  Atopic dermatitis (also called “eczema”) is a condition of the skin where the skin is dry, red, and itchy.  The main function of the skin is to provide a barrier from the environment and is also the first defense of the immune system.    In atopic dermatitis the skin barrier is decreased or disrupted, and the skin is easily irritated.  As a result, moisture escapes the skin more easily, and environmental allergens and microbes can enter the skin more easily.  Consequently, the skin's immune system is altered.  If there are increased allergic type cells in the skin, the skin may become red and “hyper-excitable.” This leads to itching and a subsequent rash.    WHY DO PEOPLE GET ATOPIC DERMATITIS?  There is no single answer because many factors are involved.  It is likely a combination of genetic makeup and environmental triggers and/or exposures. Excessive drying or " sweating of the skin, Irritating soaps, dust mites, and pet dander are some of the more common triggers.  There is no blood test that can be done to confirm this diagnosis. The history and appearance of the skin is usually sufficient for a diagnosis. However, in some cases if the rash does not fit with the history or respond appropriately to treatment, a skin biopsy may be helpful.  Many children do outgrow atopic dermatitis or get better; however, many continue to have sensitive skin into adulthood.  Asthma and hay fever are often seen in many patients with atopic dermatitis; however, asthma flares do not necessarily occur at the same time as skin flares.     PREVENTING FLARES OF ATOPIC DERMATITIS  The first step is to maintain the skin's barrier function.  Keep the skin well moisturized.  Avoid irritants and triggers.  Use prescribed medicine when there are red or rough areas to help the skin to return to normal as quickly as possible.  Try to limit scratching.     If you keep the skin well moisturized, and avoid coming in contact with things you know irritate your child's skin, there will be less flares.  However, some flares of atopic dermatitis are beyond your control.  You should work with your health care provider to come up with a plan that minimizes flares while minimizing long term use of medications that suppress the immune system.        WHAT ARE SOME OF THE TRIGGERS?  Triggers are different for different people. The most common triggers are:  Heat and sweat for some individuals, cold weather for others.  House dust mites, pet fur.  Wool; synthetic fabrics like nylon; dyed fabrics.  Tobacco smoke   Fragrances in: shampoos, soaps, lotions, laundry detergents, fabric softeners.  Saliva or prolonged exposure to water.    WHAT ABOUT FOOD ALLERGIES?  This is a very controversial topic, as many believe that food allergies are responsible for skin flares. In some cases, specific foods may cause worsening of  atopic dermatitis; however this occurs in a minority of cases and usually happens within a few hours of ingestion. While food allergy is more common in children with eczema, foods are specific triggers for flares in only a small percentage of children.  If you notice that the skin flares after certain foods you can see if eliminating one food at time makes a difference, as long as your child can still enjoy a well-balanced diet.   There are blood (RAST) and skin (PRICK) tests that can check for allergies, but they are often positive in children who are not truly allergic. Therefore it is important that you work with your allergist and dermatologist to determine which foods are relevant and causing true symptoms.  Extreme food elimination diets without the guidance of your doctor, which have become more popular in recent years, may even result in worsening of the skin rash due to malnutrition and avoidance of essential nutrients.    TREATMENT  Treatments are aimed at minimizing exposure to irritating factors and decreasing  the skin inflammation which results in an itchy rash.There are many different treatment options, which depend on your child's rash, its location, and severity.  Topical treatments include corticosteroids and steroid-like creams such as Protopic, Elidel, and Eucrisa, which are believed to not thin the skin.  Please read the discussions below regarding risks and benefits of all of these creams.    Occasionally bacterial or viral infections can occur which flare the skin and require oral and/or topical antibiotics or antivirals. In some cases bleach baths 2-3 times weekly can be helpful to prevent recurrent infection.    For severe disease, strong oral medications such as corticosteroids, methotrexate or azathioprine (Imuran) may be needed. These medications require close monitoring and follow-up. You should discuss the risks/ benefits/alternatives of these medications with your health care provider  to come up with the best treatment plan for your child.    1) Use moisturizer all over the entire body at least THREE TIMES a day.  This keeps the skin moisturized to restore the barrier function.  Find a cream or ointment that your child likes - this is the most important.  The medicines do not work in the bottle.  The thicker the moisturizer, generally the better barrier it provides.  Ointments often moisturize better than creams; and creams work better than lotions.  Lotions are more useful during the summer when thick greasy ointments are uncomfortable.  If you put moisturizer on the skin after bathing, while the skin is damp, it is twice as effective.  The moisturizer provides a seal holding the water in the skin.  You may bathe your child in warm - not hot - water, for short periods of time (no more than 5-10 minutes at a time) once a day if they like.  Lightly pat your child dry with a towel and, while the skin is still damp, (within 3 minutes) apply a moisturizer from head to toe.  If your child is using a medicated cream, apply it and allow it to absorb completely BEFORE you apply the moisturizer.    2) Apply the prescription medication TWICE A DAY to only the red, rough areas on the skin OR AS DIRECTED BY YOUR HEALTH CARE PROVIDER  Put the medication on your fingers and gently rub it into the areas.  Usually the medicine will help an area within a few days time.  Try to put the medicine on for two days after you have noticed that the redness is no longer present; this will help the redness from returning.  The severity of the rash and the strength and usage of the medication will determine how quickly you see improvement.    It is important that you do not overuse steroid creams, and if you notice a thin, shiny appearance to the skin or broken blood vessels, you should stop using the cream and consult your health care provider regarding possible overuse/overthinning of the skin.  The face, armpits and  "groin have particularly thin and sensitive skin and are therefore most at risk for bad results if steroids are over-used in these sites.      3) Avoid triggers.    Some children have specific things that trigger itching and rashes, while others may have none that can be identified.  It may require a little bit of trial and error to see what applies to your child.  Also, triggers can change over time for your child. The most common triggers are listed above; start with these.  Avoid the use of fabric softeners in the washing machine or dryer sheets (unless they are fragrance-free).  Try to use laundry detergents, soaps and shampoos that are fragrance-free. You may find it helpful to double-rinse your clothes.  Some children are sensitive to house dust mites and they may benefit from a plastic mattress wrap.  While food allergy is more common in children with eczema, foods are specific triggers for flares in only a small percentage of children.  If you notice that the skin flares after certain foods you can see if eliminating one food at time makes a difference, as long as your child can still enjoy a well-balanced diet.     4) Consider using a medication like an anti-histamine by mouth to help control the itching.    Scratching only makes the skin more reactive and the barrier function even more disrupted.  It can cause both children and their parents to lose sleep!  There are different types of anti-itch medications.  Some cause more drowsiness than others.  Both types are acceptable depending on your child and your preference.  Start with Benadryl and if that does not work, ask for a prescription “antihistamine.”    5) About the prescription creams:  Corticosteroid creams and ointments (generally things with \"-one\" or \"yonas\" on the end of their names):  The strength of the cream or ointment depends on the name of the active ingredient.  The numbers at the end do not indicate the relative strength.  Thus " triamcinolone 0.1% ointment, considered a mid-strength corticosteroid, is much stronger than hydrocortisone 1% even though the number following the name is much lower.  Topical corticosteroids are very effective in treating atopic dermatitis.  When used in the manner prescribed (to rashy areas of skin and for no more than a few weeks at a time to any one area) they are very safe.  These are corticosteroids and are anti-inflammatory, not the “anabolic steroids” like those used illegally by some athletes.     Topical non-steroid creams and ointments (immunomodulators):  These creams and ointments are also called topical calcineurin inhibitors (TCIs).  These include Protopic ointment and Elidel cream. Crisaborole 2% (Eucrisa) is a prescription ointment that targets an enzyme called PDE4 (phosphodiesterase 4).  It is used on the skin topically to treat mild-to-moderate eczema in adults and children 2 years of age and older.  In total, these nonsteroidal prescriptions are used to help decrease itching and redness in the skin.  They are not as strong as most steroid creams; however, it is believed that they do not thin the skin when overused.  They are generally used as second-line medications, though they may be used alone or in conjunction with topical steroids.  In sensitive areas such as the face, underarms or groin, they are often recommended.  They can sting inflamed skin, but are generally well tolerated once the skin is healing.    The FDA placed a “black-box” warning on both Elidel and Protopic in 2006 based on animal studies using the medications.  Some animals developed skin cancer and lymphoma.  Subsequently, the FDA released a statement that there is no causal relationship between the two medications and cancer.  Because of this concern, there are ongoing studies to evaluate this relationship in humans.  So far, there are studies that support the safety of these medications.  One showed that the rates of  cancer in patient using these medications topically were less than the rates of the general population and another showed that in patient's using the medication over a large area of the body, the levels of the medication in the blood was undetectable.    As for Eucrisa, this product is only approved for the topical treatment of mild-to-moderate eczema in patients 2 years of age and older; use of the medication in kids younger than 2 is considered “off label” and has not been formally studied.  Burning and stinging are the most commonly reported side effects of this medication.  Rarely, this product has been known to cause hives and hypersensitivity reactions; discontinue its use if you develop severe itching, swelling, or redness in the area of application.    PHOTOTHERAPY    Treatment type: Phototherapy  Visit number: 21  Diagnosis: Atopic Dermatitis  Anatomical location: Trunk, Upper extremity, Lower extremity  Severity: Moderate  BSA: 10%  Treatment schedule: 2x weekly  Reaction: None  Increase %: 10%  mJoules: [id number]  Total Dosed Cm2: 3:45  Extra UVA: No  Max dose: TBD  Topical: Mineral oil  Topical applied by: Patient  Special shield: [employer]  Tech: MAYA Sifuentes    Scribe Attestation      I,:  Lillie Zelaya MA am acting as a scribe while in the presence of the attending physician.:       I,:  Carlos De Leon DO personally performed the services described in this documentation    as scribed in my presence.:

## 2025-05-14 ENCOUNTER — APPOINTMENT (OUTPATIENT)
Dept: PHYSICAL THERAPY | Facility: CLINIC | Age: 65
End: 2025-05-14
Payer: COMMERCIAL

## 2025-05-19 ENCOUNTER — APPOINTMENT (OUTPATIENT)
Dept: PHYSICAL THERAPY | Facility: CLINIC | Age: 65
End: 2025-05-19
Payer: COMMERCIAL

## 2025-05-21 ENCOUNTER — APPOINTMENT (OUTPATIENT)
Dept: PHYSICAL THERAPY | Facility: CLINIC | Age: 65
End: 2025-05-21
Payer: COMMERCIAL

## 2025-05-26 ENCOUNTER — APPOINTMENT (OUTPATIENT)
Dept: PHYSICAL THERAPY | Facility: CLINIC | Age: 65
End: 2025-05-26
Payer: COMMERCIAL

## 2025-05-28 ENCOUNTER — APPOINTMENT (OUTPATIENT)
Dept: PHYSICAL THERAPY | Facility: CLINIC | Age: 65
End: 2025-05-28
Payer: COMMERCIAL

## 2025-05-28 DIAGNOSIS — L20.9 ATOPIC DERMATITIS, UNSPECIFIED TYPE: Primary | ICD-10-CM

## 2025-05-29 RX ORDER — DUPILUMAB 300 MG/2ML
INJECTION, SOLUTION SUBCUTANEOUS
Qty: 4 ML | Refills: 0 | Status: SHIPPED | OUTPATIENT
Start: 2025-05-29

## 2025-06-13 ENCOUNTER — TELEPHONE (OUTPATIENT)
Dept: DERMATOLOGY | Facility: CLINIC | Age: 65
End: 2025-06-13

## 2025-06-17 ENCOUNTER — TELEPHONE (OUTPATIENT)
Age: 65
End: 2025-06-17

## 2025-06-17 ENCOUNTER — OFFICE VISIT (OUTPATIENT)
Dept: DERMATOLOGY | Facility: CLINIC | Age: 65
End: 2025-06-17
Payer: COMMERCIAL

## 2025-06-17 VITALS — TEMPERATURE: 98.3 F | HEIGHT: 68 IN | WEIGHT: 150 LBS | BODY MASS INDEX: 22.73 KG/M2

## 2025-06-17 DIAGNOSIS — D22.5 MULTIPLE BENIGN MELANOCYTIC NEVI OF UPPER AND LOWER EXTREMITIES AND TRUNK: ICD-10-CM

## 2025-06-17 DIAGNOSIS — D18.01 CHERRY ANGIOMA: ICD-10-CM

## 2025-06-17 DIAGNOSIS — L85.3 XEROSIS OF SKIN: ICD-10-CM

## 2025-06-17 DIAGNOSIS — D22.72 MULTIPLE BENIGN MELANOCYTIC NEVI OF UPPER AND LOWER EXTREMITIES AND TRUNK: ICD-10-CM

## 2025-06-17 DIAGNOSIS — D22.62 MULTIPLE BENIGN MELANOCYTIC NEVI OF UPPER AND LOWER EXTREMITIES AND TRUNK: ICD-10-CM

## 2025-06-17 DIAGNOSIS — R21 RASH: ICD-10-CM

## 2025-06-17 DIAGNOSIS — L82.1 SK (SEBORRHEIC KERATOSIS): ICD-10-CM

## 2025-06-17 DIAGNOSIS — D22.71 MULTIPLE BENIGN MELANOCYTIC NEVI OF UPPER AND LOWER EXTREMITIES AND TRUNK: ICD-10-CM

## 2025-06-17 DIAGNOSIS — L81.4 LENTIGINES: ICD-10-CM

## 2025-06-17 DIAGNOSIS — L20.9 ATOPIC DERMATITIS IN ADULT: Primary | ICD-10-CM

## 2025-06-17 DIAGNOSIS — D22.61 MULTIPLE BENIGN MELANOCYTIC NEVI OF UPPER AND LOWER EXTREMITIES AND TRUNK: ICD-10-CM

## 2025-06-17 DIAGNOSIS — Z12.83 SKIN CANCER SCREENING: ICD-10-CM

## 2025-06-17 DIAGNOSIS — L20.9 ATOPIC DERMATITIS, UNSPECIFIED TYPE: ICD-10-CM

## 2025-06-17 PROCEDURE — 99214 OFFICE O/P EST MOD 30 MIN: CPT | Performed by: DERMATOLOGY

## 2025-06-17 RX ORDER — CLOBETASOL PROPIONATE 0.5 MG/G
CREAM TOPICAL 2 TIMES DAILY
Qty: 45 G | Refills: 1 | Status: SHIPPED | OUTPATIENT
Start: 2025-06-17

## 2025-06-17 RX ORDER — DUPILUMAB 300 MG/2ML
INJECTION, SOLUTION SUBCUTANEOUS
Qty: 4 ML | Refills: 0 | Status: CANCELLED | OUTPATIENT
Start: 2025-06-17

## 2025-06-17 NOTE — Clinical Note
Please start re-authorization for patients Dupixent:  Dupixent 300 MG/2ML SOAJ INJECT 1 PEN SUBCUTANEOUSLY EVERY OTHER WEEK

## 2025-06-17 NOTE — TELEPHONE ENCOUNTER
PA for Dupixent 300mg pen SUBMITTED to [facility]     via    [x]CMM-KEY: [id number]  []Surescripts-Case ID #   []Availity-Auth ID # NDC #   []Faxed to plan   []Other website   []Phone call Case ID #     [x]PA sent as URGENT    All office notes, labs and other pertaining documents and studies sent. Clinical questions answered. Awaiting determination from insurance company.     Turnaround time for your insurance to make a decision on your Prior Authorization can take 7-21 business days.

## 2025-06-17 NOTE — PROGRESS NOTES
"[facility] Note     Patient Name: Edmond Ulrich  Encounter Date: 06/17/2025       Have you been cared for by a [facility] Dermatologist in the last 3 years and, if so, which description applies to you? Yes. I have been here within the last 3 years, and my medical history has NOT changed since that time. I am not of child-bearing potential.     REVIEW OF SYSTEMS:  Have you recently had or currently have any of the following? No changes in my recent health.   PAST MEDICAL HISTORY:  Have you personally ever had or currently have any of the following?  If \"YES,\" then please provide more detail. No changes in my medical history.   HISTORY OF IMMUNOSUPPRESSION: Do you have a history of any of the following:  Systemic Immunosuppression such as Diabetes, Biologic or Immunotherapy, Chemotherapy, Organ Transplantation, Bone Marrow Transplantation or Prednisone?  YES, patient is on Dupixent injections      Answering \"YES\" requires the addition of the dotphrase \"IMMUNOSUPPRESSED\" as the first diagnosis of the patient's visit.   FAMILY HISTORY:  Any \"first degree relatives\" (parent, brother, sister, or child) with the following?    No changes in my family's known health.   PATIENT EXPERIENCE:    Do you want the Dermatologist to perform a COMPLETE skin exam today including a clinical examination under the \"bra and underwear\" areas?  Yes- did not check in underwear   If necessary, do we have your permission to call and leave a detailed message on your Preferred Phone number that includes your specific medical information?  Yes      Allergies[1] Current Medications[2]          Whom besides the patient is providing clinical information about today's encounter?   NO ADDITIONAL HISTORIAN (patient alone provided history)    Physical Exam and Assessment/Plan by Diagnosis:      ATOPIC DERMATITIS     Physical Exam:  Anatomic Location Affected:  hands  Morphological Description:  some peeling  Severity: " "moderate  Pertinent Positives:  Pertinent Negatives:    Additional History of Present Condition:  Patient was doing phototherapy and is currently on Dupixent and Clobetasol 0.05% cream. Patient reports doing well on Dupixent and using Clobetasol cream as needed.     Assessment and Plan:  Based on a thorough discussion of this condition and the management approach to it (including a comprehensive discussion of the known risks, side effects and potential benefits of treatment), the patient (family) agrees to implement the following specific plan:  Continue Clobetasol 0.05% cream as needed  Will reauthorize Dupixent   Continue Dupixent injections every other week      Assessment and Plan:   Atopic Dermatitis is a chronic, itchy skin condition that is very common in children but may occur at any age. It is also known as “eczema” or “atopic eczema.” It is the most common form of dermatitis.    Atopic dermatitis usually occurs in people who have an “atopic tendency.”  This means they may develop any or all of these closely linked conditions:  Atopic dermatitis, asthma, hay fever (allergic rhinitis), eosinophilic esophagitis, and gastroenteritis.  Often these conditions run within families with a parent, child or sibling also affected. A family history of asthma, eczema or hay fever is particularly useful in diagnosing atopic dermatitis in infants.    Atopic dermatitis arises because of a complex interaction of genetic and environmental factors. These include defects in skin barrier function making the skin more susceptible to irritation by soap and other contact irritants, the weather, temperature and non-specific triggers.  There is also an element of immune system dysregulation that is often present.  By definition, it is chronic and has a \"waxing-waning\" nature; flares should be expected but with good education and treatment strategies can be minimized.    Your treatment plan  Using only mild cleansers (hypoallergenic " "and without fragrances) and fragrance free detergent (not “unscented” products which contain a masking agent)  Apply moisturizer to entire body at least 2 times a day  Use the above listed medication to affected areas twice a day for a full 2 days after the rash has cleared  Take on over the counter antihistamine like diphenhydramine before bed if the itching is keeping you awake              ATOPIC DERMATITIS FAQS    WHAT IS ATOPIC DERMATITIS?  Atopic dermatitis (also called “eczema”) results from a weak skin barrier and an over active immune system.  The weak skin barrier allows things to get into the skin and then the immune system has an intense reaction to this substances.  The result is itchy red patches anywhere on the body.    WHO GETS ATOPIC DERMATITIS?  There is no single answer because many factors are involved.  It is likely a combination of genetic makeup and environmental triggers and/or exposures.  People with atopic dermatitis are prone to other types of \"atopy\".  They are at increased risk for food allergies, Asthma and hay fever and there is often a family history of these problems as well.      WHAT ABOUT FOOD ALLERGIES?  This is a very controversial topic, as many believe that food allergies are responsible for skin flares. In some cases, specific foods may cause worsening of atopic dermatitis; however this occurs in a minority of cases and usually happens within a few hours of ingestion. While food allergy is more common in children with eczema, foods are specific triggers for flares in only a small percentage of children.  If you notice that the skin flares after certain foods you can see if eliminating one food at time makes a difference, as long as your child can still enjoy a well-balanced diet.   There are blood (RAST) and skin (PRICK) tests that can check for allergies, but they are often positive in children who are not truly allergic. Therefore it is important that you work with your " allergist and dermatologist to determine which foods are relevant and causing true symptoms.  Extreme food elimination diets without the guidance of your doctor, which have become more popular in recent years, may even result in worsening of the skin rash due to malnutrition and avoidance of essential nutrients.      WHY SO MUCH MOISTURIZER?  This is the 1st step and most important part of treatment.  This helps maintaint the skin's barrier function and prevent flares. Creams and ointments are preferable over lotions.  Aveeno eczema relief and Eucerin eczema relief  and cerave are all good ones to start with. It is best to put  moisturizer on directly after bathing, while the skin is damp, it is twice as effective then.  You may bathe daily.  Use warm - not hot - water, for short periods of time (5-10 minutes at a time) Dry off by Lightly patting the skin with a towel and not rubbing. While the skin is still damp, (within 3 minutes) apply any medications to the rashy areas 1st and then moisturize the entire body. It's best to apply the medication 1st but if the medications sting, moisturizer can be applied 1st.    WHY USE THE MEDICATION FOR AN EXTRA 2 DAYS AFTER THE RASH IS GONE?  Sometimes the rash may appear to be gone, but there are still microscopic changes in the skin.  Using the medication and extra 2 days will ensure the inflammation has resolve and make the rash less likely to return quickly.        WHAT ARE TRIGGERS?  Occasionally there are specific things that trigger itching and rashes, but in many cases may none can be identified.  The most common triggers are:  Heat and sweat for some individuals, cold weather for others.  House dust mites, pet fur.  Wool; synthetic fabrics like nylon; dyed fabrics.  Tobacco smoke   Fragrances in: shampoos, soaps, lotions, laundry detergents, fabric softeners.  Saliva or prolonged exposure to water.   start with these.  Avoid the use of fabric softeners in the washing  "machine or dryer sheets (unless they are fragrance-free).  Try to use laundry detergents, soaps and shampoos that are fragrance-free. You may find it helpful to double-rinse your clothes.  Some children are sensitive to house dust mites and they may benefit from a plastic mattress wrap.  While food allergy is more common in children with eczema, foods are specific triggers for flares in only a small percentage of children.  If you notice that the skin flares after certain foods you can see if eliminating one food at time makes a difference, as long as your child can still enjoy a well-balanced diet.     4) WHAT DOES THE ANTIHISTAMINE DO?   Antihistamines help you not to scratch. Scratching only makes the skin more reactive and the barrier function even more disrupted.  It can cause both children and their parents to lose sleep!  There are different types of anti-itch medications.  Some cause more drowsiness than others.  Both types are acceptable depending on your child and your preference.  Start with Benadryl and if that does not work, ask for a prescription “antihistamine.”    5) ARE THERE ANY SIDE EFFECTS TO WORRY ABOUT?  Corticosteroid creams and ointments (generally things with \"-one\" or \"yonas\" on the end of their names):  The strength of the cream or ointment depends on the name of the active ingredient.  The numbers at the end do not indicate the relative strength.  Thus triamcinolone 0.1% ointment, considered a mid-strength corticosteroid, is much stronger than hydrocortisone 1% even though the number following the name is much lower.  Topical corticosteroids are very effective in treating atopic dermatitis.  When used in the manner prescribed (to rashy areas of skin and for no more than a few weeks at a time to any one area) they are very safe.  These are corticosteroids and are anti-inflammatory, not the “anabolic steroids” like those used illegally by some athletes. It is important that you do not overuse " steroid creams, and if you notice a thin, shiny appearance to the skin or broken blood vessels, you should stop using the cream and consult your health care provider regarding possible overuse/overthinning of the skin.  The face, armpits and groin have particularly thin and sensitive skin and are therefore most at risk for bad results if steroids are over-used in these sites.      Topical non-steroid creams and ointments (immunomodulators):  These creams and ointments are also called topical calcineurin inhibitors (TCIs).  These include Protopic ointment and Elidel cream. Crisaborole 2% (Eucrisa) is a prescription ointment that targets an enzyme called PDE4 (phosphodiesterase 4).  It is used on the skin topically to treat mild-to-moderate eczema in adults and children 2 years of age and older.  In total, these nonsteroidal prescriptions are used to help decrease itching and redness in the skin.  They are not as strong as most steroid creams; however, it is believed that they do not thin the skin when overused.  They are generally used as second-line medications, though they may be used alone or in conjunction with topical steroids.  In sensitive areas such as the face, underarms or groin, they are often recommended.  They can sting inflamed skin, but are generally well tolerated once the skin is healing.    The FDA placed a “black-box” warning on both Elidel and Protopic in 2006 based on animal studies using the medications.  Some animals developed skin cancer and lymphoma.  Subsequently, the FDA released a statement that there is no causal relationship between the two medications and cancer.  Because of this concern, there are ongoing studies to evaluate this relationship in humans.  So far, there are studies that support the safety of these medications.  One showed that the rates of cancer in patient using these medications topically were less than the rates of the general population and another showed that in  "patient's using the medication over a large area of the body, the levels of the medication in the blood was undetectable.    As for Eucrisa, this product is only approved for the topical treatment of mild-to-moderate eczema in patients 2 years of age and older; use of the medication in kids younger than 2 is considered “off label” and has not been formally studied.  Burning and stinging are the most commonly reported side effects of this medication.  Rarely, this product has been known to cause hives and hypersensitivity reactions; discontinue its use if you develop severe itching, swelling, or redness in the area of application.     MELANOCYTIC NEVI (\"Moles\")    Physical Exam:  Anatomic Location Affected:   Mostly on sun-exposed areas of the trunk and extremities  Morphological Description:  Scattered, 1-4mm round to ovoid, symmetrical-appearing, even bordered, skin colored to dark brown macules/papules, mostly in sun-exposed areas  Pertinent Positives:  Pertinent Negatives:    Additional History of Present Condition:      Assessment and Plan:  Based on a thorough discussion of this condition and the management approach to it (including a comprehensive discussion of the known risks, side effects and potential benefits of treatment), the patient (family) agrees to implement the following specific plan:  When outside we recommend using a wide brim hat, sunglasses, long sleeve and pants, sunscreen with SPF 30+ with reapplication every 2 hours, or SPF specific clothing   Benign, reassured  Annual skin check     Melanocytic Nevi  Melanocytic nevi (\"moles\") are tan or brown, raised or flat areas of the skin which have an increased number of melanocytes. Melanocytes are the cells in our body which make pigment and account for skin color.    Some moles are present at birth (I.e., \"congenital nevi\"), while others come up later in life (i.e., \"acquired nevi\").  The sun can stimulate the body to make more moles.  Sunburns are " "not the only thing that triggers more moles.  Chronic sun exposure can do it too.     Clinically distinguishing a healthy mole from melanoma may be difficult, even for experienced dermatologists. The \"ABCDE's\" of moles have been suggested as a means of helping to alert a person to a suspicious mole and the possible increased risk of melanoma.  The suggestions for raising alert are as follows:    Asymmetry: Healthy moles tend to be symmetric, while melanomas are often asymmetric.  Asymmetry means if you draw a line through the mole, the two halves do not match in color, size, shape, or surface texture. Asymmetry can be a result of rapid enlargement of a mole, the development of a raised area on a previously flat lesion, scaling, ulceration, bleeding or scabbing within the mole.  Any mole that starts to demonstrate \"asymmetry\" should be examined promptly by a board certified dermatologist.     Border: Healthy moles tend to have discrete, even borders.  The border of a melanoma often blends into the normal skin and does not sharply delineate the mole from normal skin.  Any mole that starts to demonstrate \"uneven borders\" should be examined promptly by a board certified dermatologist.     Color: Healthy moles tend to be one color throughout.  Melanomas tend to be made up of different colors ranging from dark black, blue, white, or red.  Any mole that demonstrates a color change should be examined promptly by a board certified dermatologist.     Diameter: Healthy moles tend to be smaller than 0.6 cm in size; an exception are \"congenital nevi\" that can be larger.  Melanomas tend to grow and can often be greater than 0.6 cm (1/4 of an inch, or the size of a pencil eraser). This is only a guideline, and many normal moles may be larger than 0.6 cm without being unhealthy.  Any mole that starts to change in size (small to bigger or bigger to smaller) should be examined promptly by a board certified dermatologist. " "    Evolving: Healthy moles tend to \"stay the same.\"  Melanomas may often show signs of change or evolution such as a change in size, shape, color, or elevation.  Any mole that starts to itch, bleed, crust, burn, hurt, or ulcerate or demonstrate a change or evolution should be examined promptly by a board certified dermatologist.        LENTIGO    Physical Exam:  Anatomic Location Affected:  trunk, arms  Morphological Description:  Light brown macules  Pertinent Positives:  Pertinent Negatives:    Additional History of Present Condition:      Assessment and Plan:  Based on a thorough discussion of this condition and the management approach to it (including a comprehensive discussion of the known risks, side effects and potential benefits of treatment), the patient (family) agrees to implement the following specific plan:  When outside we recommend using a wide brim hat, sunglasses, long sleeve and pants, sunscreen with SPF 30+ with reapplication every 2 hours, or SPF specific clothing       What is a lentigo?  A lentigo is a pigmented flat or slightly raised lesion with a clearly defined edge. Unlike an ephelis (freckle), it does not fade in the winter months. There are several kinds of lentigo.  The name lentigo originally referred to its appearance resembling a small lentil. The plural of lentigo is lentigines, although “lentigos” is also in common use.    Who gets lentigines?  Lentigines can affect males and females of all ages and races. Solar lentigines are especially prevalent in fair skinned adults. Lentigines associated with syndromes are present at birth or arise during childhood.    What causes lentigines?  Common forms of lentigo are due to exposure to ultraviolet radiation:  Sun damage including sunburn   Indoor tanning   Phototherapy, especially photochemotherapy (PUVA)    Ionizing radiation, eg radiation therapy, can also cause lentigines.  Several familial syndromes associated with widespread " "lentigines originate from mutations in Stevie-MAP kinase, mTOR signaling and PTEN pathways.    What is the treatment for lentigines?  Most lentigines are left alone. Attempts to lighten them may not be successful. The following approaches are used:  SPF 50+ broad-spectrum sunscreen   Hydroquinone bleaching cream   Alpha hydroxy acids   Vitamin C   Retinoids   Azelaic acid   Chemical peels  Individual lesions can be permanently removed using:  Cryotherapy   Intense pulsed light   Pigment lasers    How can lentigines be prevented?  Lentigines associated with exposure ultraviolet radiation can be prevented by very careful sun protection. Clothing is more successful at preventing new lentigines than are sunscreens.    What is the outlook for lentigines?  Lentigines usually persist. They may increase in number with age and sun exposure. Some in sun-protected sites may fade and disappear.    KIM ANGIOMAS    Physical Exam:  Anatomic Location Affected:  trunk  Morphological Description:  Scattered cherry red, 1-4 mm papules.  Pertinent Positives:  Pertinent Negatives:    Additional History of Present Condition:      Assessment and Plan:  Based on a thorough discussion of this condition and the management approach to it (including a comprehensive discussion of the known risks, side effects and potential benefits of treatment), the patient (family) agrees to implement the following specific plan:  Monitor for changes  Benign, reassured      Assessment and Plan:    Cherry angioma, also known as Briseno de Jesús spots, are benign vascular skin lesions. A \"cherry angioma\" is a firm red, blue or purple papule, 0.1-1 cm in diameter. When thrombosed, they can appear black in colour until evaluated with a dermatoscope when the red or purple colour is more easily seen. Cherry angioma may develop on any part of the body but most often appear on the scalp, face, lips and trunk.  An angioma is due to proliferating endothelial cells; " "these are the cells that line the inside of a blood vessel.    Angiomas can arise in early life or later in life; the most common type of angioma is a cherry angioma.  Cherry angiomas are very common in males and females of any age or race. They are more noticeable in white skin than in skin of colour. They markedly increase in number from about the age of 40. There may be a family history of similar lesions. Eruptive cherry angiomas have been rarely reported to be associated with internal malignancy. The cause of angiomas is unknown. Genetic analysis of cherry angiomas has shown that they frequently carry specific somatic missense mutations in the GNAQ and GNA11 (Q209H) genes, which are involved in other vascular and melanocytic proliferations.      SEBORRHEIC KERATOSIS; NON-INFLAMED    Physical Exam:  Anatomic Location Affected:  trunk  Morphological Description:  Flat and raised, waxy, smooth to warty textured, yellow to brownish-grey to dark brown to blackish, discrete, \"stuck-on\" appearing papules.  Pertinent Positives:  Pertinent Negatives:    Additional History of Present Condition:      Assessment and Plan:  Based on a thorough discussion of this condition and the management approach to it (including a comprehensive discussion of the known risks, side effects and potential benefits of treatment), the patient (family) agrees to implement the following specific plan:  Monitor for changes  Benign, reassured      Seborrheic Keratosis  A seborrheic keratosis is a harmless warty spot that appears during adult life as a common sign of skin aging.  Seborrheic keratoses can arise on any area of skin, covered or uncovered, with the usual exception of the palms and soles. They do not arise from mucous membranes. Seborrheic keratoses can have highly variable appearance.      Seborrheic keratoses are extremely common. It has been estimated that over 90% of adults over the age of 60 years have one or more of them. They " "occur in males and females of all races, typically beginning to erupt in the 30s or 40s. They are uncommon under the age of 20 years.  The precise cause of seborrhoeic keratoses is not known.  Seborrhoeic keratoses are considered degenerative in nature. As time goes by, seborrheic keratoses tend to become more numerous. Some people inherit a tendency to develop a very large number of them; some people may have hundreds of them.      There is no easy way to remove multiple lesions on a single occasion.  Unless a specific lesion is \"inflamed\" and is causing pain or stinging/burning or is bleeding, most insurance companies do not authorize treatment.    XEROSIS (\"DRY SKIN\")    Physical Exam:  Anatomic Location Affected:  diffuse  Morphological Description:  xerosis  Pertinent Positives:  Pertinent Negatives:    Additional History of Present Condition:      Assessment and Plan:  Based on a thorough discussion of this condition and the management approach to it (including a comprehensive discussion of the known risks, side effects and potential benefits of treatment), the patient (family) agrees to implement the following specific plan:  Use moisturizer like Eucerin,Cerave or Aveeno Cream 3 times a day for the dry skin            Dry skin refers to skin that feels dry to touch. Dry skin has a dull surface with a rough, scaly quality. The skin is less pliable and cracked. When dryness is severe, the skin may become inflamed and fissured.  Although any body site can be dry, dry skin tends to affect the shins more than any other site.    Dry skin is lacking moisture in the outer horny cell layer (stratum corneum) and this results in cracks in the skin surface.  Dry skin is also called xerosis, xeroderma or asteatosis (lack of fat).  It can affect males and females of all ages. There is some racial variability in water and lipid content of the skin.  Dry skin that starts in early childhood may be one of about 20 types of " ichthyosis (fish-scale skin). There is often a family history of dry skin.   Dry skin is commonly seen in people with atopic dermatitis.  Nearly everyone > 60 years has dry skin.    Dry skin that begins later may be seen in people with certain diseases and conditions.  Postmenopausal women  Hypothyroidism  Chronic renal disease   Malnutrition and weight loss   Subclinical dermatitis   Treatment with certain drugs such as oral retinoids, diuretics and epidermal growth factor receptor inhibitors      What is the treatment for dry skin?  The mainstay of treatment of dry skin and ichthyosis is moisturisers/emollients. They should be applied liberally and often enough to:  Reduce itch   Improve the barrier function   Prevent entry of irritants, bacteria   Reduce transepidermal water loss.      How can dry skin be prevented?  Eliminate aggravating factors:  Reduce the frequency of bathing.   A humidifier in winter and air conditioner in summer   Compare having a short shower with a prolonged soak in a bath.   Use lukewarm, not hot, water.   Replace standard soap with a substitute such as a synthetic detergent cleanser, water-miscible emollient, bath oil, anti-pruritic tar oil, colloidal oatmeal etc.   Apply an emollient liberally and often, particularly shortly after bathing, and when itchy. The drier the skin, the thicker this should be, especially on the hands.    What is the outlook for dry skin?  A tendency to dry skin may persist life-long, or it may improve once contributing factors are controlled.     Scribe Attestation      I,:  Vanessa Garay MA am acting as a scribe while in the presence of the attending physician.:       I,:  Maria D Mansfield MD personally performed the services described in this documentation    as scribed in my presence.:                 [1]   Allergies  Allergen Reactions    Cefixime Other (See Comments)    Ciprofloxacin GI Intolerance    Clarithromycin GI Intolerance    Levofloxacin GI  Intolerance    Oxycodone GI Intolerance    Medical Tape Rash    Morphine Rash   [2]   Current Outpatient Medications:     alfuzosin (UROXATRAL) 10 mg 24 hr tablet, Take 10 mg by mouth daily, Disp: , Rfl:     Ascorbic Acid, Vitamin C, (VITAMIN C) 100 MG tablet, Vitamin C, Disp: , Rfl:     clobetasol (TEMOVATE) 0.05 % cream, Apply topically 2 (two) times a day For two weeks to affected areas. Avoid face, groin, axilla, Disp: 45 g, Rfl: 1    cycloSPORINE (Restasis) 0.05 % ophthalmic emulsion, Restasis 0.05 % eye drops in a dropperette  INSTILL 1 DROP INTO BOTH EYES TWICE A DAY, Disp: , Rfl:     Dupilumab (Dupixent) 300 MG/2ML SOPN, Inject 300mg (1 pen) every other week for maintenance dose, Disp: 4 mL, Rfl: 11    Dupixent 300 MG/2ML SOAJ, INJECT 1 PEN SUBCUTANEOUSLY EVERY OTHER WEEK, Disp: 4 mL, Rfl: 0    fluticasone (FLONASE) 50 mcg/act nasal spray, 2 sprays into each nostril daily, Disp: , Rfl:     hydrOXYzine HCL (ATARAX) 25 mg tablet, Take 1 tablet (25 mg total) by mouth daily at bedtime (Patient not taking: Reported on 3/14/2024), Disp: 30 tablet, Rfl: 0    ibuprofen (MOTRIN) 600 mg tablet, Take 600 mg by mouth every 8 (eight) hours, Disp: , Rfl:     ketoconazole (NIZORAL) 2 % shampoo, ketoconazole 2 % shampoo  SHAMPOO HAIR  2-3 TIMES A WEEK (Patient not taking: Reported on 11/29/2023), Disp: , Rfl:     Multiple Vitamin (MULTIVITAMIN ADULT PO), multivitamin, Disp: , Rfl:     mupirocin (BACTROBAN) 2 % ointment, mupirocin 2 % topical ointment  APPLY INTRANASALLY TWICE A DAY  as directed (Patient not taking: Reported on 10/4/2023), Disp: , Rfl:     ondansetron (ZOFRAN) 4 mg tablet, take 1 tablet by mouth every 8 hours as needed for nausea and vomiting, Disp: , Rfl:     oxyCODONE-acetaminophen (PERCOCET) 5-325 mg per tablet, TAKE 1 TABLET BY MOUTH EVERY 6 HRS AS NEEDED FOR SEVERE PAIN (SCORE 7-10). MAX 4 TABLETS/DAY, Disp: , Rfl:     rosuvastatin (CRESTOR) 5 mg tablet, Take 5 mg by mouth daily, Disp: , Rfl:      solifenacin (VESICARE) 10 MG tablet, Take 10 mg by mouth daily, Disp: , Rfl:     tamsulosin (FLOMAX) 0.4 mg, TAKE 1 CAPSULE BY MOUTH EVERY DAY AT NIGHT, Disp: , Rfl:

## 2025-06-19 NOTE — TELEPHONE ENCOUNTER
PA for Dupixent 300mg pen  APPROVED     Date(s) approved until 06/16/2026    Case #[id number]    Patient advised by          []MyChart Message  []Phone call   [x]LMOM  []L/M to call office as no active Communication consent on file  []Unable to leave detailed message as VM not approved on Communication consent       Pharmacy advised by    [x]Fax  []Phone call  []Secure Chat    Specialty Pharmacy    [x][facility]      Approval letter scanned into Media No Screen shot provided